# Patient Record
Sex: FEMALE | Race: WHITE | NOT HISPANIC OR LATINO | Employment: OTHER | URBAN - METROPOLITAN AREA
[De-identification: names, ages, dates, MRNs, and addresses within clinical notes are randomized per-mention and may not be internally consistent; named-entity substitution may affect disease eponyms.]

---

## 2017-03-01 DIAGNOSIS — M81.0 OSTEOPOROSIS: ICD-10-CM

## 2017-03-01 RX ORDER — ALENDRONATE SODIUM 70 MG/1
TABLET ORAL
Qty: 4 TABLET | Refills: 0 | OUTPATIENT
Start: 2017-03-01

## 2017-03-14 ENCOUNTER — CLINICAL SUPPORT NO REQUIREMENTS (OUTPATIENT)
Dept: CARDIOLOGY | Facility: CLINIC | Age: 82
End: 2017-03-14

## 2017-03-14 DIAGNOSIS — I48.20 CHRONIC ATRIAL FIBRILLATION (HCC): Primary | ICD-10-CM

## 2017-03-14 PROCEDURE — 93279 PRGRMG DEV EVAL PM/LDLS PM: CPT | Performed by: INTERNAL MEDICINE

## 2017-03-15 ENCOUNTER — OFFICE VISIT (OUTPATIENT)
Dept: FAMILY MEDICINE CLINIC | Facility: CLINIC | Age: 82
End: 2017-03-15

## 2017-03-15 VITALS
HEIGHT: 66 IN | WEIGHT: 151 LBS | HEART RATE: 72 BPM | RESPIRATION RATE: 16 BRPM | TEMPERATURE: 97.7 F | DIASTOLIC BLOOD PRESSURE: 88 MMHG | BODY MASS INDEX: 24.27 KG/M2 | SYSTOLIC BLOOD PRESSURE: 145 MMHG

## 2017-03-15 DIAGNOSIS — E78.49 OTHER HYPERLIPIDEMIA: ICD-10-CM

## 2017-03-15 DIAGNOSIS — M10.9 GOUT, UNSPECIFIED CAUSE, UNSPECIFIED CHRONICITY, UNSPECIFIED SITE: ICD-10-CM

## 2017-03-15 DIAGNOSIS — I10 ESSENTIAL HYPERTENSION: ICD-10-CM

## 2017-03-15 DIAGNOSIS — M81.0 OSTEOPOROSIS: ICD-10-CM

## 2017-03-15 DIAGNOSIS — E11.9 TYPE 2 DIABETES MELLITUS WITHOUT COMPLICATION, WITHOUT LONG-TERM CURRENT USE OF INSULIN (HCC): Primary | ICD-10-CM

## 2017-03-15 PROCEDURE — 99214 OFFICE O/P EST MOD 30 MIN: CPT | Performed by: FAMILY MEDICINE

## 2017-03-15 RX ORDER — VALSARTAN 160 MG/1
160 TABLET ORAL DAILY
Qty: 30 TABLET | Refills: 5 | Status: SHIPPED | OUTPATIENT
Start: 2017-03-15 | End: 2017-09-10 | Stop reason: SDUPTHER

## 2017-03-15 RX ORDER — FEBUXOSTAT 40 MG/1
40 TABLET, FILM COATED ORAL DAILY
Qty: 30 TABLET | Refills: 5 | Status: SHIPPED | OUTPATIENT
Start: 2017-03-15 | End: 2017-09-27

## 2017-03-15 RX ORDER — ALENDRONATE SODIUM 70 MG/1
70 TABLET ORAL
Qty: 4 TABLET | Refills: 5 | Status: SHIPPED | OUTPATIENT
Start: 2017-03-15 | End: 2017-09-27 | Stop reason: SDUPTHER

## 2017-03-15 NOTE — PATIENT INSTRUCTIONS
"Stop enalapril, start valsartan. Start januvia.       Diabetes   Diabetes and Nutrition      Why does it matter what I eat?    What you eat is closely connected to the amount of sugar in your blood. The right food choices will help you control your blood sugar level.    Do I have to follow a special diet?    There isn't one specific \"diabetes diet.\" Your doctor will probably suggest that you work with a registered dietitian to design a meal plan. A meal plan is a guide that tells you what kinds of food to eat at meals and for snacks. The plan also tells you how much food to have. For most people who have diabetes (and those without, too), a healthy diet consists of 40% to 60% of calories from carbohydrates, 20% from protein and 30% or less from fat. It should be low in cholesterol, low in salt and low in added sugar.    Can I eat any sugar?    Yes. In recent years, doctors have learned that eating some sugar doesn't usually cause problems for most people who have diabetes--as long as it is part of a balanced diet. Just be careful about how much sugar you eat and try not to add sugar to foods.    What kinds of foods can I eat?    In general, at each meal you may have 2 to 5 choices (or up to 60 grams) of carbohydrates, 1 choice of protein and a certain amount of fat. Talk to your doctor or dietitian for specific advice.    Carbohydrates. Carbohydrates are found in fruits, vegetables, beans, dairy foods and starchy foods such as breads. Try to have fresh fruits rather than canned fruits, fruit juices or dried fruit. You may eat fresh vegetables and frozen or canned vegetables. Condiments such as nonfat mayonnaise, ketchup and mustard are also carbohydrates.    Protein. Protein is found in meat, poultry, fish, dairy products, beans and some vegetables. Try to eat poultry and fish more often than red meat. Don't eat poultry skin, and trim extra fat from all meat. Choose nonfat or reduced-fat options when you eat dairy, " such as cheeses and yogurts.    Fat. Butter, margarine, lard and oils add fat to food. Fat is also in many dairy and meat products. Try to avoid fried foods, mayonnaise-based dishes (unless they are made with fat-free thomson), egg yolks, brown and high-fat dairy products. Your doctor or dietitian will tell you how many grams of fat you may eat each day. When eating fat-free versions of foods (such as mayonnaise and butter), check the label to see how many grams of carbohydrates they contain. Keep in mind that these products often have added sugar.       This handout was developed by the [employer] in cooperation with the American Diabetes Association.             Diabetes type 2 - meal planning      When you have type 2 diabetes, taking time to plan your meals goes a long way toward controlling your blood sugar and weight.    Your main focus is on keeping your blood sugar (glucose) level in your target range. To help manage your blood sugar, follow a meal plan that has:  •Food from all the food groups  •Fewer calories  •About the same amount of carbohydrates at each meal and snack  •Healthy fats    Along with healthy eating, you can keep your blood sugar in target range by maintaining a healthy weight. Persons with type 2 diabetes are often overweight. Losing just 10 pounds can help you manage your diabetes better. Eating healthy foods and staying active (for example, 30 minutes of walking per day) can help you meet and maintain your weight loss goal.     HOW CARBOHYDRATES AFFECT BLOOD SUGAR    Carbohydrates in food give your body energy. You need to eat carbohydrates to maintain your energy. But carbohydrates also raise your blood sugar higher and faster than other kinds of food.    The main kinds of carbohydrates are starches, sugars, and fiber. Learn which foods have carbohydrates. This will help with meal planning so that you can keep your blood sugar in your target range.    MEAL  PLANNING FOR CHILDREN WITH TYPE 2 DIABETES    Meal plans should consider the amount of calories children need to grow. In general, three small meals and three snacks a day can help meet calorie needs. Many children with type 2 diabetes are overweight. The goal should be a healthy weight by eating healthy foods and getting more activity (60 minutes each day).    Work with a registered dietitian to design a meal plan for your child. A registered dietitian is an expert in food and nutrition.    The following tips can help your child stay on track:  •No food is off-limits. Knowing how different foods affect your child’s blood sugar helps you and your child keep it in target range.  •Help your child learn how much food is a healthy amount. This is called portion control.  •Have your family gradually switch from drinking soda and other sugary drinks, such as sports drinks and juices, to plain water or low-fat milk.            PLANNING MEALS    Everyone has individual needs. Work with your doctor, registered dietitian, or diabetes educator to develop a meal plan that works for you.    When shopping, read food labels to make better food choices.    A good way to make sure you get all the nutrients you need during meals is to use the plate method. This is a visual food guide that helps you choose the best types and right amounts of food to eat. It encourages larger portions of non-starchy vegetables (half the plate) and moderate portions of protein (one quarter of the plate) and starch (one quarter of the plate). You can find more information about the plate method at the American Diabetes Association website: http://www.diabetes.org/food-and-fitness/food/planning-meals/create-your-plate.    EAT A VARIETY OF FOODS    Eating a wide variety of foods helps you stay healthy. Try to include foods from all the food groups at each meal.     VEGETABLES (2½ to 3 cups a day)    Choose fresh or frozen vegetables without added sauces,  fats, or salt. Non-starchy vegetables include dark green and deep yellow vegetables, such as spinach, broccoli, saba lettuce, cabbage, chard, and bell peppers. Starchy vegetables include corn, green peas, lima beans, potatoes, and taro.    FRUITS (1½ to 2 cups a day)    Choose fresh, frozen, canned (without added sugar), or dried fruits. Try apples, bananas, berries, cherries, fruit cocktail, grapes, melon, oranges, peaches, pears, papaya, pineapple, raisins. Drink juices that are 100% fruit with no added sweeteners or syrups.    GRAINS (3 to 4 ounces a day)    There are two types of grains:  •Whole grains are unprocessed and have the entire grain kernel. Examples are whole-wheat flour, oatmeal, whole cornmeal, amaranth, barley, brown and wild rice, buckwheat, and quinoa.  •Refined grains have been processed (milled) to remove the bran and germ. Examples are white flour, de-germed cornmeal, white bread, and white rice.    Grains have starch, a type of carbohydrate. Carbohydrates raise your blood sugar level. So, for healthy eating, make sure half of the grains you eat each day are whole grains. Whole grains have lots of fiber. Fiber in the diet keeps your blood sugar level from rising too fast.           PROTEIN FOODS (5 to 6½ ounces a day)    Protein foods include meat, poultry, seafood, eggs, beans and peas, nuts, seeds, and processed soy foods. Eat fish and poultry more often. Remove the skin from chicken and turkey. Select lean cuts of beef, veal, pork, or wild game. Trim all visible fat from meat. Bake, roast, broil, grill, or boil instead of frying.    DAIRY (3 cups a day)    Choose low-fat or nonfat dairy products. Be aware that milk, yogurt, and other dairy foods have natural sugar even when they do not contain added sugar. Take this into account when planning meals to stay in your blood sugar target range.        OILS/FATS (no more than 7 teaspoons a day)    Oils are not considered a food group. But they  have nutrients that help your body stay healthy. Oils are different from fats in that oils remain liquid at room temperature. Fats remain solid at room temperature.    Limit your intake of fatty foods, especially those high in saturated fat, such as hamburgers, deep-fried foods, brown, and butter.     Instead, choose foods that are high in polyunsaturated or monounsaturated fats. These include fish, nuts, and vegetable oils.    Oils can raise your blood sugar, but not as fast as starch. Oils are also high in calories. Try to use no more than the recommended daily limit of 7 teaspoons.    WHAT ABOUT ALCOHOL AND SWEETS?    If you choose to drink alcohol, limit the amount and have it with a meal. Check with your health care provider about how alcohol will affect your blood sugar and to determine a safe amount for you.    Sweets are high in fat and sugar. Keep portion sizes small.     Here are tips to help avoid eating too many sweets:  •Ask for extra spoons and forks and split your dessert with others.  •Eat sweets that are sugar-free.  •Always ask for the smallest serving size or children’s size.      A registered dietitian can help you decide how to balance the carbohydrates, protein, and fat in your diet. Here are some general guidelines:    The amount of each type of food you eat depends on:  •Your diet  •Your weight  •How often you exercise  •Your other health risks    Everyone has individual needs. Work with your doctor, and possibly a dietitian, to develop a meal plan that works for you.    The Diabetes Food Pyramid, which resembles the old USDA food guide pyramid, splits foods into six groups in a range of serving sizes. In the Diabetes Food Pyramid, food groups are based on carbohydrate and protein content instead of their food type. A person with diabetes should eat more of the foods in the bottom of the pyramid (grains, beans, vegetables) than those on the top (fats and sweets). This diet will help keep your  "heart and body systems healthy.    Another method, similar to the new \"plate\" USDA food guide, encourages larger portions of vegetables (half the plate) and moderate portions of protein (one-quarter of the plate) and starch (one-quarter of the plate).    GRAINS, BEANS, AND STARCHY VEGETABLES    (6 or more servings a day)    Foods like bread, grains, beans, rice, pasta, and starchy vegetables are at the bottom of the pyramid because they should serve as the foundation of your diet. As a group, these foods are loaded with vitamins, minerals, fiber, and healthy carbohydrates.    It is important, however, to eat foods with plenty of fiber. Choose whole-grain foods such as whole-grain bread or crackers, tortillas, bran cereal, brown rice, or beans. Use whole-wheat or other whole-grain flours in cooking and baking. Choose low-fat breads, such as bagels, tortillas, English muffins, and abdirizak bread.    VEGETABLES    (3 - 5 servings a day)    Choose fresh or frozen vegetables without added sauces, fats, or salt. Opt for more dark green and deep yellow vegetables, such as spinach, broccoli, saba lettuce, carrots, and peppers.    FRUITS    (2 - 4 servings a day)    Choose whole fruits more often than juices. Whole fruits have more fiber. Citrus fruits, such as oranges, grapefruits, and tangerines, are best. Drink fruit juices that do NOT have added sweeteners or syrups.    MILK    (2 - 3 servings a day)    Choose low-fat or nonfat milk or yogurt. Yogurt has natural sugar in it, but it can also contain added sugar or artificial sweeteners. Yogurt with artificial sweeteners has fewer calories than yogurt with added sugar.    MEAT AND FISH    (2 - 3 servings a day)    Eat fish and poultry more often. Remove the skin from chicken and turkey. Select lean cuts of beef, veal, pork, or wild game. Trim all visible fat from meat. Bake, roast, broil, grill, or boil instead of frying.    FATS, ALCOHOL, AND SWEETS    In general, you " should limit your intake of fatty foods, especially those high in saturated fat, such as hamburgers, cheese, brown, and butter.    If you choose to drink alcohol, limit the amount and have it with a meal. Check with your health care provider about how alcohol will affect your blood sugar, and to determine a safe amount for you.    Sweets are high in fat and sugar, so keep portion sizes small. Here are some tips to help avoid eating too many sweets:  •Ask for extra spoons and forks and split your dessert with others.  •Eat sweets that are sugar-free.  •Always ask for the small serving size.    Learn how to read food labels, and consult them when making food decisions.      References      American Diabetes Association. Standards of medical care in diabetes -- 2013.American Diabetes Association. Standards of medical care in diabetes -- 2013. Diabetes Care. 2013;36 Suppl 1:S11-S66

## 2017-03-15 NOTE — PROGRESS NOTES
"Chief Complaint   Patient presents with   • Diabetes       Subjective   This patient presents the office for medicine refill.  She is here also to review labs.  Her gout is still present but improved.  She still has significant hand swelling.  Please see photo below.  She continues to take medicines for osteoporosis.  Refills are needed.  Blood pressure is not to goal and we will change from her ACE inhibitor to valsartan.  Short-term follow-up in 6 weeks to recheck blood pressure. Diabetes has shown mild progression.  We will add Januvia to her current regimen.  She will pay attention to her diabetic diet.  Review of Systems   Constitutional: Negative for fatigue.   Cardiovascular: Negative for chest pain.   Musculoskeletal: Positive for arthralgias.       Objective   Visit Vitals   • /88   • Pulse 72   • Temp 97.7 °F (36.5 °C) (Oral)   • Resp 16   • Ht 66\" (167.6 cm)   • Wt 151 lb (68.5 kg)   • BMI 24.37 kg/m2     Body mass index is 24.37 kg/(m^2).  Physical Exam   Constitutional: She is cooperative. No distress.   Eyes: Conjunctivae and lids are normal.   Neck: Carotid bruit is not present. No tracheal deviation present.   Cardiovascular: Normal rate, regular rhythm and normal heart sounds.    No murmur heard.  Pulmonary/Chest: Effort normal and breath sounds normal.   Neurological: She is alert. She is not disoriented.   Skin: Skin is warm and dry.   Psychiatric: She has a normal mood and affect. Her speech is normal and behavior is normal.   Vitals reviewed.          Assessment/Plan     Problem List Items Addressed This Visit        Cardiovascular and Mediastinum    Hypertension    Relevant Medications    valsartan (DIOVAN) 160 MG tablet    Hyperlipidemia       Endocrine    Type 2 diabetes mellitus - Primary    Relevant Medications    valsartan (DIOVAN) 160 MG tablet    metFORMIN (GLUCOPHAGE) 500 MG tablet    SITagliptin (JANUVIA) 50 MG tablet       Musculoskeletal and Integument    Osteoporosis    " Relevant Medications    alendronate (FOSAMAX) 70 MG tablet       Other    Gout    Relevant Medications    febuxostat (ULORIC) 40 MG tablet          Outpatient Encounter Prescriptions as of 3/15/2017   Medication Sig Dispense Refill   • aspirin 81 MG tablet Take by mouth daily.     • atorvastatin (LIPITOR) 20 MG tablet Take 1 tablet by mouth Daily. 30 tablet 11   • digoxin (LANOXIN) 125 MCG tablet Take 1 tablet by mouth Daily. 30 tablet 11   • furosemide (LASIX) 40 MG tablet Take 1.5 tablets by mouth Daily. 45 tablet 11   • hydrALAZINE (APRESOLINE) 25 MG tablet Take 1 tablet by mouth 3 (Three) Times a Day. 90 tablet 11   • metoprolol tartrate (LOPRESSOR) 50 MG tablet Take 1 tablet by mouth 2 (Two) Times a Day. 60 tablet 11   • potassium chloride (KLOR-CON) 20 MEQ CR tablet Take 2 tablets by mouth Daily. 60 tablet 11   • [DISCONTINUED] colchicine (COLCRYS) 0.6 MG tablet Take 1.2 mg PO x 1, then 0.6 mg PO 1 hour later x 1 30 tablet 0   • [DISCONTINUED] enalapril (VASOTEC) 10 MG tablet Take 1 tablet by mouth Daily. 30 tablet 11   • alendronate (FOSAMAX) 70 MG tablet Take 1 tablet by mouth Every 7 (Seven) Days for 180 days. 4 tablet 5   • febuxostat (ULORIC) 40 MG tablet Take 1 tablet by mouth Daily for 180 days. 30 tablet 5   • metFORMIN (GLUCOPHAGE) 500 MG tablet Take 1 tablet by mouth 2 (Two) Times a Day With Meals for 180 days. 60 tablet 5   • SITagliptin (JANUVIA) 50 MG tablet Take 1 tablet by mouth Daily for 180 days. 30 tablet 5   • valsartan (DIOVAN) 160 MG tablet Take 1 tablet by mouth Daily for 180 days. 30 tablet 5   • [DISCONTINUED] alendronate (FOSAMAX) 70 MG tablet Take 1 tablet by mouth every 7 days for 180 days. 4 tablet 5   • [DISCONTINUED] febuxostat (ULORIC) 40 MG tablet Take 1 tablet by mouth Daily. 30 tablet 0   • [DISCONTINUED] metFORMIN (GLUCOPHAGE) 500 MG tablet Take 1 tablet by mouth 2 (two) times a day with meals for 180 days. 60 tablet 5     No facility-administered encounter medications on  file as of 3/15/2017.        No orders of the defined types were placed in this encounter.      Continue with current treatment plan.         F/U in 6 weeks to check on 2 medications

## 2017-04-26 ENCOUNTER — OFFICE VISIT (OUTPATIENT)
Dept: FAMILY MEDICINE CLINIC | Facility: CLINIC | Age: 82
End: 2017-04-26

## 2017-04-26 VITALS
DIASTOLIC BLOOD PRESSURE: 80 MMHG | HEART RATE: 68 BPM | RESPIRATION RATE: 16 BRPM | TEMPERATURE: 97.8 F | HEIGHT: 66 IN | WEIGHT: 153 LBS | BODY MASS INDEX: 24.59 KG/M2 | SYSTOLIC BLOOD PRESSURE: 138 MMHG

## 2017-04-26 DIAGNOSIS — E78.49 OTHER HYPERLIPIDEMIA: ICD-10-CM

## 2017-04-26 DIAGNOSIS — M10.9 GOUT, UNSPECIFIED CAUSE, UNSPECIFIED CHRONICITY, UNSPECIFIED SITE: ICD-10-CM

## 2017-04-26 DIAGNOSIS — E11.9 TYPE 2 DIABETES MELLITUS WITHOUT COMPLICATION, WITHOUT LONG-TERM CURRENT USE OF INSULIN (HCC): ICD-10-CM

## 2017-04-26 DIAGNOSIS — I10 ESSENTIAL HYPERTENSION: Primary | ICD-10-CM

## 2017-04-26 PROCEDURE — 99213 OFFICE O/P EST LOW 20 MIN: CPT | Performed by: FAMILY MEDICINE

## 2017-04-26 NOTE — PROGRESS NOTES
"Chief Complaint   Patient presents with   • Diabetes   • Hypertension       Subjective   Hypertension ROS: taking medications as instructed, no medication side effects noted, no dyspnea on exertion and no swelling of ankles.   New concerns: none.        Data Reviewed:  No visits with results within 30 Day(s) from this visit.  Latest known visit with results is:    Orders Only on 11/11/2016   Component Date Value   • Uric Acid 12/12/2016 5.6          Social History   Substance Use Topics   • Smoking status: Never Smoker   • Smokeless tobacco: Never Used   • Alcohol use No      Comment: occ       Review of Systems    Objective   /80  Pulse 68  Temp 97.8 °F (36.6 °C) (Oral)   Resp 16  Ht 66\" (167.6 cm)  Wt 153 lb (69.4 kg)  BMI 24.69 kg/m2  Body mass index is 24.69 kg/(m^2).  Physical Exam  Hypertension Exam: BP noted to be well controlled today in office, S1, S2 normal, no gallop, no murmur, chest clear, no JVD, no HSM, no edema.   Lab review: no lab studies available for review at time of visit.     Assessment/Plan   well controlled  Problem List Items Addressed This Visit        Cardiovascular and Mediastinum    Hypertension - Primary    Relevant Orders    Comprehensive metabolic panel    CBC and Differential    TSH    Hyperlipidemia    Relevant Orders    Lipid Panel With LDL/HDL Ratio       Endocrine    Type 2 diabetes mellitus    Relevant Orders    Comprehensive metabolic panel    CBC and Differential    Hemoglobin A1c    MicroAlbumin, Urine, Random       Other    Gout    Relevant Orders    Comprehensive metabolic panel    CBC and Differential    Uric Acid          Outpatient Encounter Prescriptions as of 4/26/2017   Medication Sig Dispense Refill   • alendronate (FOSAMAX) 70 MG tablet Take 1 tablet by mouth Every 7 (Seven) Days for 180 days. 4 tablet 5   • aspirin 81 MG tablet Take by mouth daily.     • atorvastatin (LIPITOR) 20 MG tablet Take 1 tablet by mouth Daily. 30 tablet 11   • digoxin " (LANOXIN) 125 MCG tablet Take 1 tablet by mouth Daily. 30 tablet 11   • febuxostat (ULORIC) 40 MG tablet Take 1 tablet by mouth Daily for 180 days. 30 tablet 5   • furosemide (LASIX) 40 MG tablet Take 1.5 tablets by mouth Daily. 45 tablet 11   • hydrALAZINE (APRESOLINE) 25 MG tablet Take 1 tablet by mouth 3 (Three) Times a Day. 90 tablet 11   • metFORMIN (GLUCOPHAGE) 500 MG tablet Take 1 tablet by mouth 2 (Two) Times a Day With Meals for 180 days. 60 tablet 5   • metoprolol tartrate (LOPRESSOR) 50 MG tablet Take 1 tablet by mouth 2 (Two) Times a Day. 60 tablet 11   • potassium chloride (KLOR-CON) 20 MEQ CR tablet Take 2 tablets by mouth Daily. 60 tablet 11   • SITagliptin (JANUVIA) 50 MG tablet Take 1 tablet by mouth Daily for 180 days. 30 tablet 5   • valsartan (DIOVAN) 160 MG tablet Take 1 tablet by mouth Daily for 180 days. 30 tablet 5     No facility-administered encounter medications on file as of 4/26/2017.        Orders Placed This Encounter   Procedures   • Comprehensive metabolic panel     Standing Status:   Future     Standing Expiration Date:   10/23/2017   • Lipid Panel With LDL/HDL Ratio     Standing Status:   Future     Standing Expiration Date:   10/23/2017   • TSH     Standing Status:   Future     Standing Expiration Date:   10/23/2017   • Hemoglobin A1c     Standing Status:   Future     Standing Expiration Date:   10/23/2017   • MicroAlbumin, Urine, Random     Standing Status:   Future     Standing Expiration Date:   10/23/2017   • Uric Acid     Standing Status:   Future     Standing Expiration Date:   10/23/2017       Continue with current treatment plan.  Current treatment plan is effective, no change in therapy.       F/U in 5 months

## 2017-05-02 ENCOUNTER — OFFICE VISIT (OUTPATIENT)
Dept: FAMILY MEDICINE CLINIC | Facility: CLINIC | Age: 82
End: 2017-05-02

## 2017-05-02 VITALS
OXYGEN SATURATION: 98 % | WEIGHT: 151 LBS | SYSTOLIC BLOOD PRESSURE: 136 MMHG | TEMPERATURE: 98.1 F | DIASTOLIC BLOOD PRESSURE: 90 MMHG | HEIGHT: 66 IN | RESPIRATION RATE: 16 BRPM | BODY MASS INDEX: 24.27 KG/M2 | HEART RATE: 70 BPM

## 2017-05-02 DIAGNOSIS — M10.9 GOUT, UNSPECIFIED CAUSE, UNSPECIFIED CHRONICITY, UNSPECIFIED SITE: Primary | ICD-10-CM

## 2017-05-02 PROCEDURE — 99213 OFFICE O/P EST LOW 20 MIN: CPT | Performed by: NURSE PRACTITIONER

## 2017-05-02 RX ORDER — METHYLPREDNISOLONE 4 MG/1
TABLET ORAL
Qty: 21 TABLET | Refills: 0 | Status: SHIPPED | OUTPATIENT
Start: 2017-05-02 | End: 2017-09-27

## 2017-06-25 ENCOUNTER — RESULTS ENCOUNTER (OUTPATIENT)
Dept: FAMILY MEDICINE CLINIC | Facility: CLINIC | Age: 82
End: 2017-06-25

## 2017-06-25 DIAGNOSIS — M10.9 GOUT, UNSPECIFIED CAUSE, UNSPECIFIED CHRONICITY, UNSPECIFIED SITE: ICD-10-CM

## 2017-06-25 DIAGNOSIS — E11.9 TYPE 2 DIABETES MELLITUS WITHOUT COMPLICATION, WITHOUT LONG-TERM CURRENT USE OF INSULIN (HCC): ICD-10-CM

## 2017-06-25 DIAGNOSIS — E78.49 OTHER HYPERLIPIDEMIA: ICD-10-CM

## 2017-06-25 DIAGNOSIS — I10 ESSENTIAL HYPERTENSION: ICD-10-CM

## 2017-09-10 DIAGNOSIS — E11.9 TYPE 2 DIABETES MELLITUS WITHOUT COMPLICATION, WITHOUT LONG-TERM CURRENT USE OF INSULIN (HCC): ICD-10-CM

## 2017-09-11 RX ORDER — VALSARTAN 160 MG/1
TABLET ORAL
Qty: 30 TABLET | Refills: 0 | Status: SHIPPED | OUTPATIENT
Start: 2017-09-11 | End: 2017-09-27 | Stop reason: SDUPTHER

## 2017-09-12 ENCOUNTER — CLINICAL SUPPORT NO REQUIREMENTS (OUTPATIENT)
Dept: CARDIOLOGY | Facility: CLINIC | Age: 82
End: 2017-09-12

## 2017-09-12 DIAGNOSIS — I48.20 CHRONIC ATRIAL FIBRILLATION (HCC): Primary | ICD-10-CM

## 2017-09-12 PROCEDURE — 93279 PRGRMG DEV EVAL PM/LDLS PM: CPT | Performed by: INTERNAL MEDICINE

## 2017-09-22 LAB
ALBUMIN SERPL-MCNC: 4.5 G/DL (ref 3.5–5.2)
ALBUMIN/GLOB SERPL: 1.9 G/DL
ALP SERPL-CCNC: 71 U/L (ref 39–117)
ALT SERPL-CCNC: 15 U/L (ref 1–33)
AST SERPL-CCNC: 18 U/L (ref 1–32)
BASOPHILS # BLD AUTO: 0.02 10*3/MM3 (ref 0–0.2)
BASOPHILS NFR BLD AUTO: 0.3 % (ref 0–1.5)
BILIRUB SERPL-MCNC: 1.2 MG/DL (ref 0.1–1.2)
BUN SERPL-MCNC: 17 MG/DL (ref 8–23)
BUN/CREAT SERPL: 18.7 (ref 7–25)
CALCIUM SERPL-MCNC: 9.1 MG/DL (ref 8.6–10.5)
CHLORIDE SERPL-SCNC: 101 MMOL/L (ref 98–107)
CHOLEST SERPL-MCNC: 132 MG/DL (ref 0–200)
CO2 SERPL-SCNC: 24.5 MMOL/L (ref 22–29)
CREAT SERPL-MCNC: 0.91 MG/DL (ref 0.57–1)
EOSINOPHIL # BLD AUTO: 0.2 10*3/MM3 (ref 0–0.7)
EOSINOPHIL NFR BLD AUTO: 3 % (ref 0.3–6.2)
ERYTHROCYTE [DISTWIDTH] IN BLOOD BY AUTOMATED COUNT: 16 % (ref 11.7–13)
GLOBULIN SER CALC-MCNC: 2.4 GM/DL
GLUCOSE SERPL-MCNC: 213 MG/DL (ref 65–99)
HBA1C MFR BLD: 8.23 % (ref 4.8–5.6)
HCT VFR BLD AUTO: 38.9 % (ref 35.6–45.5)
HDLC SERPL-MCNC: 46 MG/DL (ref 40–60)
HGB BLD-MCNC: 12.4 G/DL (ref 11.9–15.5)
IMM GRANULOCYTES # BLD: 0.02 10*3/MM3 (ref 0–0.03)
IMM GRANULOCYTES NFR BLD: 0.3 % (ref 0–0.5)
LDLC SERPL CALC-MCNC: 60 MG/DL (ref 0–100)
LDLC/HDLC SERPL: 1.3 {RATIO}
LYMPHOCYTES # BLD AUTO: 1.42 10*3/MM3 (ref 0.9–4.8)
LYMPHOCYTES NFR BLD AUTO: 21.1 % (ref 19.6–45.3)
MCH RBC QN AUTO: 32.5 PG (ref 26.9–32)
MCHC RBC AUTO-ENTMCNC: 31.9 G/DL (ref 32.4–36.3)
MCV RBC AUTO: 101.8 FL (ref 80.5–98.2)
MICROALBUMIN UR-MCNC: 19.4 UG/ML
MONOCYTES # BLD AUTO: 0.46 10*3/MM3 (ref 0.2–1.2)
MONOCYTES NFR BLD AUTO: 6.8 % (ref 5–12)
NEUTROPHILS # BLD AUTO: 4.61 10*3/MM3 (ref 1.9–8.1)
NEUTROPHILS NFR BLD AUTO: 68.5 % (ref 42.7–76)
PLATELET # BLD AUTO: 227 10*3/MM3 (ref 140–500)
POTASSIUM SERPL-SCNC: 4.4 MMOL/L (ref 3.5–5.2)
PROT SERPL-MCNC: 6.9 G/DL (ref 6–8.5)
RBC # BLD AUTO: 3.82 10*6/MM3 (ref 3.9–5.2)
SODIUM SERPL-SCNC: 141 MMOL/L (ref 136–145)
TRIGL SERPL-MCNC: 132 MG/DL (ref 0–150)
TSH SERPL DL<=0.005 MIU/L-ACNC: 4.08 MIU/ML (ref 0.27–4.2)
URATE SERPL-MCNC: 8.1 MG/DL (ref 2.4–5.7)
VLDLC SERPL CALC-MCNC: 26.4 MG/DL (ref 5–40)
WBC # BLD AUTO: 6.73 10*3/MM3 (ref 4.5–10.7)

## 2017-09-27 ENCOUNTER — OFFICE VISIT (OUTPATIENT)
Dept: FAMILY MEDICINE CLINIC | Facility: CLINIC | Age: 82
End: 2017-09-27

## 2017-09-27 VITALS
HEART RATE: 73 BPM | HEIGHT: 66 IN | WEIGHT: 149.5 LBS | DIASTOLIC BLOOD PRESSURE: 87 MMHG | SYSTOLIC BLOOD PRESSURE: 141 MMHG | BODY MASS INDEX: 24.03 KG/M2 | TEMPERATURE: 97.1 F

## 2017-09-27 DIAGNOSIS — Z23 IMMUNIZATION DUE: ICD-10-CM

## 2017-09-27 DIAGNOSIS — E11.65 UNCONTROLLED TYPE 2 DIABETES MELLITUS WITH HYPERGLYCEMIA, WITHOUT LONG-TERM CURRENT USE OF INSULIN (HCC): Primary | ICD-10-CM

## 2017-09-27 DIAGNOSIS — M10.9 GOUT, UNSPECIFIED CAUSE, UNSPECIFIED CHRONICITY, UNSPECIFIED SITE: ICD-10-CM

## 2017-09-27 DIAGNOSIS — M81.0 OSTEOPOROSIS: ICD-10-CM

## 2017-09-27 DIAGNOSIS — E78.49 OTHER HYPERLIPIDEMIA: ICD-10-CM

## 2017-09-27 DIAGNOSIS — E11.9 TYPE 2 DIABETES MELLITUS WITHOUT COMPLICATION, WITHOUT LONG-TERM CURRENT USE OF INSULIN (HCC): ICD-10-CM

## 2017-09-27 PROCEDURE — 90662 IIV NO PRSV INCREASED AG IM: CPT | Performed by: FAMILY MEDICINE

## 2017-09-27 PROCEDURE — G0008 ADMIN INFLUENZA VIRUS VAC: HCPCS | Performed by: FAMILY MEDICINE

## 2017-09-27 PROCEDURE — 99214 OFFICE O/P EST MOD 30 MIN: CPT | Performed by: FAMILY MEDICINE

## 2017-09-27 RX ORDER — VALSARTAN 320 MG/1
320 TABLET ORAL DAILY
Qty: 30 TABLET | Refills: 5 | Status: SHIPPED | OUTPATIENT
Start: 2017-09-27 | End: 2017-11-27 | Stop reason: SDUPTHER

## 2017-09-27 RX ORDER — ATORVASTATIN CALCIUM 20 MG/1
20 TABLET, FILM COATED ORAL DAILY
Qty: 30 TABLET | Refills: 5 | Status: SHIPPED | OUTPATIENT
Start: 2017-09-27 | End: 2017-11-27 | Stop reason: SDUPTHER

## 2017-09-27 RX ORDER — FEBUXOSTAT 40 MG/1
40 TABLET, FILM COATED ORAL DAILY
Qty: 30 TABLET | Refills: 5 | Status: SHIPPED | OUTPATIENT
Start: 2017-09-27 | End: 2017-11-27 | Stop reason: ALTCHOICE

## 2017-09-27 RX ORDER — PIOGLITAZONEHYDROCHLORIDE 15 MG/1
15 TABLET ORAL DAILY
Qty: 30 TABLET | Refills: 5 | Status: SHIPPED | OUTPATIENT
Start: 2017-09-27 | End: 2017-11-27 | Stop reason: SDUPTHER

## 2017-09-27 RX ORDER — ALENDRONATE SODIUM 70 MG/1
70 TABLET ORAL
Qty: 4 TABLET | Refills: 5 | Status: SHIPPED | OUTPATIENT
Start: 2017-09-27 | End: 2017-11-27 | Stop reason: SDUPTHER

## 2017-09-27 NOTE — PROGRESS NOTES
"Chief Complaint   Patient presents with   • Hypertension   • Diabetes   • Hyperlipidemia       Subjective     Val presents to the office today to refill her medications and review recent labs. No medication side effects are reported.  This patient presents the office to review labs.  Her diabetes is not controlled.  We will add low-dose pioglitazone short-term follow-up.  The pressure is not to goal and we will increase valsartan to 320 mg.  Lipids are to goal.  Her gout is improved.  Her uric acid level is above goal.  We will reinstitute Uloric daily.  She is continuing to take her alendronate for her osteoporosis.  Immunizations are due.    I have reviewed and updated her medications, medical history and problem list during today's office visit.        Social History   Substance Use Topics   • Smoking status: Never Smoker   • Smokeless tobacco: Never Used   • Alcohol use No      Comment: occ       Review of Systems   Constitutional: Negative for fatigue.   Cardiovascular: Negative for chest pain.       Objective   /87  Pulse 73  Temp 97.1 °F (36.2 °C) (Oral)   Ht 66\" (167.6 cm)  Wt 149 lb 8 oz (67.8 kg)  BMI 24.13 kg/m2  Body mass index is 24.13 kg/(m^2).  Physical Exam   Constitutional: She is cooperative. No distress.   Eyes: Conjunctivae and lids are normal.   Neck: Carotid bruit is not present. No tracheal deviation present.   Cardiovascular: Normal rate, regular rhythm and normal heart sounds.    No murmur heard.  Pulmonary/Chest: Effort normal and breath sounds normal.   Neurological: She is alert. She is not disoriented.   Skin: Skin is warm and dry.   Psychiatric: She has a normal mood and affect. Her speech is normal and behavior is normal.   Vitals reviewed.      Data Reviewed:    CMP:  Lab Results   Component Value Date     (H) 09/21/2017    BUN 17 09/21/2017    CREATININE 0.91 09/21/2017    EGFRIFNONA 58 (L) 09/21/2017    EGFRIFAFRI 71 09/21/2017     09/21/2017    K 4.4 " 09/21/2017     09/21/2017    CALCIUM 9.1 09/21/2017    PROTENTOTREF 6.9 09/21/2017    ALBUMIN 4.50 09/21/2017    LABGLOBREF 2.4 09/21/2017    BILITOT 1.2 09/21/2017    ALKPHOS 71 09/21/2017    AST 18 09/21/2017    ALT 15 09/21/2017     CBC w/ diff:   Lab Results   Component Value Date    WBC 6.73 09/21/2017    RBC 3.82 (L) 09/21/2017    HGB 12.4 09/21/2017    HCT 38.9 09/21/2017    .8 (H) 09/21/2017    MCH 32.5 (H) 09/21/2017    MCHC 31.9 (L) 09/21/2017    RDW 16.0 (H) 09/21/2017     09/21/2017    NEUTRORELPCT 68.5 09/21/2017    LYMPHORELPCT 21.1 09/21/2017    MONORELPCT 6.8 09/21/2017    EOSRELPCT 3.0 09/21/2017    BASORELPCT 0.3 09/21/2017     LIPID PANEL:  Lab Results   Component Value Date    CHLPL 132 09/21/2017    TRIG 132 09/21/2017    HDL 46 09/21/2017    VLDL 26.4 09/21/2017    LDL 60 09/21/2017    LDLHDL 1.30 09/21/2017     TSH:  Lab Results   Component Value Date    TSH 4.080 09/21/2017     HGBA1C (LAST 3):  Lab Results   Component Value Date    HGBA1C 8.23 (H) 09/21/2017    HGBA1C 7.50 (H) 03/06/2017    HGBA1C 6.40 (H) 09/12/2016     MICROALBUMIN SPOT URINE:  Lab Results   Component Value Date    MICROALBUR 19.4 09/21/2017       Assessment/Plan     Problem List Items Addressed This Visit        Cardiovascular and Mediastinum    Hyperlipidemia    Relevant Medications    atorvastatin (LIPITOR) 20 MG tablet    Other Relevant Orders    Lipid Panel With LDL/HDL Ratio       Musculoskeletal and Integument    Osteoporosis    Relevant Medications    alendronate (FOSAMAX) 70 MG tablet       Other    Uncontrolled type 2 diabetes mellitus with hyperglycemia - Primary    Relevant Medications    metFORMIN (GLUCOPHAGE) 500 MG tablet    valsartan (DIOVAN) 320 MG tablet    pioglitazone (ACTOS) 15 MG tablet    Other Relevant Orders    Comprehensive metabolic panel    CBC and Differential    Hemoglobin A1c    MicroAlbumin, Urine, Random    Gout    Relevant Medications    febuxostat (ULORIC) 40 MG tablet     Other Relevant Orders    Uric Acid      Other Visit Diagnoses     Type 2 diabetes mellitus without complication, without long-term current use of insulin        Relevant Medications    metFORMIN (GLUCOPHAGE) 500 MG tablet    valsartan (DIOVAN) 320 MG tablet    pioglitazone (ACTOS) 15 MG tablet    Immunization due        Relevant Orders    Flu Vaccine High Dose PF 65YR+          Outpatient Encounter Prescriptions as of 9/27/2017   Medication Sig Dispense Refill   • aspirin 81 MG tablet Take by mouth daily.     • atorvastatin (LIPITOR) 20 MG tablet Take 1 tablet by mouth Daily for 180 days. 30 tablet 5   • digoxin (LANOXIN) 125 MCG tablet Take 1 tablet by mouth Daily. 30 tablet 11   • furosemide (LASIX) 40 MG tablet Take 1.5 tablets by mouth Daily. 45 tablet 11   • hydrALAZINE (APRESOLINE) 25 MG tablet Take 1 tablet by mouth 3 (Three) Times a Day. 90 tablet 11   • metFORMIN (GLUCOPHAGE) 500 MG tablet Take 1 tablet by mouth 2 (Two) Times a Day With Meals for 180 days. 60 tablet 5   • metoprolol tartrate (LOPRESSOR) 50 MG tablet Take 1 tablet by mouth 2 (Two) Times a Day. 60 tablet 11   • potassium chloride (KLOR-CON) 20 MEQ CR tablet Take 2 tablets by mouth Daily. 60 tablet 11   • valsartan (DIOVAN) 320 MG tablet Take 1 tablet by mouth Daily for 180 days. 30 tablet 5   • [DISCONTINUED] atorvastatin (LIPITOR) 20 MG tablet Take 1 tablet by mouth Daily. 30 tablet 11   • [DISCONTINUED] metFORMIN (GLUCOPHAGE) 500 MG tablet Take 1 tablet by mouth 2 (Two) Times a Day With Meals for 180 days. 60 tablet 5   • [DISCONTINUED] valsartan (DIOVAN) 160 MG tablet TAKE ONE TABLET BY MOUTH ONCE DAILY 30 tablet 0   • alendronate (FOSAMAX) 70 MG tablet Take 1 tablet by mouth Every 7 (Seven) Days for 180 days. 4 tablet 5   • febuxostat (ULORIC) 40 MG tablet Take 1 tablet by mouth Daily for 180 days. 30 tablet 5   • pioglitazone (ACTOS) 15 MG tablet Take 1 tablet by mouth Daily for 180 days. 30 tablet 5   • [DISCONTINUED] alendronate  (FOSAMAX) 70 MG tablet Take 1 tablet by mouth Every 7 (Seven) Days for 180 days. 4 tablet 5   • [DISCONTINUED] febuxostat (ULORIC) 40 MG tablet Take 1 tablet by mouth Daily for 180 days. 30 tablet 5   • [DISCONTINUED] MethylPREDNISolone (MEDROL, PEPPER,) 4 MG tablet Take as directed on package instructions. 21 tablet 0   • [DISCONTINUED] SITagliptin (JANUVIA) 50 MG tablet Take 1 tablet by mouth Daily for 180 days. 30 tablet 5     No facility-administered encounter medications on file as of 9/27/2017.        Orders Placed This Encounter   Procedures   • Flu Vaccine High Dose PF 65YR+   • Comprehensive metabolic panel     Standing Status:   Future     Standing Expiration Date:   3/26/2018   • Lipid Panel With LDL/HDL Ratio     Standing Status:   Future     Standing Expiration Date:   3/26/2018   • Hemoglobin A1c     Standing Status:   Future     Standing Expiration Date:   3/26/2018   • MicroAlbumin, Urine, Random     Standing Status:   Future     Standing Expiration Date:   3/26/2018   • Uric Acid     Standing Status:   Future     Standing Expiration Date:   3/26/2018   • CBC and Differential     Standing Status:   Future     Standing Expiration Date:   3/26/2018     Order Specific Question:   Manual Differential     Answer:   No       Continue with current treatment plan.         F/U in 2 months

## 2017-10-10 DIAGNOSIS — E11.9 TYPE 2 DIABETES MELLITUS WITHOUT COMPLICATION, WITHOUT LONG-TERM CURRENT USE OF INSULIN (HCC): ICD-10-CM

## 2017-10-10 RX ORDER — VALSARTAN 160 MG/1
TABLET ORAL
Qty: 30 TABLET | Refills: 0 | OUTPATIENT
Start: 2017-10-10

## 2017-10-11 ENCOUNTER — RESULTS ENCOUNTER (OUTPATIENT)
Dept: FAMILY MEDICINE CLINIC | Facility: CLINIC | Age: 82
End: 2017-10-11

## 2017-10-11 DIAGNOSIS — M10.9 GOUT, UNSPECIFIED CAUSE, UNSPECIFIED CHRONICITY, UNSPECIFIED SITE: ICD-10-CM

## 2017-10-11 DIAGNOSIS — E11.65 UNCONTROLLED TYPE 2 DIABETES MELLITUS WITH HYPERGLYCEMIA, WITHOUT LONG-TERM CURRENT USE OF INSULIN (HCC): ICD-10-CM

## 2017-10-11 DIAGNOSIS — E78.49 OTHER HYPERLIPIDEMIA: ICD-10-CM

## 2017-10-11 RX ORDER — HYDRALAZINE HYDROCHLORIDE 25 MG/1
TABLET, FILM COATED ORAL
Qty: 90 TABLET | Refills: 11 | Status: SHIPPED | OUTPATIENT
Start: 2017-10-11 | End: 2018-11-20 | Stop reason: SDUPTHER

## 2017-10-11 RX ORDER — METOPROLOL TARTRATE 50 MG/1
TABLET, FILM COATED ORAL
Qty: 60 TABLET | Refills: 11 | Status: SHIPPED | OUTPATIENT
Start: 2017-10-11 | End: 2018-09-06 | Stop reason: SDUPTHER

## 2017-10-11 RX ORDER — DIGOXIN 125 MCG
TABLET ORAL
Qty: 30 TABLET | Refills: 11 | Status: SHIPPED | OUTPATIENT
Start: 2017-10-11 | End: 2018-10-18 | Stop reason: SDUPTHER

## 2017-10-23 RX ORDER — POTASSIUM CHLORIDE 1500 MG/1
TABLET, EXTENDED RELEASE ORAL
Qty: 60 TABLET | Refills: 0 | Status: SHIPPED | OUTPATIENT
Start: 2017-10-23 | End: 2018-06-06

## 2017-10-23 RX ORDER — FUROSEMIDE 40 MG/1
TABLET ORAL
Qty: 45 TABLET | Refills: 0 | Status: SHIPPED | OUTPATIENT
Start: 2017-10-23 | End: 2018-06-06

## 2017-10-25 RX ORDER — POTASSIUM CHLORIDE 1500 MG/1
TABLET, EXTENDED RELEASE ORAL
Qty: 60 TABLET | Refills: 0 | Status: SHIPPED | OUTPATIENT
Start: 2017-10-25 | End: 2017-11-30 | Stop reason: SDUPTHER

## 2017-10-25 RX ORDER — FUROSEMIDE 40 MG/1
TABLET ORAL
Qty: 45 TABLET | Refills: 0 | Status: SHIPPED | OUTPATIENT
Start: 2017-10-25 | End: 2017-11-22 | Stop reason: SDUPTHER

## 2017-11-02 ENCOUNTER — OFFICE VISIT (OUTPATIENT)
Dept: CARDIOLOGY | Facility: CLINIC | Age: 82
End: 2017-11-02

## 2017-11-02 VITALS
SYSTOLIC BLOOD PRESSURE: 124 MMHG | DIASTOLIC BLOOD PRESSURE: 74 MMHG | BODY MASS INDEX: 24.27 KG/M2 | HEIGHT: 66 IN | WEIGHT: 151 LBS | HEART RATE: 72 BPM

## 2017-11-02 DIAGNOSIS — I48.20 CHRONIC ATRIAL FIBRILLATION (HCC): Primary | ICD-10-CM

## 2017-11-02 PROCEDURE — 99213 OFFICE O/P EST LOW 20 MIN: CPT | Performed by: INTERNAL MEDICINE

## 2017-11-02 PROCEDURE — 93000 ELECTROCARDIOGRAM COMPLETE: CPT | Performed by: INTERNAL MEDICINE

## 2017-11-02 NOTE — PROGRESS NOTES
Subjective:     Encounter Date:11/02/2017      Patient ID: Val Jeronimo is a 88 y.o. female.    Chief Complaint:  Shortness of Breath   This is a chronic problem. The current episode started more than 1 year ago. The problem has been resolved. Pertinent negatives include no syncope.   Atrial Fibrillation   Presents for follow-up visit. Symptoms include shortness of breath. Symptoms are negative for palpitations, syncope and tachycardia. The symptoms have been stable. Past medical history includes atrial fibrillation.   Fatigue   This is a chronic problem. The problem has been unchanged. Associated symptoms include fatigue.       88-year-old female who presents today for reevaluation.  Patient has history of atrial fibrillation as well as sick sinus syndrome.  Her EKG shows a pacemaker she's followed through our pacemaker department with no recent issues.    Review of Systems   Constitution: Positive for fatigue.   Cardiovascular: Negative for palpitations and syncope.   Respiratory: Positive for shortness of breath.    All other systems reviewed and are negative.        ECG 12 Lead  Date/Time: 11/2/2017 1:57 PM  Performed by: JOSEFA RAIN  Authorized by: JOSEFA RAIN   Comparison: compared with previous ECG from 10/6/2016  Similar to previous ECG  Rhythm: atrial fibrillation  Pacing: aberrant pacer spikes  Clinical impression: abnormal ECG               Objective:     Physical Exam   Constitutional: She is oriented to person, place, and time. She appears well-developed.   HENT:   Head: Normocephalic.   Eyes: Conjunctivae are normal.   Neck: Normal range of motion.   Cardiovascular: Normal rate, regular rhythm and normal heart sounds.    Pulmonary/Chest: Breath sounds normal.   Abdominal: Soft. Bowel sounds are normal.   Musculoskeletal: Normal range of motion. She exhibits no edema.   Neurological: She is alert and oriented to person, place, and time.   Skin: Skin is warm and dry.   Psychiatric:  She has a normal mood and affect. Her behavior is normal.   Vitals reviewed.      Lab Review:       Assessment:          Diagnosis Plan   1. Chronic atrial fibrillation  ECG 12 Lead          Plan:          1. History of chronic atrial fibrillation. Heart rate looks good.  2. History of sick sinus syndrome status post pacemaker.  3. Fatigue chronic.  She has not had any further falls she did have an odontoid fracture in the past with a fall.  At this point I would continue same follow-up in one year.    Atrial Fibrillation and Atrial Flutter  Assessment  • The patient has permanent atrial fibrillation  • This is non-valvular in etiology  • The patient's CHADS2-VASc score is 4  • A [id number] score of 2 or more is considered a high risk for a thromboembolic event  • Warfarin not prescribed for medical reasons    Plan  • Continue in atrial fibrillation with rate control  • Continue aspirin for antithrombotic therapy, bleeding issues discussed  • Continue beta blocker for rate control

## 2017-11-03 ENCOUNTER — TELEPHONE (OUTPATIENT)
Dept: FAMILY MEDICINE CLINIC | Facility: CLINIC | Age: 82
End: 2017-11-03

## 2017-11-03 NOTE — TELEPHONE ENCOUNTER
Pt daughter called stating the Uloric co-pay has gone up to over $100 a month, the insurance told her that Allopurinol would only cost $4 a month.

## 2017-11-13 NOTE — TELEPHONE ENCOUNTER
Tell her we will discuss at our next appointment. Do we have any samples to give her? Also remind her to get labs before our visit. Thanks, Dr. Arthur

## 2017-11-21 LAB
ALBUMIN SERPL-MCNC: 4.5 G/DL (ref 3.5–5.2)
ALBUMIN/GLOB SERPL: 1.5 G/DL
ALP SERPL-CCNC: 61 U/L (ref 39–117)
ALT SERPL-CCNC: 12 U/L (ref 1–33)
AST SERPL-CCNC: 19 U/L (ref 1–32)
BASOPHILS # BLD AUTO: 0.03 10*3/MM3 (ref 0–0.2)
BASOPHILS NFR BLD AUTO: 0.5 % (ref 0–1.5)
BILIRUB SERPL-MCNC: 1.2 MG/DL (ref 0.1–1.2)
BUN SERPL-MCNC: 20 MG/DL (ref 8–23)
BUN/CREAT SERPL: 21.1 (ref 7–25)
CALCIUM SERPL-MCNC: 9.6 MG/DL (ref 8.6–10.5)
CHLORIDE SERPL-SCNC: 99 MMOL/L (ref 98–107)
CHOLEST SERPL-MCNC: 146 MG/DL (ref 0–200)
CO2 SERPL-SCNC: 25.1 MMOL/L (ref 22–29)
CREAT SERPL-MCNC: 0.95 MG/DL (ref 0.57–1)
EOSINOPHIL # BLD AUTO: 0.25 10*3/MM3 (ref 0–0.7)
EOSINOPHIL NFR BLD AUTO: 3.8 % (ref 0.3–6.2)
ERYTHROCYTE [DISTWIDTH] IN BLOOD BY AUTOMATED COUNT: 15.1 % (ref 11.7–13)
GFR SERPLBLD CREATININE-BSD FMLA CKD-EPI: 56 ML/MIN/1.73
GFR SERPLBLD CREATININE-BSD FMLA CKD-EPI: 67 ML/MIN/1.73
GLOBULIN SER CALC-MCNC: 3 GM/DL
GLUCOSE SERPL-MCNC: 167 MG/DL (ref 65–99)
HBA1C MFR BLD: 7.4 % (ref 4.8–5.6)
HCT VFR BLD AUTO: 38.8 % (ref 35.6–45.5)
HDLC SERPL-MCNC: 47 MG/DL (ref 40–60)
HGB BLD-MCNC: 12.3 G/DL (ref 11.9–15.5)
IMM GRANULOCYTES # BLD: 0 10*3/MM3 (ref 0–0.03)
IMM GRANULOCYTES NFR BLD: 0 % (ref 0–0.5)
LDLC SERPL CALC-MCNC: 70 MG/DL (ref 0–100)
LDLC/HDLC SERPL: 1.5 {RATIO}
LYMPHOCYTES # BLD AUTO: 1.25 10*3/MM3 (ref 0.9–4.8)
LYMPHOCYTES NFR BLD AUTO: 19.1 % (ref 19.6–45.3)
MCH RBC QN AUTO: 33.1 PG (ref 26.9–32)
MCHC RBC AUTO-ENTMCNC: 31.7 G/DL (ref 32.4–36.3)
MCV RBC AUTO: 104.3 FL (ref 80.5–98.2)
MICROALBUMIN UR-MCNC: 9.5 UG/ML
MONOCYTES # BLD AUTO: 0.55 10*3/MM3 (ref 0.2–1.2)
MONOCYTES NFR BLD AUTO: 8.4 % (ref 5–12)
NEUTROPHILS # BLD AUTO: 4.48 10*3/MM3 (ref 1.9–8.1)
NEUTROPHILS NFR BLD AUTO: 68.2 % (ref 42.7–76)
PLATELET # BLD AUTO: 226 10*3/MM3 (ref 140–500)
POTASSIUM SERPL-SCNC: 4.3 MMOL/L (ref 3.5–5.2)
PROT SERPL-MCNC: 7.5 G/DL (ref 6–8.5)
RBC # BLD AUTO: 3.72 10*6/MM3 (ref 3.9–5.2)
SODIUM SERPL-SCNC: 139 MMOL/L (ref 136–145)
TRIGL SERPL-MCNC: 143 MG/DL (ref 0–150)
URATE SERPL-MCNC: 6.8 MG/DL (ref 2.4–5.7)
VLDLC SERPL CALC-MCNC: 28.6 MG/DL (ref 5–40)
WBC # BLD AUTO: 6.56 10*3/MM3 (ref 4.5–10.7)

## 2017-11-22 RX ORDER — FUROSEMIDE 40 MG/1
TABLET ORAL
Qty: 135 TABLET | Refills: 3 | Status: SHIPPED | OUTPATIENT
Start: 2017-11-22 | End: 2018-11-21 | Stop reason: SDUPTHER

## 2017-11-27 ENCOUNTER — OFFICE VISIT (OUTPATIENT)
Dept: FAMILY MEDICINE CLINIC | Facility: CLINIC | Age: 82
End: 2017-11-27

## 2017-11-27 VITALS
RESPIRATION RATE: 16 BRPM | TEMPERATURE: 97.8 F | BODY MASS INDEX: 23.95 KG/M2 | HEIGHT: 66 IN | DIASTOLIC BLOOD PRESSURE: 74 MMHG | WEIGHT: 149 LBS | SYSTOLIC BLOOD PRESSURE: 137 MMHG | HEART RATE: 71 BPM

## 2017-11-27 DIAGNOSIS — M81.0 AGE-RELATED OSTEOPOROSIS WITHOUT CURRENT PATHOLOGICAL FRACTURE: ICD-10-CM

## 2017-11-27 DIAGNOSIS — E11.9 TYPE 2 DIABETES MELLITUS WITHOUT COMPLICATION, WITHOUT LONG-TERM CURRENT USE OF INSULIN (HCC): ICD-10-CM

## 2017-11-27 DIAGNOSIS — E78.49 OTHER HYPERLIPIDEMIA: ICD-10-CM

## 2017-11-27 DIAGNOSIS — E11.65 UNCONTROLLED TYPE 2 DIABETES MELLITUS WITH HYPERGLYCEMIA, WITHOUT LONG-TERM CURRENT USE OF INSULIN (HCC): Primary | ICD-10-CM

## 2017-11-27 DIAGNOSIS — M1A.09X0 IDIOPATHIC CHRONIC GOUT OF MULTIPLE SITES WITHOUT TOPHUS: ICD-10-CM

## 2017-11-27 PROCEDURE — 99214 OFFICE O/P EST MOD 30 MIN: CPT | Performed by: FAMILY MEDICINE

## 2017-11-27 RX ORDER — ALLOPURINOL 100 MG/1
100 TABLET ORAL DAILY
Qty: 30 TABLET | Refills: 5 | Status: SHIPPED | OUTPATIENT
Start: 2017-11-27 | End: 2018-05-09 | Stop reason: SDUPTHER

## 2017-11-27 RX ORDER — PIOGLITAZONEHYDROCHLORIDE 30 MG/1
30 TABLET ORAL DAILY
Qty: 30 TABLET | Refills: 5 | Status: SHIPPED | OUTPATIENT
Start: 2017-11-27 | End: 2018-05-09 | Stop reason: SDUPTHER

## 2017-11-27 RX ORDER — ALENDRONATE SODIUM 70 MG/1
70 TABLET ORAL
Qty: 4 TABLET | Refills: 5 | Status: SHIPPED | OUTPATIENT
Start: 2017-11-27 | End: 2018-05-09 | Stop reason: SDUPTHER

## 2017-11-27 RX ORDER — VALSARTAN 320 MG/1
320 TABLET ORAL DAILY
Qty: 30 TABLET | Refills: 5 | Status: SHIPPED | OUTPATIENT
Start: 2017-11-27 | End: 2018-05-09 | Stop reason: SDUPTHER

## 2017-11-27 RX ORDER — ATORVASTATIN CALCIUM 20 MG/1
20 TABLET, FILM COATED ORAL DAILY
Qty: 30 TABLET | Refills: 5 | Status: SHIPPED | OUTPATIENT
Start: 2017-11-27 | End: 2018-05-09 | Stop reason: SDUPTHER

## 2017-11-27 NOTE — PROGRESS NOTES
"Chief Complaint   Patient presents with   • Hypertension   • Diabetes       Subjective     Val presents to the office today to refill her medications. No medication side effects are reported.  Labs have been reviewed and are seen below.  We will change Uloric to allopurinol due to cost considerations.  Her diabetes is nearly to goal.  We will increase pioglitazone to 30 mg daily.  Refills of her other medications have been created.  Blood pressure is to goal.  Lipids are stable with current therapy.    I have reviewed and updated her medications, medical history and problem list during today's office visit.        Social History   Substance Use Topics   • Smoking status: Never Smoker   • Smokeless tobacco: Never Used   • Alcohol use No      Comment: occ/caffeine use       Review of Systems   Constitutional: Negative for fatigue.   Cardiovascular: Negative for chest pain.       Objective   /74  Pulse 71  Temp 97.8 °F (36.6 °C) (Oral)   Resp 16  Ht 66\" (167.6 cm)  Wt 149 lb (67.6 kg)  BMI 24.05 kg/m2  Body mass index is 24.05 kg/(m^2).  Physical Exam   Constitutional: She is cooperative. No distress.   Eyes: Conjunctivae and lids are normal.   Neck: Carotid bruit is not present. No tracheal deviation present.   Cardiovascular: Normal rate, regular rhythm and normal heart sounds.    No murmur heard.  Pulmonary/Chest: Effort normal and breath sounds normal.   Neurological: She is alert. She is not disoriented.   Skin: Skin is warm and dry.   Psychiatric: She has a normal mood and affect. Her speech is normal and behavior is normal.   Vitals reviewed.      Data Reviewed:    CMP:  Lab Results   Component Value Date     (H) 11/20/2017    BUN 20 11/20/2017    CREATININE 0.95 11/20/2017    EGFRIFNONA 56 (L) 11/20/2017    EGFRIFAFRI 67 11/20/2017     11/20/2017    K 4.3 11/20/2017    CL 99 11/20/2017    CALCIUM 9.6 11/20/2017    PROTENTOTREF 7.5 11/20/2017    ALBUMIN 4.50 11/20/2017    LABGLOBREF " 3.0 11/20/2017    BILITOT 1.2 11/20/2017    ALKPHOS 61 11/20/2017    AST 19 11/20/2017    ALT 12 11/20/2017     CBC w/ diff:   Lab Results   Component Value Date    WBC 6.56 11/20/2017    RBC 3.72 (L) 11/20/2017    HGB 12.3 11/20/2017    HCT 38.8 11/20/2017    .3 (H) 11/20/2017    MCH 33.1 (H) 11/20/2017    MCHC 31.7 (L) 11/20/2017    RDW 15.1 (H) 11/20/2017     11/20/2017    NEUTRORELPCT 68.2 11/20/2017    LYMPHORELPCT 19.1 (L) 11/20/2017    MONORELPCT 8.4 11/20/2017    EOSRELPCT 3.8 11/20/2017    BASORELPCT 0.5 11/20/2017     LIPID PANEL:  Lab Results   Component Value Date    CHLPL 146 11/20/2017    TRIG 143 11/20/2017    HDL 47 11/20/2017    VLDL 28.6 11/20/2017    LDL 70 11/20/2017    LDLHDL 1.50 11/20/2017     TSH:  Lab Results   Component Value Date    TSH 4.080 09/21/2017       Assessment/Plan     Problem List Items Addressed This Visit        Cardiovascular and Mediastinum    Hyperlipidemia    Relevant Medications    atorvastatin (LIPITOR) 20 MG tablet    Other Relevant Orders    Lipid Panel With LDL/HDL Ratio       Endocrine    Uncontrolled type 2 diabetes mellitus with hyperglycemia - Primary    Relevant Medications    pioglitazone (ACTOS) 30 MG tablet    metFORMIN (GLUCOPHAGE) 500 MG tablet    valsartan (DIOVAN) 320 MG tablet    Other Relevant Orders    Comprehensive metabolic panel    CBC and Differential    Hemoglobin A1c    MicroAlbumin, Urine, Random - Urine, Clean Catch       Musculoskeletal and Integument    Osteoporosis    Relevant Medications    alendronate (FOSAMAX) 70 MG tablet    Chronic gout of multiple sites    Relevant Medications    allopurinol (ZYLOPRIM) 100 MG tablet    Other Relevant Orders    Uric Acid      Other Visit Diagnoses     Type 2 diabetes mellitus without complication, without long-term current use of insulin        Relevant Medications    pioglitazone (ACTOS) 30 MG tablet    metFORMIN (GLUCOPHAGE) 500 MG tablet    valsartan (DIOVAN) 320 MG tablet           Outpatient Encounter Prescriptions as of 11/27/2017   Medication Sig Dispense Refill   • alendronate (FOSAMAX) 70 MG tablet Take 1 tablet by mouth Every 7 (Seven) Days for 180 days. 4 tablet 5   • aspirin 81 MG tablet Take by mouth daily.     • atorvastatin (LIPITOR) 20 MG tablet Take 1 tablet by mouth Daily for 180 days. 30 tablet 5   • digoxin (LANOXIN) 125 MCG tablet TAKE ONE TABLET BY MOUTH ONCE DAILY 30 tablet 11   • furosemide (LASIX) 40 MG tablet TAKE ONE & ONE-HALF TABLETS BY MOUTH ONCE DAILY 45 tablet 0   • furosemide (LASIX) 40 MG tablet TAKE ONE AND ONE-HALF TABLETS BY MOUTH ONCE DAILY 135 tablet 3   • hydrALAZINE (APRESOLINE) 25 MG tablet TAKE ONE TABLET BY MOUTH THREE TIMES DAILY 90 tablet 11   • KLOR-CON 20 MEQ CR tablet TAKE TWO TABLETS BY MOUTH ONCE DAILY 60 tablet 0   • KLOR-CON 20 MEQ CR tablet TAKE TWO TABLETS BY MOUTH ONCE DAILY 60 tablet 0   • metFORMIN (GLUCOPHAGE) 500 MG tablet Take 1 tablet by mouth 2 (Two) Times a Day With Meals for 180 days. 60 tablet 5   • metoprolol tartrate (LOPRESSOR) 50 MG tablet TAKE ONE TABLET BY MOUTH TWICE DAILY 60 tablet 11   • pioglitazone (ACTOS) 30 MG tablet Take 1 tablet by mouth Daily for 180 days. 30 tablet 5   • valsartan (DIOVAN) 320 MG tablet Take 1 tablet by mouth Daily for 180 days. 30 tablet 5   • [DISCONTINUED] alendronate (FOSAMAX) 70 MG tablet Take 1 tablet by mouth Every 7 (Seven) Days for 180 days. 4 tablet 5   • [DISCONTINUED] atorvastatin (LIPITOR) 20 MG tablet Take 1 tablet by mouth Daily for 180 days. 30 tablet 5   • [DISCONTINUED] febuxostat (ULORIC) 40 MG tablet Take 1 tablet by mouth Daily for 180 days. 30 tablet 5   • [DISCONTINUED] metFORMIN (GLUCOPHAGE) 500 MG tablet Take 1 tablet by mouth 2 (Two) Times a Day With Meals for 180 days. 60 tablet 5   • [DISCONTINUED] pioglitazone (ACTOS) 15 MG tablet Take 1 tablet by mouth Daily for 180 days. 30 tablet 5   • [DISCONTINUED] valsartan (DIOVAN) 320 MG tablet Take 1 tablet by mouth Daily  for 180 days. 30 tablet 5   • allopurinol (ZYLOPRIM) 100 MG tablet Take 1 tablet by mouth Daily for 180 days. 30 tablet 5     No facility-administered encounter medications on file as of 11/27/2017.        Orders Placed This Encounter   Procedures   • Comprehensive metabolic panel     Standing Status:   Future     Standing Expiration Date:   8/24/2018   • Lipid Panel With LDL/HDL Ratio     Standing Status:   Future     Standing Expiration Date:   8/24/2018   • Uric Acid     Standing Status:   Future     Standing Expiration Date:   8/24/2018   • Hemoglobin A1c     Standing Status:   Future     Standing Expiration Date:   8/24/2018   • MicroAlbumin, Urine, Random - Urine, Clean Catch     Standing Status:   Future     Standing Expiration Date:   8/24/2018   • CBC and Differential     Standing Status:   Future     Standing Expiration Date:   8/24/2018     Order Specific Question:   Manual Differential     Answer:   No            F/U in 6 months for regular visit and Medicare Wellness Visit

## 2017-12-01 RX ORDER — POTASSIUM CHLORIDE 1500 MG/1
TABLET, EXTENDED RELEASE ORAL
Qty: 60 TABLET | Refills: 11 | Status: SHIPPED | OUTPATIENT
Start: 2017-12-01 | End: 2019-11-06

## 2018-03-13 ENCOUNTER — CLINICAL SUPPORT NO REQUIREMENTS (OUTPATIENT)
Dept: CARDIOLOGY | Facility: CLINIC | Age: 83
End: 2018-03-13

## 2018-03-13 DIAGNOSIS — I48.20 CHRONIC ATRIAL FIBRILLATION (HCC): Primary | ICD-10-CM

## 2018-03-13 PROCEDURE — 93288 INTERROG EVL PM/LDLS PM IP: CPT | Performed by: INTERNAL MEDICINE

## 2018-04-26 ENCOUNTER — RESULTS ENCOUNTER (OUTPATIENT)
Dept: FAMILY MEDICINE CLINIC | Facility: CLINIC | Age: 83
End: 2018-04-26

## 2018-04-26 DIAGNOSIS — E78.49 OTHER HYPERLIPIDEMIA: ICD-10-CM

## 2018-04-26 DIAGNOSIS — M1A.09X0 IDIOPATHIC CHRONIC GOUT OF MULTIPLE SITES WITHOUT TOPHUS: ICD-10-CM

## 2018-04-26 DIAGNOSIS — E11.65 UNCONTROLLED TYPE 2 DIABETES MELLITUS WITH HYPERGLYCEMIA, WITHOUT LONG-TERM CURRENT USE OF INSULIN (HCC): ICD-10-CM

## 2018-05-03 LAB
ALBUMIN SERPL-MCNC: 4.1 G/DL (ref 3.5–5.2)
ALBUMIN/GLOB SERPL: 1.4 G/DL
ALP SERPL-CCNC: 61 U/L (ref 39–117)
ALT SERPL-CCNC: 12 U/L (ref 1–33)
AST SERPL-CCNC: 17 U/L (ref 1–32)
BASOPHILS # BLD AUTO: 0.02 10*3/MM3 (ref 0–0.2)
BASOPHILS NFR BLD AUTO: 0.4 % (ref 0–1.5)
BILIRUB SERPL-MCNC: 1.1 MG/DL (ref 0.1–1.2)
BUN SERPL-MCNC: 23 MG/DL (ref 8–23)
BUN/CREAT SERPL: 23.2 (ref 7–25)
CALCIUM SERPL-MCNC: 9.6 MG/DL (ref 8.6–10.5)
CHLORIDE SERPL-SCNC: 100 MMOL/L (ref 98–107)
CHOLEST SERPL-MCNC: 119 MG/DL (ref 0–200)
CO2 SERPL-SCNC: 24.3 MMOL/L (ref 22–29)
CREAT SERPL-MCNC: 0.99 MG/DL (ref 0.57–1)
EOSINOPHIL # BLD AUTO: 0.24 10*3/MM3 (ref 0–0.7)
EOSINOPHIL NFR BLD AUTO: 4.3 % (ref 0.3–6.2)
ERYTHROCYTE [DISTWIDTH] IN BLOOD BY AUTOMATED COUNT: 17.2 % (ref 11.7–13)
GFR SERPLBLD CREATININE-BSD FMLA CKD-EPI: 53 ML/MIN/1.73
GFR SERPLBLD CREATININE-BSD FMLA CKD-EPI: 64 ML/MIN/1.73
GLOBULIN SER CALC-MCNC: 2.9 GM/DL
GLUCOSE SERPL-MCNC: 147 MG/DL (ref 65–99)
HBA1C MFR BLD: 6.78 % (ref 4.8–5.6)
HCT VFR BLD AUTO: 34.9 % (ref 35.6–45.5)
HDLC SERPL-MCNC: 43 MG/DL (ref 40–60)
HGB BLD-MCNC: 10.8 G/DL (ref 11.9–15.5)
IMM GRANULOCYTES # BLD: 0.01 10*3/MM3 (ref 0–0.03)
IMM GRANULOCYTES NFR BLD: 0.2 % (ref 0–0.5)
LDLC SERPL CALC-MCNC: 54 MG/DL (ref 0–100)
LDLC/HDLC SERPL: 1.26 {RATIO}
LYMPHOCYTES # BLD AUTO: 1.37 10*3/MM3 (ref 0.9–4.8)
LYMPHOCYTES NFR BLD AUTO: 24.5 % (ref 19.6–45.3)
MCH RBC QN AUTO: 32.9 PG (ref 26.9–32)
MCHC RBC AUTO-ENTMCNC: 30.9 G/DL (ref 32.4–36.3)
MCV RBC AUTO: 106.4 FL (ref 80.5–98.2)
MICROALBUMIN UR-MCNC: 25.8 UG/ML
MONOCYTES # BLD AUTO: 0.38 10*3/MM3 (ref 0.2–1.2)
MONOCYTES NFR BLD AUTO: 6.8 % (ref 5–12)
NEUTROPHILS # BLD AUTO: 3.58 10*3/MM3 (ref 1.9–8.1)
NEUTROPHILS NFR BLD AUTO: 64 % (ref 42.7–76)
PLATELET # BLD AUTO: 250 10*3/MM3 (ref 140–500)
POTASSIUM SERPL-SCNC: 4.3 MMOL/L (ref 3.5–5.2)
PROT SERPL-MCNC: 7 G/DL (ref 6–8.5)
RBC # BLD AUTO: 3.28 10*6/MM3 (ref 3.9–5.2)
SODIUM SERPL-SCNC: 140 MMOL/L (ref 136–145)
TRIGL SERPL-MCNC: 110 MG/DL (ref 0–150)
URATE SERPL-MCNC: 7.3 MG/DL (ref 2.4–5.7)
VLDLC SERPL CALC-MCNC: 22 MG/DL (ref 5–40)
WBC # BLD AUTO: 5.59 10*3/MM3 (ref 4.5–10.7)

## 2018-05-09 ENCOUNTER — OFFICE VISIT (OUTPATIENT)
Dept: FAMILY MEDICINE CLINIC | Facility: CLINIC | Age: 83
End: 2018-05-09

## 2018-05-09 ENCOUNTER — TELEPHONE (OUTPATIENT)
Dept: FAMILY MEDICINE CLINIC | Facility: CLINIC | Age: 83
End: 2018-05-09

## 2018-05-09 VITALS
TEMPERATURE: 97.2 F | SYSTOLIC BLOOD PRESSURE: 150 MMHG | WEIGHT: 155 LBS | DIASTOLIC BLOOD PRESSURE: 72 MMHG | HEIGHT: 66 IN | RESPIRATION RATE: 16 BRPM | HEART RATE: 70 BPM | BODY MASS INDEX: 24.91 KG/M2

## 2018-05-09 DIAGNOSIS — Z00.00 MEDICARE ANNUAL WELLNESS VISIT, INITIAL: Primary | ICD-10-CM

## 2018-05-09 DIAGNOSIS — E78.49 OTHER HYPERLIPIDEMIA: ICD-10-CM

## 2018-05-09 DIAGNOSIS — D53.9 MACROCYTIC ANEMIA: Primary | ICD-10-CM

## 2018-05-09 DIAGNOSIS — N95.1 MENOPAUSAL STATE: ICD-10-CM

## 2018-05-09 DIAGNOSIS — I48.19 PERSISTENT ATRIAL FIBRILLATION (HCC): ICD-10-CM

## 2018-05-09 DIAGNOSIS — M1A.09X0 IDIOPATHIC CHRONIC GOUT OF MULTIPLE SITES WITHOUT TOPHUS: ICD-10-CM

## 2018-05-09 DIAGNOSIS — I10 ESSENTIAL HYPERTENSION: ICD-10-CM

## 2018-05-09 DIAGNOSIS — E11.3293 CONTROLLED TYPE 2 DIABETES MELLITUS WITH MILD NONPROLIFERATIVE RETINOPATHY OF BOTH EYES, WITHOUT LONG-TERM CURRENT USE OF INSULIN, MACULAR EDEMA PRESENCE UNSPECIFIED (HCC): ICD-10-CM

## 2018-05-09 DIAGNOSIS — M81.0 AGE-RELATED OSTEOPOROSIS WITHOUT CURRENT PATHOLOGICAL FRACTURE: ICD-10-CM

## 2018-05-09 DIAGNOSIS — Z95.0 HISTORY OF PACEMAKER: ICD-10-CM

## 2018-05-09 DIAGNOSIS — Z12.31 ENCOUNTER FOR SCREENING MAMMOGRAM FOR BREAST CANCER: ICD-10-CM

## 2018-05-09 DIAGNOSIS — M19.049 HAND ARTHRITIS: ICD-10-CM

## 2018-05-09 DIAGNOSIS — E11.9 TYPE 2 DIABETES MELLITUS WITHOUT COMPLICATION, WITHOUT LONG-TERM CURRENT USE OF INSULIN (HCC): ICD-10-CM

## 2018-05-09 PROCEDURE — 99214 OFFICE O/P EST MOD 30 MIN: CPT | Performed by: FAMILY MEDICINE

## 2018-05-09 PROCEDURE — G0438 PPPS, INITIAL VISIT: HCPCS | Performed by: FAMILY MEDICINE

## 2018-05-09 RX ORDER — PIOGLITAZONEHYDROCHLORIDE 30 MG/1
30 TABLET ORAL DAILY
Qty: 30 TABLET | Refills: 5 | Status: SHIPPED | OUTPATIENT
Start: 2018-05-09 | End: 2018-12-31 | Stop reason: SDUPTHER

## 2018-05-09 RX ORDER — ATORVASTATIN CALCIUM 20 MG/1
20 TABLET, FILM COATED ORAL DAILY
Qty: 30 TABLET | Refills: 5 | Status: SHIPPED | OUTPATIENT
Start: 2018-05-09 | End: 2018-11-14 | Stop reason: SDUPTHER

## 2018-05-09 RX ORDER — ALENDRONATE SODIUM 70 MG/1
70 TABLET ORAL
Qty: 4 TABLET | Refills: 5 | Status: SHIPPED | OUTPATIENT
Start: 2018-05-09 | End: 2018-11-14 | Stop reason: SDUPTHER

## 2018-05-09 RX ORDER — ALLOPURINOL 100 MG/1
100 TABLET ORAL DAILY
Qty: 30 TABLET | Refills: 5 | Status: SHIPPED | OUTPATIENT
Start: 2018-05-09 | End: 2019-05-15 | Stop reason: SDUPTHER

## 2018-05-09 RX ORDER — VALSARTAN 320 MG/1
320 TABLET ORAL DAILY
Qty: 30 TABLET | Refills: 5 | Status: SHIPPED | OUTPATIENT
Start: 2018-05-09 | End: 2018-08-02

## 2018-05-09 NOTE — TELEPHONE ENCOUNTER
I reviewed labs and notified patient of worsening anemia, macrocytic. I called patient to inform her of the finding and that I created a referral to hematology for this condition.

## 2018-05-09 NOTE — PROGRESS NOTES
QUICK REFERENCE INFORMATION:  The ABCs of the Annual Wellness Visit    Initial Medicare Wellness Visit    HEALTH RISK ASSESSMENT    8/18/1929    Recent Hospitalizations:  No hospitalization(s) within the last year..        Current Medical Providers:  Patient Care Team:  Harlan Arthur MD as PCP - General  SAMI Huntley as PCP - Claims Attributed  Jair Sarkar OD as Consulting Physician (Ophthalmology)  Ananth Yoo MD as Consulting Physician (Cardiology)  Quinten Kruse DPM (Podiatry)        Smoking Status:  History   Smoking Status   • Never Smoker   Smokeless Tobacco   • Never Used       Alcohol Consumption:  History   Alcohol Use No     Comment: occ/caffeine use       Depression Screen:   PHQ-2/PHQ-9 Depression Screening 5/9/2018   Little interest or pleasure in doing things 0   Feeling down, depressed, or hopeless 0   Total Score 0       Health Habits and Functional and Cognitive Screening:  Functional & Cognitive Status 5/9/2018   Do you have difficulty preparing food and eating? No   Do you have difficulty bathing yourself, getting dressed or grooming yourself? No   Do you have difficulty using the toilet? No   Do you have difficulty moving around from place to place? No   Do you have trouble with steps or getting out of a bed or a chair? No   In the past year have you fallen or experienced a near fall? No   Current Diet Well Balanced Diet   Dental Exam Up to date   Eye Exam Up to date   Exercise (times per week) 0 times per week   Current Exercise Activities Include None   Do you need help using the phone?  No   Are you deaf or do you have serious difficulty hearing?  No   Do you need help with transportation? No   Do you need help shopping? No   Do you need help preparing meals?  No   Do you need help with housework?  Yes   Do you need help with laundry? No   Do you need help taking your medications? No   Do you need help managing money? No   Do you ever drive or ride in a car  without wearing a seat belt? No   Have you felt unusual stress, anger or loneliness in the last month? No   Who do you live with? Spouse   If you need help, do you have trouble finding someone available to you? No   Do you have difficulty concentrating, remembering or making decisions? No           Does the patient have evidence of cognitive impairment? No remembered 2/3 items at 5 minutes    Asiprin use counseling: Taking ASA appropriately as indicated      Recent Lab Results:    Age-appropriate Screening Schedule:  Refer to the list below for future screening recommendations based on patient's age, sex and/or medical conditions. Orders for these recommended tests are listed in the plan section. The patient has been provided with a written plan.    Health Maintenance   Topic Date Due   • DIABETIC FOOT EXAM  09/16/2016   • MAMMOGRAM  03/16/2018   • DXA SCAN  04/12/2018   • INFLUENZA VACCINE  08/01/2018   • HEMOGLOBIN A1C  11/02/2018   • DIABETIC EYE EXAM  02/27/2019   • LIPID PANEL  05/02/2019   • URINE MICROALBUMIN  05/02/2019   • TDAP/TD VACCINES (2 - Td) 10/27/2026   • ZOSTER VACCINE  Completed   • PNEUMOCOCCAL VACCINES (65+ LOW/MEDIUM RISK)  Excluded        Chief Complaint   Patient presents with   • Hyperlipidemia   • Other     Medicare Wellness   • Hypertension   • Diabetes       Subjective   History of Present Illness    Val Jeronimo is a 88 y.o. female who presents for an Annual Wellness Visit. She is also here to review labs and refill medications.  Her blood pressure is stable and controlled on current therapy.  Her diabetes is controlled.  She has had a recent eye examination.  She is due for foot exam which was normal during today's visit.  She does see a podiatrist to trim her toenails.  Lipids are controlled.  She remains under the care of cardiology for persistent atrial fibrillation.  She continues to take medication for osteoporosis.  Screening mammogram and screening DEXA scan are due in these  have been ordered.    The following portions of the patient's history were reviewed and updated as appropriate: allergies, current medications, past family history, past medical history, past social history, past surgical history and problem list.    Outpatient Medications Prior to Visit   Medication Sig Dispense Refill   • aspirin 81 MG tablet Take by mouth daily.     • digoxin (LANOXIN) 125 MCG tablet TAKE ONE TABLET BY MOUTH ONCE DAILY 30 tablet 11   • furosemide (LASIX) 40 MG tablet TAKE ONE & ONE-HALF TABLETS BY MOUTH ONCE DAILY 45 tablet 0   • furosemide (LASIX) 40 MG tablet TAKE ONE AND ONE-HALF TABLETS BY MOUTH ONCE DAILY 135 tablet 3   • hydrALAZINE (APRESOLINE) 25 MG tablet TAKE ONE TABLET BY MOUTH THREE TIMES DAILY 90 tablet 11   • KLOR-CON 20 MEQ CR tablet TAKE TWO TABLETS BY MOUTH ONCE DAILY 60 tablet 0   • KLOR-CON 20 MEQ CR tablet TAKE TWO TABLETS BY MOUTH ONCE DAILY 60 tablet 11   • metoprolol tartrate (LOPRESSOR) 50 MG tablet TAKE ONE TABLET BY MOUTH TWICE DAILY 60 tablet 11   • alendronate (FOSAMAX) 70 MG tablet Take 1 tablet by mouth Every 7 (Seven) Days for 180 days. 4 tablet 5   • allopurinol (ZYLOPRIM) 100 MG tablet Take 1 tablet by mouth Daily for 180 days. 30 tablet 5   • atorvastatin (LIPITOR) 20 MG tablet Take 1 tablet by mouth Daily for 180 days. 30 tablet 5   • metFORMIN (GLUCOPHAGE) 500 MG tablet Take 1 tablet by mouth 2 (Two) Times a Day With Meals for 180 days. 60 tablet 5   • pioglitazone (ACTOS) 30 MG tablet Take 1 tablet by mouth Daily for 180 days. 30 tablet 5   • valsartan (DIOVAN) 320 MG tablet Take 1 tablet by mouth Daily for 180 days. 30 tablet 5     No facility-administered medications prior to visit.        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 Have asked the patient to send this to us to add to record.    Identification of Risk Factors:  Risk factors include: cardiovascular risk and increased fall risk.    Review of Systems   Constitutional: Negative for fatigue.   Cardiovascular: Negative for chest pain.       Compared to one year ago, the patient feels her physical health is the same.  Compared to one year ago, the patient feels her mental health is the same.    Objective     Physical Exam   Constitutional: She is cooperative. No distress.   Eyes: Conjunctivae and lids are normal.   Neck: Carotid bruit is not present. No tracheal deviation present.   Cardiovascular: Normal rate, regular rhythm and normal heart sounds.    No murmur heard.  Pulmonary/Chest: Effort normal and breath sounds normal.    Val had a diabetic foot exam performed (normal appearance) today.   During the foot exam she had a monofilament test performed (normal  sensation bilaterally ).    Neurological Sensory Findings -  No right ankle/foot altered proprioception and no left ankle/foot altered proprioception  Vascular Status -  Her right foot exhibits abnormal foot vasculature . Her right foot exhibits no edema. Her left foot exhibits abnormal foot vasculature . Her left foot exhibits no edema.  Skin Integrity  -  Her right foot skin is not intact.  Her left foot skin is not intact..  Neurological: She is alert. She is not disoriented.   Skin: Skin is warm and dry.   Psychiatric: She has a normal mood and affect. Her speech is normal and behavior is normal.   Vitals reviewed.      Vitals:    05/09/18 0957 05/09/18 1026   BP: 156/91 150/72   BP Location:  Right arm   Patient Position:  Sitting   Cuff Size:  Adult   Pulse: 70    Resp: 16    Temp: 97.2 °F (36.2 °C)    TempSrc: Oral    Weight: 70.3 kg (155 lb)    Height: 167.6 cm (66\")  "       Patient's Body mass index is 25.02 kg/m². BMI is within normal parameters. No follow-up required.      Assessment/Plan   Patient Self-Management and Personalized Health Advice  The patient has been provided with information about: exercise, prevention of cardiac or vascular disease and fall prevention and preventive services including:   · shingrix vaccine recommended.    Visit Diagnoses:    ICD-10-CM ICD-9-CM   1. Medicare annual wellness visit, initial  Fall prevention information shared with patient. shingrix vaccine advised Z00.00 V70.0   2. Age-related osteoporosis without current pathological fracture  Dexa ordered M81.0 733.01   3. Idiopathic chronic gout of multiple sites without tophus  Continue allopurinol [id number] 274.02   4. Other hyperlipidemia  Continue same medication E78.4 272.4   5.  E11.9 250.00   6. 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 Hand arthritis M19.049 716.94   8. Persistent atrial fibrillation  Same medications as per cardiology I48.1 427.31   9. History of pacemaker Z95.0 V12.50     V45.01   10. Menopausal state N95.1 627.2   11. Encounter for screening mammogram for breast cancer  Mammogram ordered Z12.31 V76.12       Orders Placed This Encounter   Procedures   • Mammo Screening Bilateral With CAD     Standing Status:   Future     Standing Expiration Date:   5/9/2019     Order Specific Question:   Reason for Exam:     Answer:   screening for breast cancer   • DEXA Bone Density Axial     Standing Status:   Future     Standing Expiration Date:   11/5/2018     Order Specific Question:   Reason for Exam:     Answer:   screen for osteoporosis   • Comprehensive metabolic panel     Standing Status:   Future     Standing Expiration Date:   2/3/2019   • Lipid Panel With LDL/HDL Ratio     Standing Status:    Future     Standing Expiration Date:   2/3/2019   • TSH     Standing Status:   Future     Standing Expiration Date:   2/3/2019   • Uric Acid     Standing Status:   Future     Standing Expiration Date:   2/3/2019   • Hemoglobin A1c     Standing Status:   Future     Standing Expiration Date:   2/3/2019   • MicroAlbumin, Urine, Random - Urine, Clean Catch     Standing Status:   Future     Standing Expiration Date:   2/3/2019   • CBC and Differential     Standing Status:   Future     Standing Expiration Date:   2/3/2019     Order Specific Question:   Manual Differential     Answer:   No       Outpatient Encounter Prescriptions as of 5/9/2018   Medication Sig Dispense Refill   • alendronate (FOSAMAX) 70 MG tablet Take 1 tablet by mouth Every 7 (Seven) Days for 180 days. 4 tablet 5   • allopurinol (ZYLOPRIM) 100 MG tablet Take 1 tablet by mouth Daily for 180 days. 30 tablet 5   • aspirin 81 MG tablet Take by mouth daily.     • atorvastatin (LIPITOR) 20 MG tablet Take 1 tablet by mouth Daily for 180 days. 30 tablet 5   • digoxin (LANOXIN) 125 MCG tablet TAKE ONE TABLET BY MOUTH ONCE DAILY 30 tablet 11   • furosemide (LASIX) 40 MG tablet TAKE ONE & ONE-HALF TABLETS BY MOUTH ONCE DAILY 45 tablet 0   • furosemide (LASIX) 40 MG tablet TAKE ONE AND ONE-HALF TABLETS BY MOUTH ONCE DAILY 135 tablet 3   • hydrALAZINE (APRESOLINE) 25 MG tablet TAKE ONE TABLET BY MOUTH THREE TIMES DAILY 90 tablet 11   • KLOR-CON 20 MEQ CR tablet TAKE TWO TABLETS BY MOUTH ONCE DAILY 60 tablet 0   • KLOR-CON 20 MEQ CR tablet TAKE TWO TABLETS BY MOUTH ONCE DAILY 60 tablet 11   • metFORMIN (GLUCOPHAGE) 500 MG tablet Take 1 tablet by mouth 2 (Two) Times a Day With Meals for 180 days. 60 tablet 5   • metoprolol tartrate (LOPRESSOR) 50 MG tablet TAKE ONE TABLET BY MOUTH TWICE DAILY 60 tablet 11   • pioglitazone (ACTOS) 30 MG tablet Take 1 tablet by mouth Daily for 180 days. 30 tablet 5   • valsartan (DIOVAN) 320 MG tablet Take 1 tablet by mouth Daily for  180 days. 30 tablet 5   • [DISCONTINUED] alendronate (FOSAMAX) 70 MG tablet Take 1 tablet by mouth Every 7 (Seven) Days for 180 days. 4 tablet 5   • [DISCONTINUED] allopurinol (ZYLOPRIM) 100 MG tablet Take 1 tablet by mouth Daily for 180 days. 30 tablet 5   • [DISCONTINUED] atorvastatin (LIPITOR) 20 MG tablet Take 1 tablet by mouth Daily for 180 days. 30 tablet 5   • [DISCONTINUED] metFORMIN (GLUCOPHAGE) 500 MG tablet Take 1 tablet by mouth 2 (Two) Times a Day With Meals for 180 days. 60 tablet 5   • [DISCONTINUED] pioglitazone (ACTOS) 30 MG tablet Take 1 tablet by mouth Daily for 180 days. 30 tablet 5   • [DISCONTINUED] valsartan (DIOVAN) 320 MG tablet Take 1 tablet by mouth Daily for 180 days. 30 tablet 5     No facility-administered encounter medications on file as of 5/9/2018.        Reviewed use of high risk medication in the elderly: yes  Reviewed for potential of harmful drug interactions in the elderly: not applicable    Follow Up:  Return in about 6 months (around 11/9/2018) for Recheck.     An After Visit Summary and PPPS with all of these plans were given to the patient.

## 2018-05-09 NOTE — PATIENT INSTRUCTIONS
Check on insurance coverage and cost for Shingrix (newest shingles vaccine) and get the immunization at your local pharmacy. It is more effective than the old Zostavax vaccine and is recommended even if you have had the Zostavax vaccine in the past. For more information, please look at the website below:    Https://www.cdc.gov/vaccines/vpd/shingles/public/shingrix/index.html        Exercising to Stay Healthy  Exercising regularly is important. It has many health benefits, such as:  · Improving your overall fitness, flexibility, and endurance.  · Increasing your bone density.  · Helping with weight control.  · Decreasing your body fat.  · Increasing your muscle strength.  · Reducing stress and tension.  · Improving your overall health.  In order to become healthy and stay healthy, it is recommended that you do moderate-intensity and vigorous-intensity exercise. You can tell that you are exercising at a moderate intensity if you have a higher heart rate and faster breathing, but you are still able to hold a conversation. You can tell that you are exercising at a vigorous intensity if you are breathing much harder and faster and cannot hold a conversation while exercising.  How often should I exercise?  Choose an activity that you enjoy and set realistic goals. Your health care provider can help you to make an activity plan that works for you. Exercise regularly as directed by your health care provider. This may include:  · Doing resistance training twice each week, such as:  ¨ Push-ups.  ¨ Sit-ups.  ¨ Lifting weights.  ¨ Using resistance bands.  · Doing a given intensity of exercise for a given amount of time. Choose from these options:  ¨ 150 minutes of moderate-intensity exercise every week.  ¨ 75 minutes of vigorous-intensity exercise every week.  ¨ A mix of moderate-intensity and vigorous-intensity exercise every week.  Children, pregnant women, people who are out of shape, people who are overweight, and older  adults may need to consult a health care provider for individual recommendations. If you have any sort of medical condition, be sure to consult your health care provider before starting a new exercise program.  What are some exercise ideas?  Some moderate-intensity exercise ideas include:  · Walking at a rate of 1 mile in 15 minutes.  · Biking.  · Hiking.  · Golfing.  · Dancing.  Some vigorous-intensity exercise ideas include:  · Walking at a rate of at least 4.5 miles per hour.  · Jogging or running at a rate of 5 miles per hour.  · Biking at a rate of at least 10 miles per hour.  · Lap swimming.  · Roller-skating or in-line skating.  · Cross-country skiing.  · Vigorous competitive sports, such as football, basketball, and soccer.  · Jumping rope.  · Aerobic dancing.  What are some everyday activities that can help me to get exercise?  · Yard work, such as:  ¨ Pushing a .  ¨ Raking and bagging leaves.  · Washing and waxing your car.  · Pushing a stroller.  · Shoveling snow.  · Gardening.  · Washing windows or floors.  How can I be more active in my day-to-day activities?  · Use the stairs instead of the elevator.  · Take a walk during your lunch break.  · If you drive, park your car farther away from work or school.  · If you take public transportation, get off one stop early and walk the rest of the way.  · Make all of your phone calls while standing up and walking around.  · Get up, stretch, and walk around every 30 minutes throughout the day.  What guidelines should I follow while exercising?  · Do not exercise so much that you hurt yourself, feel dizzy, or get very short of breath.  · Consult your health care provider before starting a new exercise program.  · Wear comfortable clothes and shoes with good support.  · Drink plenty of water while you exercise to prevent dehydration or heat stroke. Body water is lost during exercise and must be replaced.  · Work out until you breathe faster and your heart  beats faster.  This information is not intended to replace advice given to you by your health care provider. Make sure you discuss any questions you have with your health care provider.  Document Released: 01/20/2012 Document Revised: 05/25/2017 Document Reviewed: 05/21/2015  [employer] Interactive Patient Education © 2017 [employer]  Fall Prevention in the Home  Falls can cause injuries and can affect people from all age groups. There are many simple things that you can do to make your home safe and to help prevent falls.  What can I do on the outside of my home?  · Regularly repair the edges of walkways and driveways and fix any cracks.  · Remove high doorway thresholds.  · Trim any shrubbery on the main path into your home.  · Use bright outdoor lighting.  · Clear walkways of debris and clutter, including tools and rocks.  · Regularly check that handrails are securely fastened and in good repair. Both sides of any steps should have handrails.  · Install guardrails along the edges of any raised decks or porches.  · Have leaves, snow, and ice cleared regularly.  · Use sand or salt on walkways during winter months.  · In the garage, clean up any spills right away, including grease or oil spills.  What can I do in the bathroom?  · Use night lights.  · Install grab bars by the toilet and in the tub and shower. Do not use towel bars as grab bars.  · Use non-skid mats or decals on the floor of the tub or shower.  · If you need to sit down while you are in the shower, use a plastic, non-slip stool.  · Keep the floor dry. Immediately clean up any water that spills on the floor.  · Remove soap buildup in the tub or shower on a regular basis.  · Attach bath mats securely with double-sided non-slip rug tape.  · Remove throw rugs and other tripping hazards from the floor.  What can I do in the bedroom?  · Use night lights.  · Make sure that a bedside light is easy to reach.  · Do not use oversized bedding that drapes onto  the floor.  · Have a firm chair that has side arms to use for getting dressed.  · Remove throw rugs and other tripping hazards from the floor.  What can I do in the kitchen?  · Clean up any spills right away.  · Avoid walking on wet floors.  · Place frequently used items in easy-to-reach places.  · If you need to reach for something above you, use a sturdy step stool that has a grab bar.  · Keep electrical cables out of the way.  · Do not use floor polish or wax that makes floors slippery. If you have to use wax, make sure that it is non-skid floor wax.  · Remove throw rugs and other tripping hazards from the floor.  What can I do in the stairways?  · Do not leave any items on the stairs.  · Make sure that there are handrails on both sides of the stairs. Fix handrails that are broken or loose. Make sure that handrails are as long as the stairways.  · Check any carpeting to make sure that it is firmly attached to the stairs. Fix any carpet that is loose or worn.  · Avoid having throw rugs at the top or bottom of stairways, or secure the rugs with carpet tape to prevent them from moving.  · Make sure that you have a light switch at the top of the stairs and the bottom of the stairs. If you do not have them, have them installed.  What are some other fall prevention tips?  · Wear closed-toe shoes that fit well and support your feet. Wear shoes that have rubber soles or low heels.  · When you use a stepladder, make sure that it is completely opened and that the sides are firmly locked. Have someone hold the ladder while you are using it. Do not climb a closed stepladder.  · Add color or contrast paint or tape to grab bars and handrails in your home. Place contrasting color strips on the first and last steps.  · Use mobility aids as needed, such as canes, walkers, scooters, and crutches.  · Turn on lights if it is dark. Replace any light bulbs that burn out.  · Set up furniture so that there are clear paths. Keep the  furniture in the same spot.  · Fix any uneven floor surfaces.  · Choose a carpet design that does not hide the edge of steps of a stairway.  · Be aware of any and all pets.  · Review your medicines with your healthcare provider. Some medicines can cause dizziness or changes in blood pressure, which increase your risk of falling.  Talk with your health care provider about other ways that you can decrease your risk of falls. This may include working with a physical therapist or  to improve your strength, balance, and endurance.  This information is not intended to replace advice given to you by your health care provider. Make sure you discuss any questions you have with your health care provider.  Document Released: 12/08/2003 Document Revised: 05/16/2017 Document Reviewed: 01/22/2016  [employer] Interactive Patient Education © 2017 [employer]

## 2018-05-29 ENCOUNTER — APPOINTMENT (OUTPATIENT)
Dept: LAB | Facility: HOSPITAL | Age: 83
End: 2018-05-29

## 2018-06-06 ENCOUNTER — LAB (OUTPATIENT)
Dept: LAB | Facility: HOSPITAL | Age: 83
End: 2018-06-06

## 2018-06-06 ENCOUNTER — CONSULT (OUTPATIENT)
Dept: ONCOLOGY | Facility: CLINIC | Age: 83
End: 2018-06-06

## 2018-06-06 VITALS
TEMPERATURE: 97.3 F | BODY MASS INDEX: 28.45 KG/M2 | SYSTOLIC BLOOD PRESSURE: 120 MMHG | HEART RATE: 70 BPM | RESPIRATION RATE: 16 BRPM | HEIGHT: 62 IN | WEIGHT: 154.6 LBS | DIASTOLIC BLOOD PRESSURE: 72 MMHG | OXYGEN SATURATION: 99 %

## 2018-06-06 DIAGNOSIS — R79.89 ABNORMAL CBC: Primary | ICD-10-CM

## 2018-06-06 DIAGNOSIS — D64.9 ANEMIA, UNSPECIFIED TYPE: Primary | ICD-10-CM

## 2018-06-06 LAB
ALBUMIN SERPL-MCNC: 4.3 G/DL (ref 3.5–5.2)
ALBUMIN/GLOB SERPL: 1.3 G/DL (ref 1.1–2.4)
ALP SERPL-CCNC: 56 U/L (ref 38–116)
ALT SERPL W P-5'-P-CCNC: 12 U/L (ref 0–33)
ANION GAP SERPL CALCULATED.3IONS-SCNC: 16.5 MMOL/L
AST SERPL-CCNC: 20 U/L (ref 0–32)
BASOPHILS # BLD AUTO: 0.03 10*3/MM3 (ref 0–0.1)
BASOPHILS NFR BLD AUTO: 0.4 % (ref 0–1.1)
BILIRUB SERPL-MCNC: 1.1 MG/DL (ref 0.1–1.2)
BUN BLD-MCNC: 29 MG/DL (ref 6–20)
BUN/CREAT SERPL: 23.4 (ref 7.3–30)
CALCIUM SPEC-SCNC: 9.6 MG/DL (ref 8.5–10.2)
CHLORIDE SERPL-SCNC: 101 MMOL/L (ref 98–107)
CO2 SERPL-SCNC: 25.5 MMOL/L (ref 22–29)
CREAT BLD-MCNC: 1.24 MG/DL (ref 0.6–1.1)
DAT POLY-SP REAG RBC QL: NEGATIVE
DEPRECATED RDW RBC AUTO: 59.7 FL (ref 37–49)
EOSINOPHIL # BLD AUTO: 0.34 10*3/MM3 (ref 0–0.36)
EOSINOPHIL NFR BLD AUTO: 5 % (ref 1–5)
ERYTHROCYTE [DISTWIDTH] IN BLOOD BY AUTOMATED COUNT: 16 % (ref 11.7–14.5)
FERRITIN SERPL-MCNC: 40.8 NG/ML
GFR SERPL CREATININE-BSD FRML MDRD: 41 ML/MIN/1.73
GLOBULIN UR ELPH-MCNC: 3.3 GM/DL (ref 1.8–3.5)
GLUCOSE BLD-MCNC: 141 MG/DL (ref 74–124)
HAPTOGLOB SERPL-MCNC: 109 MG/DL (ref 30–200)
HCT VFR BLD AUTO: 32.9 % (ref 34–45)
HGB BLD-MCNC: 10.9 G/DL (ref 11.5–14.9)
HGB RETIC QN: 37.1 PG (ref 29.8–36.1)
IMM GRANULOCYTES # BLD: 0.01 10*3/MM3 (ref 0–0.03)
IMM GRANULOCYTES NFR BLD: 0.1 % (ref 0–0.5)
IMM RETICS NFR: 10.2 % (ref 3–15.8)
IRON 24H UR-MRATE: 91 MCG/DL (ref 37–145)
IRON SATN MFR SERPL: 20 % (ref 14–48)
LDH SERPL-CCNC: 257 U/L (ref 99–259)
LYMPHOCYTES # BLD AUTO: 1.14 10*3/MM3 (ref 1–3.5)
LYMPHOCYTES NFR BLD AUTO: 16.9 % (ref 20–49)
MCH RBC QN AUTO: 33.4 PG (ref 27–33)
MCHC RBC AUTO-ENTMCNC: 33.1 G/DL (ref 32–35)
MCV RBC AUTO: 100.9 FL (ref 83–97)
MONOCYTES # BLD AUTO: 0.57 10*3/MM3 (ref 0.25–0.8)
MONOCYTES NFR BLD AUTO: 8.4 % (ref 4–12)
NEUTROPHILS # BLD AUTO: 4.67 10*3/MM3 (ref 1.5–7)
NEUTROPHILS NFR BLD AUTO: 69.2 % (ref 39–75)
NRBC BLD MANUAL-RTO: 0 /100 WBC (ref 0–0)
PLATELET # BLD AUTO: 189 10*3/MM3 (ref 150–375)
PMV BLD AUTO: 10.1 FL (ref 8.9–12.1)
POTASSIUM BLD-SCNC: 4.3 MMOL/L (ref 3.5–4.7)
PROT SERPL-MCNC: 7.6 G/DL (ref 6.3–8)
RBC # BLD AUTO: 3.26 10*6/MM3 (ref 3.9–5)
RETICS/RBC NFR AUTO: 2.05 % (ref 0.6–2)
SODIUM BLD-SCNC: 143 MMOL/L (ref 134–145)
TIBC SERPL-MCNC: 452 MCG/DL (ref 249–505)
TRANSFERRIN SERPL-MCNC: 323 MG/DL (ref 200–360)
VIT B12 BLD-MCNC: 269 PG/ML (ref 211–946)
WBC NRBC COR # BLD: 6.76 10*3/MM3 (ref 4–10)

## 2018-06-06 PROCEDURE — 83010 ASSAY OF HAPTOGLOBIN QUANT: CPT | Performed by: INTERNAL MEDICINE

## 2018-06-06 PROCEDURE — 82607 VITAMIN B-12: CPT | Performed by: INTERNAL MEDICINE

## 2018-06-06 PROCEDURE — 36415 COLL VENOUS BLD VENIPUNCTURE: CPT | Performed by: INTERNAL MEDICINE

## 2018-06-06 PROCEDURE — 84466 ASSAY OF TRANSFERRIN: CPT | Performed by: INTERNAL MEDICINE

## 2018-06-06 PROCEDURE — 85025 COMPLETE CBC W/AUTO DIFF WBC: CPT | Performed by: INTERNAL MEDICINE

## 2018-06-06 PROCEDURE — 85046 RETICYTE/HGB CONCENTRATE: CPT | Performed by: INTERNAL MEDICINE

## 2018-06-06 PROCEDURE — 86880 COOMBS TEST DIRECT: CPT | Performed by: INTERNAL MEDICINE

## 2018-06-06 PROCEDURE — 99205 OFFICE O/P NEW HI 60 MIN: CPT | Performed by: INTERNAL MEDICINE

## 2018-06-06 PROCEDURE — 83540 ASSAY OF IRON: CPT | Performed by: INTERNAL MEDICINE

## 2018-06-06 PROCEDURE — 82728 ASSAY OF FERRITIN: CPT | Performed by: INTERNAL MEDICINE

## 2018-06-06 PROCEDURE — 80053 COMPREHEN METABOLIC PANEL: CPT | Performed by: INTERNAL MEDICINE

## 2018-06-06 PROCEDURE — 83615 LACTATE (LD) (LDH) ENZYME: CPT | Performed by: INTERNAL MEDICINE

## 2018-06-07 LAB
ALBUMIN SERPL-MCNC: 3.8 G/DL (ref 2.9–4.4)
ALBUMIN/GLOB SERPL: 1.1 {RATIO} (ref 0.7–1.7)
ALPHA1 GLOB FLD ELPH-MCNC: 0.3 G/DL (ref 0–0.4)
ALPHA2 GLOB SERPL ELPH-MCNC: 0.8 G/DL (ref 0.4–1)
B-GLOBULIN SERPL ELPH-MCNC: 1.2 G/DL (ref 0.7–1.3)
FOLATE BLD-MCNC: 344.8 NG/ML
FOLATE RBC-MCNC: 1064 NG/ML
GAMMA GLOB SERPL ELPH-MCNC: 1.3 G/DL (ref 0.4–1.8)
GLOBULIN SER CALC-MCNC: 3.6 G/DL (ref 2.2–3.9)
HCT VFR BLD AUTO: 32.4 % (ref 34–46.6)
IGA SERPL-MCNC: 279 MG/DL (ref 64–422)
IGG SERPL-MCNC: 1007 MG/DL (ref 700–1600)
IGM SERPL-MCNC: 102 MG/DL (ref 26–217)
Lab: NORMAL
M-SPIKE: NORMAL G/DL
PROT PATTERN SERPL IFE-IMP: NORMAL
PROT SERPL-MCNC: 7.4 G/DL (ref 6–8.5)

## 2018-06-11 ENCOUNTER — OFFICE VISIT (OUTPATIENT)
Dept: FAMILY MEDICINE CLINIC | Facility: CLINIC | Age: 83
End: 2018-06-11

## 2018-06-11 VITALS
RESPIRATION RATE: 16 BRPM | HEIGHT: 62 IN | SYSTOLIC BLOOD PRESSURE: 132 MMHG | HEART RATE: 82 BPM | TEMPERATURE: 99 F | OXYGEN SATURATION: 92 % | DIASTOLIC BLOOD PRESSURE: 86 MMHG

## 2018-06-11 DIAGNOSIS — R05.9 COUGH: Primary | ICD-10-CM

## 2018-06-11 DIAGNOSIS — J40 BRONCHITIS: ICD-10-CM

## 2018-06-11 PROCEDURE — 71046 X-RAY EXAM CHEST 2 VIEWS: CPT | Performed by: FAMILY MEDICINE

## 2018-06-11 PROCEDURE — 99214 OFFICE O/P EST MOD 30 MIN: CPT | Performed by: FAMILY MEDICINE

## 2018-06-11 RX ORDER — AMOXICILLIN AND CLAVULANATE POTASSIUM 500; 125 MG/1; MG/1
1 TABLET, FILM COATED ORAL 2 TIMES DAILY
Qty: 20 TABLET | Refills: 0 | Status: SHIPPED | OUTPATIENT
Start: 2018-06-11 | End: 2018-06-21

## 2018-06-11 NOTE — PROGRESS NOTES
"Chief Complaint   Patient presents with   • Cough   • Fatigue       Subjective   This patient presents the office with presumed pneumonia.  She's been coughing for the past 4 days or so.  Cough is nonproductive but is loose.  She has excessive fatigue.   has been diagnosed with pneumonia.  She took 2 of his pills on Thursday but none since.  She has associated loss of appetite over this timeframe.   I have reviewed and updated her medications, medical history and problem list during today's office visit.     Patient Care Team:  Harlan Arthur MD as PCP - General  Harlan Arthur MD as PCP - Claims Attributed  Jair Sarkar, LATOYA as Consulting Physician (Ophthalmology)  Ananth Yoo MD as Consulting Physician (Cardiology)  Quinten Kruse DPM (Podiatry)  Harlan Arthur MD as Referring Physician (Family Medicine)  Harlan Traore II, MD as Consulting Physician (Hematology and Oncology)    Social History   Substance Use Topics   • Smoking status: Never Smoker   • Smokeless tobacco: Never Used   • Alcohol use Yes      Comment: occ/caffeine use       Review of Systems   Constitutional: Positive for fatigue.   Respiratory: Positive for cough.    Gastrointestinal:        See HPI       Objective     /86   Pulse 82   Temp 99 °F (37.2 °C) (Oral)   Resp 16   Ht 158 cm (62.21\")   SpO2 92%     There is no height or weight on file to calculate BMI.    Physical Exam   Constitutional: She appears well-developed. No distress.   Cardiovascular: Normal rate, regular rhythm and normal heart sounds.    Pulmonary/Chest: Effort normal. She has wheezes in the left lower field. She has rhonchi in the left lower field.   Skin: She is not diaphoretic. No cyanosis. Nails show no clubbing.        Data Reviewed:CXR  Views: lateral and posterior-anterior    Relevant Clinical Issues/Diagnoses/Indications for XRay: see HPI  Clinical Findings: Chest: pacemaker seen with leads. Calcification in mediastinum on right. Some " vascular congestion. NAD    Compared with previous XRay? no    Date of Previous Xray:No previous xray available for comparison    Changes on current Xray? not applicable             Assessment/Plan     Problem List Items Addressed This Visit     None      Visit Diagnoses     Cough    -  Primary    Relevant Orders    XR Chest PA & Lateral    Bronchitis        Relevant Medications    amoxicillin-clavulanate (AUGMENTIN) 500-125 MG per tablet          Orders Placed This Encounter   Procedures   • XR Chest PA & Lateral     Order Specific Question:   Reason for Exam:     Answer:   possible pneumonia, LLL         Current Outpatient Prescriptions:   •  alendronate (FOSAMAX) 70 MG tablet, Take 1 tablet by mouth Every 7 (Seven) Days for 180 days., Disp: 4 tablet, Rfl: 5  •  allopurinol (ZYLOPRIM) 100 MG tablet, Take 1 tablet by mouth Daily for 180 days., Disp: 30 tablet, Rfl: 5  •  aspirin 81 MG tablet, Take by mouth daily., Disp: , Rfl:   •  atorvastatin (LIPITOR) 20 MG tablet, Take 1 tablet by mouth Daily for 180 days., Disp: 30 tablet, Rfl: 5  •  digoxin (LANOXIN) 125 MCG tablet, TAKE ONE TABLET BY MOUTH ONCE DAILY, Disp: 30 tablet, Rfl: 11  •  furosemide (LASIX) 40 MG tablet, TAKE ONE AND ONE-HALF TABLETS BY MOUTH ONCE DAILY, Disp: 135 tablet, Rfl: 3  •  hydrALAZINE (APRESOLINE) 25 MG tablet, TAKE ONE TABLET BY MOUTH THREE TIMES DAILY, Disp: 90 tablet, Rfl: 11  •  KLOR-CON 20 MEQ CR tablet, TAKE TWO TABLETS BY MOUTH ONCE DAILY, Disp: 60 tablet, Rfl: 11  •  metFORMIN (GLUCOPHAGE) 500 MG tablet, Take 1 tablet by mouth 2 (Two) Times a Day With Meals for 180 days., Disp: 60 tablet, Rfl: 5  •  metoprolol tartrate (LOPRESSOR) 50 MG tablet, TAKE ONE TABLET BY MOUTH TWICE DAILY, Disp: 60 tablet, Rfl: 11  •  pioglitazone (ACTOS) 30 MG tablet, Take 1 tablet by mouth Daily for 180 days., Disp: 30 tablet, Rfl: 5  •  valsartan (DIOVAN) 320 MG tablet, Take 1 tablet by mouth Daily for 180 days., Disp: 30 tablet, Rfl: 5  •   amoxicillin-clavulanate (AUGMENTIN) 500-125 MG per tablet, Take 1 tablet by mouth 2 (Two) Times a Day for 10 days., Disp: 20 tablet, Rfl: 0    Return in 4 weeks (on 7/6/2018) for Recheck.

## 2018-06-18 NOTE — PROGRESS NOTES
.     REASON FOR FOLLOWUP :   Macrocytosis, anemia    HISTORY OF PRESENT ILLNESS:  The patient is a 88 y.o. year old female  who is here for follow-up with the above-mentioned history.  No new problems since last visit.  Denies bleeding, chest pain, shortness of air, dizziness.      Past Medical History:   Diagnosis Date   • Arthritis    • Atrial fibrillation    • CHF (congestive heart failure)    • Degeneration of thoracic intervertebral disc    • Diabetes mellitus    • Gout    • H/O Pulmonary nodule 10/2013   • History of anemia 10/2013   • History of cellulitis 2016    Finger of left hand   • Hyperlipidemia    • Hypertension    • Odontoid fracture    • Osteoporosis    • Pacemaker      Past Surgical History:   Procedure Laterality Date   • NECK SURGERY  10/2013    Broken neck repair   • PACEMAKER IMPLANTATION         HEMATOLOGIC/ONCOLOGIC HISTORY:  (History from previous dates can be found in the separate document.)    MEDICATIONS    Current Outpatient Prescriptions:   •  alendronate (FOSAMAX) 70 MG tablet, Take 1 tablet by mouth Every 7 (Seven) Days for 180 days., Disp: 4 tablet, Rfl: 5  •  allopurinol (ZYLOPRIM) 100 MG tablet, Take 1 tablet by mouth Daily for 180 days., Disp: 30 tablet, Rfl: 5  •  amoxicillin-clavulanate (AUGMENTIN) 500-125 MG per tablet, Take 1 tablet by mouth 2 (Two) Times a Day for 10 days., Disp: 20 tablet, Rfl: 0  •  aspirin 81 MG tablet, Take by mouth daily., Disp: , Rfl:   •  atorvastatin (LIPITOR) 20 MG tablet, Take 1 tablet by mouth Daily for 180 days., Disp: 30 tablet, Rfl: 5  •  digoxin (LANOXIN) 125 MCG tablet, TAKE ONE TABLET BY MOUTH ONCE DAILY, Disp: 30 tablet, Rfl: 11  •  furosemide (LASIX) 40 MG tablet, TAKE ONE AND ONE-HALF TABLETS BY MOUTH ONCE DAILY, Disp: 135 tablet, Rfl: 3  •  hydrALAZINE (APRESOLINE) 25 MG tablet, TAKE ONE TABLET BY MOUTH THREE TIMES DAILY, Disp: 90 tablet, Rfl: 11  •  KLOR-CON 20 MEQ CR tablet, TAKE TWO TABLETS BY MOUTH ONCE DAILY, Disp: 60 tablet,  Rfl: 11  •  metFORMIN (GLUCOPHAGE) 500 MG tablet, Take 1 tablet by mouth 2 (Two) Times a Day With Meals for 180 days., Disp: 60 tablet, Rfl: 5  •  metoprolol tartrate (LOPRESSOR) 50 MG tablet, TAKE ONE TABLET BY MOUTH TWICE DAILY, Disp: 60 tablet, Rfl: 11  •  pioglitazone (ACTOS) 30 MG tablet, Take 1 tablet by mouth Daily for 180 days., Disp: 30 tablet, Rfl: 5  •  valsartan (DIOVAN) 320 MG tablet, Take 1 tablet by mouth Daily for 180 days., Disp: 30 tablet, Rfl: 5    ALLERGIES:     Allergies   Allergen Reactions   • Advil [Ibuprofen]        SOCIAL HISTORY:       Social History     Social History   • Marital status:      Spouse name: Noah   • Number of children: 5   • Years of education: High School     Occupational History   • Homemaker Retired     Social History Main Topics   • Smoking status: Never Smoker   • Smokeless tobacco: Never Used   • Alcohol use Yes      Comment: occ/caffeine use   • Drug use: No   • Sexual activity: Defer     Other Topics Concern   • Not on file     Social History Narrative   • No narrative on file         FAMILY HISTORY:  Family History   Problem Relation Age of Onset   • Heart disease Father    • Cancer Sister    • Kidney cancer Sister 61        Renal cell carcinoma   • Leukemia Son 44        CML   • Other Daughter         Osteopoikilosis   • Colon cancer Daughter 64       REVIEW OF SYSTEMS:  Review of Systems   Constitutional: Negative for activity change.   HENT: Negative for nosebleeds and trouble swallowing.    Respiratory: Negative for shortness of breath and wheezing.    Cardiovascular: Negative for chest pain and palpitations.   Gastrointestinal: Negative for constipation, diarrhea and nausea.   Genitourinary: Negative for dysuria and hematuria.   Musculoskeletal: Negative for arthralgias and myalgias.   Skin: Negative for rash and wound.   Neurological: Negative for seizures and syncope.   Hematological: Negative for adenopathy. Does not bruise/bleed easily.  "  Psychiatric/Behavioral: Negative for confusion.              Vitals:    06/20/18 1041   BP: 130/70   Pulse: 70   Resp: 14   Temp: 97.7 °F (36.5 °C)   TempSrc: Oral   SpO2: 99%   Weight: 65.5 kg (144 lb 6.4 oz)   Height: 158 cm (62.21\")   PainSc: 0-No pain     Current Status 6/20/2018   ECOG score 0      PHYSICAL EXAM:      CONSTITUTIONAL:  Vital signs reviewed.  No distress, looks comfortable.  EYES:  Conjunctiva and lids unremarkable.  PERRLA  EARS,NOSE,MOUTH,THROAT:  Ears and nose appear unremarkable.  Lips, teeth, gums appear unremarkable.  RESPIRATORY:  Normal respiratory effort.  Lungs clear to auscultation bilaterally.  CARDIOVASCULAR:  Normal S1, S2.  No murmurs rubs or gallops.  No significant lower extremity edema.  GASTROINTESTINAL: Abdomen appears unremarkable.  Nontender.  No hepatomegaly.  No splenomegaly.  LYMPHATIC:  No cervical, supraclavicular, axillary lymphadenopathy.  SKIN:  Warm.  No rashes.  PSYCHIATRIC:  Normal judgment and insight.  Normal mood and affect.     RECENT LABS:        WBC   Date Value Ref Range Status   06/20/2018 7.78 4.00 - 10.00 10*3/mm3 Final   06/06/2018 6.76 4.00 - 10.00 10*3/mm3 Final   05/02/2018 5.59 4.50 - 10.70 10*3/mm3 Final   11/20/2017 6.56 4.50 - 10.70 10*3/mm3 Final   09/21/2017 6.73 4.50 - 10.70 10*3/mm3 Final   03/06/2017 7.08 4.50 - 10.70 10*3/mm3 Final   09/12/2016 5.86 4.50 - 10.70 10*3/mm3 Final   03/15/2016 6.8 3.4 - 10.8 x10E3/uL Final   04/28/2014 9.70 4.50 - 10.70 K/Cumm Final   01/29/2014 4.04 (L) 4.50 - 10.70 K/Cumm Final   01/28/2014 4.27 (L) 4.50 - 10.70 K/Cumm Final   01/26/2014 6.84 4.50 - 10.70 K/Cumm Final     Hemoglobin   Date Value Ref Range Status   06/20/2018 11.2 (L) 11.5 - 14.9 g/dL Final   06/06/2018 10.9 (L) 11.5 - 14.9 g/dL Final   05/02/2018 10.8 (L) 11.9 - 15.5 g/dL Final   11/20/2017 12.3 11.9 - 15.5 g/dL Final   09/21/2017 12.4 11.9 - 15.5 g/dL Final   03/06/2017 12.7 11.9 - 15.5 g/dL Final   09/12/2016 12.5 11.9 - 15.5 g/dL Final "   03/15/2016 11.7 11.1 - 15.9 g/dL Final   04/28/2014 12.1 11.9 - 15.5 g/dL Final   01/29/2014 12.6 11.9 - 15.5 g/dL Final   01/28/2014 12.6 11.9 - 15.5 g/dL Final   01/26/2014 12.3 11.9 - 15.5 g/dL Final     Platelets   Date Value Ref Range Status   06/20/2018 216 150 - 375 10*3/mm3 Final   06/06/2018 189 150 - 375 10*3/mm3 Final   05/02/2018 250 140 - 500 10*3/mm3 Final   11/20/2017 226 140 - 500 10*3/mm3 Final   09/21/2017 227 140 - 500 10*3/mm3 Final   03/06/2017 268 140 - 500 10*3/mm3 Final   09/12/2016 221 140 - 500 10*3/mm3 Final   03/15/2016 220 150 - 379 x10E3/uL Final   04/28/2014 216 140 - 500 K/Cumm Final   01/29/2014 150 140 - 500 K/Cumm Final   01/28/2014 151 140 - 500 K/Cumm Final   01/26/2014 166 140 - 500 K/Cumm Final       Assessment/Plan   There are no diagnoses linked to this encounter.    *Anemia.  Hb 10.8 on 5/2/18.  Hb normal on 11/20/17 and on prior labs.  Unremarkable: SPEP, S CATRACHITO, haptoglobin, LDH, total bilirubin, cait.  Ferritin not robust at 40, but 20% iron sat and retic Hb high, not low.  Therefore, doubt this is iron deficiency.    *b12 Borderline low, but normal at 269 with normal range 211-246 on 6/6/18.  (Oral B12 1000 µg daily, today, 6/20/18.    *Creatinine was 1.24 on 6/6/18.  On 5/2/18 and on prior labs, creatinine mostly 1.  Creatinine repeated today, 6/20/18:  1.26.    *Hyperkalemia.  Mild.  Avastin nurses to call her and make sure she is no longer taking her potassium supplement.  If she is, stop it.  Defer to her PCP if her Diovan stopped and any further workup or treatment of the renal insufficiency.    *Macrocytosis.  Gradually worsening.  Check macrocytosis labs.    *Pulmonary nodule, initially seen in 10/28/13.    CT April 2014 revealed some calcification, suggesting this was granulomatous disease.  Plan was to repeat one final CT around April 2015 and if stable know further follow-up CTs.  Patient did not return for follow-up.  At this point, if this were cancer, I  would expect it to be apparent by now.  She is 88 years old.  Patient and daughter do not want any further follow-up of this.    Plan  Begin oral B12.  M.D. CBC 4 months  Defer to PCP follow-up/treatment of renal insufficiency and mild hyperkalemia.    Case discussed with Dr. Arthur.  Family assisted with history.

## 2018-06-20 ENCOUNTER — OFFICE VISIT (OUTPATIENT)
Dept: ONCOLOGY | Facility: CLINIC | Age: 83
End: 2018-06-20

## 2018-06-20 ENCOUNTER — LAB (OUTPATIENT)
Dept: LAB | Facility: HOSPITAL | Age: 83
End: 2018-06-20

## 2018-06-20 ENCOUNTER — TELEPHONE (OUTPATIENT)
Dept: ONCOLOGY | Facility: HOSPITAL | Age: 83
End: 2018-06-20

## 2018-06-20 VITALS
WEIGHT: 144.4 LBS | HEART RATE: 70 BPM | DIASTOLIC BLOOD PRESSURE: 70 MMHG | OXYGEN SATURATION: 99 % | HEIGHT: 62 IN | SYSTOLIC BLOOD PRESSURE: 130 MMHG | RESPIRATION RATE: 14 BRPM | TEMPERATURE: 97.7 F | BODY MASS INDEX: 26.57 KG/M2

## 2018-06-20 DIAGNOSIS — D64.9 ANEMIA, UNSPECIFIED TYPE: ICD-10-CM

## 2018-06-20 DIAGNOSIS — D64.9 ANEMIA, UNSPECIFIED TYPE: Primary | ICD-10-CM

## 2018-06-20 DIAGNOSIS — E11.311 CONTROLLED TYPE 2 DIABETES MELLITUS WITH RETINOPATHY AND MACULAR EDEMA, WITHOUT LONG-TERM CURRENT USE OF INSULIN, UNSPECIFIED LATERALITY, UNSPECIFIED RETINOPATHY SEVERITY (HCC): Primary | ICD-10-CM

## 2018-06-20 LAB
ANION GAP SERPL CALCULATED.3IONS-SCNC: 13 MMOL/L
BASOPHILS # BLD AUTO: 0.05 10*3/MM3 (ref 0–0.1)
BASOPHILS NFR BLD AUTO: 0.6 % (ref 0–1.1)
BUN BLD-MCNC: 36 MG/DL (ref 6–20)
BUN/CREAT SERPL: 28.6 (ref 7.3–30)
CALCIUM SPEC-SCNC: 9.8 MG/DL (ref 8.5–10.2)
CHLORIDE SERPL-SCNC: 101 MMOL/L (ref 98–107)
CO2 SERPL-SCNC: 24 MMOL/L (ref 22–29)
CREAT BLD-MCNC: 1.26 MG/DL (ref 0.6–1.1)
DEPRECATED RDW RBC AUTO: 59.7 FL (ref 37–49)
EOSINOPHIL # BLD AUTO: 0.33 10*3/MM3 (ref 0–0.36)
EOSINOPHIL NFR BLD AUTO: 4.2 % (ref 1–5)
ERYTHROCYTE [DISTWIDTH] IN BLOOD BY AUTOMATED COUNT: 15.6 % (ref 11.7–14.5)
GFR SERPL CREATININE-BSD FRML MDRD: 40 ML/MIN/1.73
GLUCOSE BLD-MCNC: 143 MG/DL (ref 74–124)
HCT VFR BLD AUTO: 35.6 % (ref 34–45)
HGB BLD-MCNC: 11.2 G/DL (ref 11.5–14.9)
IMM GRANULOCYTES # BLD: 0.03 10*3/MM3 (ref 0–0.03)
IMM GRANULOCYTES NFR BLD: 0.4 % (ref 0–0.5)
LYMPHOCYTES # BLD AUTO: 1.06 10*3/MM3 (ref 1–3.5)
LYMPHOCYTES NFR BLD AUTO: 13.6 % (ref 20–49)
MCH RBC QN AUTO: 32.5 PG (ref 27–33)
MCHC RBC AUTO-ENTMCNC: 31.5 G/DL (ref 32–35)
MCV RBC AUTO: 103.2 FL (ref 83–97)
MONOCYTES # BLD AUTO: 0.5 10*3/MM3 (ref 0.25–0.8)
MONOCYTES NFR BLD AUTO: 6.4 % (ref 4–12)
NEUTROPHILS # BLD AUTO: 5.81 10*3/MM3 (ref 1.5–7)
NEUTROPHILS NFR BLD AUTO: 74.8 % (ref 39–75)
NRBC BLD MANUAL-RTO: 0 /100 WBC (ref 0–0)
PLATELET # BLD AUTO: 216 10*3/MM3 (ref 150–375)
PMV BLD AUTO: 10.2 FL (ref 8.9–12.1)
POTASSIUM BLD-SCNC: 4.9 MMOL/L (ref 3.5–4.7)
RBC # BLD AUTO: 3.45 10*6/MM3 (ref 3.9–5)
SODIUM BLD-SCNC: 138 MMOL/L (ref 134–145)
WBC NRBC COR # BLD: 7.78 10*3/MM3 (ref 4–10)

## 2018-06-20 PROCEDURE — 85025 COMPLETE CBC W/AUTO DIFF WBC: CPT | Performed by: INTERNAL MEDICINE

## 2018-06-20 PROCEDURE — 36415 COLL VENOUS BLD VENIPUNCTURE: CPT | Performed by: INTERNAL MEDICINE

## 2018-06-20 PROCEDURE — 99215 OFFICE O/P EST HI 40 MIN: CPT | Performed by: INTERNAL MEDICINE

## 2018-06-20 PROCEDURE — 80048 BASIC METABOLIC PNL TOTAL CA: CPT | Performed by: INTERNAL MEDICINE

## 2018-06-20 NOTE — TELEPHONE ENCOUNTER
----- Message from Harlan Traore II, MD sent at 6/20/2018  1:39 PM EDT -----   Please tell patient creatinine is not better, it is the same.  Also tell her her potassium is mildly high.  If she is taking potassium supplements, stop them.  Make sure she is not eating a lot of potassium-rich foods such as bananas.      Tell her I am sending a message to her PCP and would defer any further management of renal insufficiency or mildly elevated potassium to her PCP, Dr. Harlan Arthur.    (I sent a message to Dr. Harlan Arthur.  Therefore, no need for you to do that.  Just, please call the patient.  Thank you)      Spoke with pt. Pt states she has been taking potassium supplements and she is aware to stop taking those. She also states she has been eating a lot of bananas and I asked her to cut back on that. Informed pt that her PCP would be the one to manage any questions or concerns regarding her kidneys and potassium. Pt v/u

## 2018-08-02 ENCOUNTER — OFFICE VISIT (OUTPATIENT)
Dept: FAMILY MEDICINE CLINIC | Facility: CLINIC | Age: 83
End: 2018-08-02

## 2018-08-02 VITALS
DIASTOLIC BLOOD PRESSURE: 78 MMHG | BODY MASS INDEX: 27.97 KG/M2 | HEART RATE: 68 BPM | HEIGHT: 62 IN | RESPIRATION RATE: 16 BRPM | WEIGHT: 152 LBS | TEMPERATURE: 97.6 F | SYSTOLIC BLOOD PRESSURE: 149 MMHG

## 2018-08-02 DIAGNOSIS — L08.9 ABRASION OF LEG WITH INFECTION, RIGHT, INITIAL ENCOUNTER: Primary | ICD-10-CM

## 2018-08-02 DIAGNOSIS — S80.811A ABRASION OF LEG WITH INFECTION, RIGHT, INITIAL ENCOUNTER: Primary | ICD-10-CM

## 2018-08-02 DIAGNOSIS — I10 ESSENTIAL HYPERTENSION: ICD-10-CM

## 2018-08-02 PROCEDURE — 99214 OFFICE O/P EST MOD 30 MIN: CPT | Performed by: FAMILY MEDICINE

## 2018-08-02 RX ORDER — LANOLIN ALCOHOL/MO/W.PET/CERES
1000 CREAM (GRAM) TOPICAL DAILY
COMMUNITY

## 2018-08-02 RX ORDER — MOXIFLOXACIN HYDROCHLORIDE 400 MG/1
400 TABLET ORAL DAILY
Qty: 10 TABLET | Refills: 0 | Status: SHIPPED | OUTPATIENT
Start: 2018-08-02 | End: 2018-08-08

## 2018-08-02 RX ORDER — IRBESARTAN 300 MG/1
300 TABLET ORAL NIGHTLY
Qty: 90 TABLET | Refills: 0 | Status: SHIPPED | OUTPATIENT
Start: 2018-08-02 | End: 2018-11-14 | Stop reason: SDUPTHER

## 2018-08-02 NOTE — PROGRESS NOTES
"Chief Complaint   Patient presents with   • Sore on ankle   • Hypertension     Valsartan       Subjective   This patient presents the office with a nonhealing wound on the right lateral aspect of her distal right leg.  This has been present for several months.  Recently it was covered with a bandage and had some smell to it.  There has been some attention to it and it seems to be slightly improved.  The patient has no symptoms from it.  New    She is also here to change blood pressure medicines due to FDA recall of valsartan.  I have reviewed and updated her medications, medical history and problem list during today's office visit.     Patient Care Team:  Harlan Arthur MD as PCP - General  Harlan Arthur MD as PCP - Claims Attributed  Jair Sarkar OD as Consulting Physician (Ophthalmology)  Ananth Yoo MD as Consulting Physician (Cardiology)  Quinten Kruse DPM (Podiatry)  Harlan Arthur MD as Referring Physician (Family Medicine)  Harlan Traore II, MD as Consulting Physician (Hematology and Oncology)    Social History   Substance Use Topics   • Smoking status: Never Smoker   • Smokeless tobacco: Never Used   • Alcohol use Yes      Comment: occ/caffeine use       Review of Systems   Constitutional: Negative for fever.   Skin:        See hpi       Objective     /78   Pulse 68   Temp 97.6 °F (36.4 °C) (Oral)   Resp 16   Ht 158 cm (62.21\")   Wt 68.9 kg (152 lb)   BMI 27.61 kg/m²     Body mass index is 27.61 kg/m².    Physical Exam   Skin:   Mildly infected abrasion right lateral leg      Right lateral calf      Data Reviewed:             Assessment/Plan     Problem List Items Addressed This Visit     Essential hypertension    Relevant Medications    irbesartan (AVAPRO) 300 MG tablet    Abrasion of leg with infection, right, initial encounter - Primary    Relevant Medications    moxifloxacin (AVELOX) 400 MG tablet          No orders of the defined types were placed in this " encounter.        Current Outpatient Prescriptions:   •  alendronate (FOSAMAX) 70 MG tablet, Take 1 tablet by mouth Every 7 (Seven) Days for 180 days., Disp: 4 tablet, Rfl: 5  •  allopurinol (ZYLOPRIM) 100 MG tablet, Take 1 tablet by mouth Daily for 180 days., Disp: 30 tablet, Rfl: 5  •  aspirin 81 MG tablet, Take by mouth daily., Disp: , Rfl:   •  atorvastatin (LIPITOR) 20 MG tablet, Take 1 tablet by mouth Daily for 180 days., Disp: 30 tablet, Rfl: 5  •  digoxin (LANOXIN) 125 MCG tablet, TAKE ONE TABLET BY MOUTH ONCE DAILY, Disp: 30 tablet, Rfl: 11  •  furosemide (LASIX) 40 MG tablet, TAKE ONE AND ONE-HALF TABLETS BY MOUTH ONCE DAILY, Disp: 135 tablet, Rfl: 3  •  hydrALAZINE (APRESOLINE) 25 MG tablet, TAKE ONE TABLET BY MOUTH THREE TIMES DAILY, Disp: 90 tablet, Rfl: 11  •  KLOR-CON 20 MEQ CR tablet, TAKE TWO TABLETS BY MOUTH ONCE DAILY (Patient taking differently: TAKE 1 TABLET BY MOUTH ONCE DAILY), Disp: 60 tablet, Rfl: 11  •  metFORMIN (GLUCOPHAGE) 500 MG tablet, Take 1 tablet by mouth 2 (Two) Times a Day With Meals for 180 days., Disp: 60 tablet, Rfl: 5  •  metoprolol tartrate (LOPRESSOR) 50 MG tablet, TAKE ONE TABLET BY MOUTH TWICE DAILY, Disp: 60 tablet, Rfl: 11  •  pioglitazone (ACTOS) 30 MG tablet, Take 1 tablet by mouth Daily for 180 days., Disp: 30 tablet, Rfl: 5  •  vitamin B-12 (CYANOCOBALAMIN) 1000 MCG tablet, Take 1,000 mcg by mouth Daily., Disp: , Rfl:   •  irbesartan (AVAPRO) 300 MG tablet, Take 1 tablet by mouth Every Night for 90 days., Disp: 90 tablet, Rfl: 0  •  moxifloxacin (AVELOX) 400 MG tablet, Take 1 tablet by mouth Daily for 10 days., Disp: 10 tablet, Rfl: 0    Return in about 2 weeks (around 8/16/2018) for Recheck.

## 2018-08-06 ENCOUNTER — TELEPHONE (OUTPATIENT)
Dept: ONCOLOGY | Facility: CLINIC | Age: 83
End: 2018-08-06

## 2018-08-06 NOTE — TELEPHONE ENCOUNTER
----- Message from Ritu Gil sent at 8/6/2018 10:19 AM EDT -----  [phone] daughter Haritha  Ref:her potassium

## 2018-08-06 NOTE — TELEPHONE ENCOUNTER
S/w pt daughter kahlil.  States she thought her mother was to stop taking the k+ when she saw dr. Traore on 6/20/18, but realized today that her mom has been taking 1 daily since the 20th of June.  Looked back at dr. Traore's progress note and informed daughter that he did want pt. To stop the k+.  K+ that day was 4.9.  Pt. Has appt to see her pcp next week and she will have her  k+ level  Checked during that visit.   States she will make sure she doesn't take anymore.   Instructed to call for any issues.  V/u.

## 2018-08-08 ENCOUNTER — TELEPHONE (OUTPATIENT)
Dept: FAMILY MEDICINE CLINIC | Facility: CLINIC | Age: 83
End: 2018-08-08

## 2018-08-08 DIAGNOSIS — L08.9 ABRASION OF LEG WITH INFECTION, RIGHT, INITIAL ENCOUNTER: Primary | ICD-10-CM

## 2018-08-08 DIAGNOSIS — S80.811A ABRASION OF LEG WITH INFECTION, RIGHT, INITIAL ENCOUNTER: Primary | ICD-10-CM

## 2018-08-08 RX ORDER — CEPHALEXIN 500 MG/1
500 TABLET ORAL 4 TIMES DAILY
Qty: 40 TABLET | Refills: 0 | Status: SHIPPED | OUTPATIENT
Start: 2018-08-08 | End: 2018-08-16 | Stop reason: SDUPTHER

## 2018-08-09 DIAGNOSIS — E11.9 TYPE 2 DIABETES MELLITUS WITHOUT COMPLICATION, WITHOUT LONG-TERM CURRENT USE OF INSULIN (HCC): ICD-10-CM

## 2018-08-09 DIAGNOSIS — I10 ESSENTIAL HYPERTENSION: ICD-10-CM

## 2018-08-09 DIAGNOSIS — E78.49 OTHER HYPERLIPIDEMIA: ICD-10-CM

## 2018-08-09 DIAGNOSIS — M1A.09X0 IDIOPATHIC CHRONIC GOUT OF MULTIPLE SITES WITHOUT TOPHUS: ICD-10-CM

## 2018-08-10 LAB
ALBUMIN SERPL-MCNC: 4.2 G/DL (ref 3.5–5.2)
ALBUMIN/GLOB SERPL: 1.4 G/DL
ALP SERPL-CCNC: 63 U/L (ref 39–117)
ALT SERPL-CCNC: 13 U/L (ref 1–33)
AST SERPL-CCNC: 14 U/L (ref 1–32)
BASOPHILS # BLD AUTO: 0.03 10*3/MM3 (ref 0–0.2)
BASOPHILS NFR BLD AUTO: 0.6 % (ref 0–1.5)
BILIRUB SERPL-MCNC: 1 MG/DL (ref 0.1–1.2)
BUN SERPL-MCNC: 26 MG/DL (ref 8–23)
BUN/CREAT SERPL: 24.5 (ref 7–25)
CALCIUM SERPL-MCNC: 9.9 MG/DL (ref 8.6–10.5)
CHLORIDE SERPL-SCNC: 101 MMOL/L (ref 98–107)
CHOLEST SERPL-MCNC: 116 MG/DL (ref 0–200)
CO2 SERPL-SCNC: 24.1 MMOL/L (ref 22–29)
CREAT SERPL-MCNC: 1.06 MG/DL (ref 0.57–1)
EOSINOPHIL # BLD AUTO: 0.22 10*3/MM3 (ref 0–0.7)
EOSINOPHIL NFR BLD AUTO: 4.2 % (ref 0.3–6.2)
ERYTHROCYTE [DISTWIDTH] IN BLOOD BY AUTOMATED COUNT: 16.5 % (ref 11.7–13)
GLOBULIN SER CALC-MCNC: 2.9 GM/DL
GLUCOSE SERPL-MCNC: 126 MG/DL (ref 65–99)
HBA1C MFR BLD: 6.7 % (ref 4.8–5.6)
HCT VFR BLD AUTO: 36.1 % (ref 35.6–45.5)
HDLC SERPL-MCNC: 47 MG/DL (ref 40–60)
HGB BLD-MCNC: 10.9 G/DL (ref 11.9–15.5)
IMM GRANULOCYTES # BLD: 0 10*3/MM3 (ref 0–0.03)
IMM GRANULOCYTES NFR BLD: 0 % (ref 0–0.5)
LDLC SERPL CALC-MCNC: 51 MG/DL (ref 0–100)
LDLC/HDLC SERPL: 1.09 {RATIO}
LYMPHOCYTES # BLD AUTO: 0.95 10*3/MM3 (ref 0.9–4.8)
LYMPHOCYTES NFR BLD AUTO: 18.1 % (ref 19.6–45.3)
MCH RBC QN AUTO: 32.1 PG (ref 26.9–32)
MCHC RBC AUTO-ENTMCNC: 30.2 G/DL (ref 32.4–36.3)
MCV RBC AUTO: 106.2 FL (ref 80.5–98.2)
MICROALBUMIN UR-MCNC: <3 UG/ML
MONOCYTES # BLD AUTO: 0.41 10*3/MM3 (ref 0.2–1.2)
MONOCYTES NFR BLD AUTO: 7.8 % (ref 5–12)
NEUTROPHILS # BLD AUTO: 3.63 10*3/MM3 (ref 1.9–8.1)
NEUTROPHILS NFR BLD AUTO: 69.3 % (ref 42.7–76)
PLATELET # BLD AUTO: 187 10*3/MM3 (ref 140–500)
POTASSIUM SERPL-SCNC: 4 MMOL/L (ref 3.5–5.2)
PROT SERPL-MCNC: 7.1 G/DL (ref 6–8.5)
RBC # BLD AUTO: 3.4 10*6/MM3 (ref 3.9–5.2)
SODIUM SERPL-SCNC: 142 MMOL/L (ref 136–145)
TRIGL SERPL-MCNC: 88 MG/DL (ref 0–150)
TSH SERPL DL<=0.005 MIU/L-ACNC: 4.4 MIU/ML (ref 0.27–4.2)
URATE SERPL-MCNC: 8 MG/DL (ref 2.4–5.7)
VLDLC SERPL CALC-MCNC: 17.6 MG/DL (ref 5–40)
WBC # BLD AUTO: 5.24 10*3/MM3 (ref 4.5–10.7)

## 2018-08-16 ENCOUNTER — OFFICE VISIT (OUTPATIENT)
Dept: FAMILY MEDICINE CLINIC | Facility: CLINIC | Age: 83
End: 2018-08-16

## 2018-08-16 VITALS
TEMPERATURE: 97.5 F | DIASTOLIC BLOOD PRESSURE: 73 MMHG | HEART RATE: 69 BPM | BODY MASS INDEX: 27.42 KG/M2 | RESPIRATION RATE: 16 BRPM | WEIGHT: 149 LBS | SYSTOLIC BLOOD PRESSURE: 135 MMHG | HEIGHT: 62 IN

## 2018-08-16 DIAGNOSIS — S80.811A ABRASION OF LEG WITH INFECTION, RIGHT, INITIAL ENCOUNTER: Primary | ICD-10-CM

## 2018-08-16 DIAGNOSIS — N18.30 STAGE 3 CHRONIC KIDNEY DISEASE (HCC): ICD-10-CM

## 2018-08-16 DIAGNOSIS — L08.9 ABRASION OF LEG WITH INFECTION, RIGHT, INITIAL ENCOUNTER: Primary | ICD-10-CM

## 2018-08-16 DIAGNOSIS — E11.311 CONTROLLED TYPE 2 DIABETES MELLITUS WITH RETINOPATHY AND MACULAR EDEMA, WITHOUT LONG-TERM CURRENT USE OF INSULIN, UNSPECIFIED LATERALITY, UNSPECIFIED RETINOPATHY SEVERITY (HCC): ICD-10-CM

## 2018-08-16 DIAGNOSIS — I10 ESSENTIAL HYPERTENSION: ICD-10-CM

## 2018-08-16 DIAGNOSIS — M1A.09X0 IDIOPATHIC CHRONIC GOUT OF MULTIPLE SITES WITHOUT TOPHUS: ICD-10-CM

## 2018-08-16 PROCEDURE — 99214 OFFICE O/P EST MOD 30 MIN: CPT | Performed by: FAMILY MEDICINE

## 2018-08-16 RX ORDER — CEPHALEXIN 500 MG/1
500 TABLET ORAL 4 TIMES DAILY
Qty: 28 TABLET | Refills: 0 | Status: SHIPPED | OUTPATIENT
Start: 2018-08-16 | End: 2018-08-23

## 2018-08-16 NOTE — PROGRESS NOTES
"Chief Complaint   Patient presents with   • Leg Abrasion Follow Up       Subjective   This patient returns the office to recheck her infected abrasion on the right lateral lower leg.  The eschar came off.  The redness is improved.  She has about 2-3 more days of antibiotics remaining.  Overall it is feeling better.  Labs have been reviewed.  She continues to have some anemia.  She remains under the care of specialty for that condition.  Her kidney function has shown interval improvement.  She has stage III chronic kidney disease.  Her uric acid is elevated.  No recent gout attacks.  She is on allopurinol and cannot afford Uloric.  Uloric would be a better choice for her gout control.  Blood sugar reading is improved.  Next regular visit is in November. Potassium is normal.  I have reviewed and updated her medications, medical history and problem list during today's office visit.     Patient Care Team:  Harlan Arthur MD as PCP - General  Harlan Arthur MD as PCP - Claims Attributed  Jair Sarkar OD as Consulting Physician (Ophthalmology)  Ananth Yoo MD as Consulting Physician (Cardiology)  Quinten Kruse DPM (Podiatry)  Harlan Arthur MD as Referring Physician (Family Medicine)  Harlan Traore II, MD as Consulting Physician (Hematology and Oncology)    Social History   Substance Use Topics   • Smoking status: Never Smoker   • Smokeless tobacco: Never Used   • Alcohol use Yes      Comment: occ/caffeine use       Review of Systems   Constitutional: Negative for fever.   Skin:        improving       Objective     /73   Pulse 69   Temp 97.5 °F (36.4 °C) (Oral)   Resp 16   Ht 158 cm (62.21\")   Wt 67.6 kg (149 lb)   BMI 27.07 kg/m²     Body mass index is 27.07 kg/m².    Physical Exam   Constitutional: She is oriented to person, place, and time. No distress.   Cardiovascular: Normal rate and normal heart sounds.    Pulmonary/Chest: Effort normal and breath sounds normal.   Neurological: " She is alert and oriented to person, place, and time.   Skin: She is not diaphoretic.   Right leg is improving. escar gone. Decreased cellulitis,wound base dry.    Psychiatric: She has a normal mood and affect. Her speech is normal. She is attentive.   Vitals reviewed.       Data Reviewed:         CMP:  Lab Results   Component Value Date     (H) 08/09/2018    BUN 26 (H) 08/09/2018    CREATININE 1.06 (H) 08/09/2018    EGFRIFNONA 49 (L) 08/09/2018    EGFRIFAFRI 59 (L) 08/09/2018     08/09/2018    K 4.0 08/09/2018     08/09/2018    CALCIUM 9.9 08/09/2018    PROTENTOTREF 7.1 08/09/2018    ALBUMIN 4.20 08/09/2018    LABGLOBREF 2.9 08/09/2018    BILITOT 1.0 08/09/2018    ALKPHOS 63 08/09/2018    AST 14 08/09/2018    ALT 13 08/09/2018     CBC w/ diff:   Lab Results   Component Value Date    WBC 7.78 06/20/2018    RBC 3.40 (L) 08/09/2018    HGB 10.9 (L) 08/09/2018    HCT 36.1 08/09/2018    .2 (H) 08/09/2018    MCH 32.1 (H) 08/09/2018    MCHC 30.2 (L) 08/09/2018    RDW 16.5 (H) 08/09/2018     08/09/2018    NEUTRORELPCT 69.3 08/09/2018    AUTOIGPER 0.4 06/20/2018    LYMPHORELPCT 18.1 (L) 08/09/2018    MONORELPCT 7.8 08/09/2018    EOSRELPCT 4.2 08/09/2018    BASORELPCT 0.6 08/09/2018     LIPID PANEL:  Lab Results   Component Value Date    CHLPL 116 08/09/2018    TRIG 88 08/09/2018    HDL 47 08/09/2018    VLDL 17.6 08/09/2018    LDL 51 08/09/2018    LDLHDL 1.09 08/09/2018     TSH:  Lab Results   Component Value Date    TSH 4.400 (H) 08/09/2018     URIC ACID:  Lab Results   Component Value Date    URICACID 8.0 (H) 08/09/2018       Assessment/Plan     Problem List Items Addressed This Visit     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        Current Outpatient Prescriptions:   •  alendronate (FOSAMAX) 70 MG tablet, Take 1 tablet by mouth Every 7 (Seven) Days for 180 days., Disp: 4 tablet, Rfl: 5  •  allopurinol (ZYLOPRIM) 100 MG tablet, Take 1 tablet by mouth Daily for 180 days., Disp: 30 tablet, Rfl: 5  •  aspirin 81 MG tablet, Take by mouth daily., Disp: , Rfl:   •  atorvastatin (LIPITOR) 20 MG tablet, Take 1 tablet by mouth Daily for 180 days., Disp: 30 tablet, Rfl: 5  •  Cephalexin 500 MG tablet, Take 500 mg by mouth 4 (Four) Times a Day for 7 days., Disp: 28 tablet, Rfl: 0  •  digoxin (LANOXIN) 125 MCG tablet, TAKE ONE TABLET BY MOUTH ONCE DAILY, Disp: 30 tablet, Rfl: 11  •  furosemide (LASIX) 40 MG tablet, TAKE ONE AND ONE-HALF TABLETS BY MOUTH ONCE DAILY, Disp: 135 tablet, Rfl: 3  •  hydrALAZINE (APRESOLINE) 25 MG tablet, TAKE ONE TABLET BY MOUTH THREE TIMES DAILY, Disp: 90 tablet, Rfl: 11  •  irbesartan (AVAPRO) 300 MG tablet, Take 1 tablet by mouth Every Night for 90 days., Disp: 90 tablet, Rfl: 0  •  KLOR-CON 20 MEQ CR tablet, TAKE TWO TABLETS BY MOUTH ONCE DAILY (Patient taking differently: TAKE 1 TABLET BY MOUTH ONCE DAILY), Disp: 60 tablet, Rfl: 11  •  metFORMIN (GLUCOPHAGE) 500 MG tablet, Take 1 tablet by mouth 2 (Two) Times a Day With Meals for 180 days., Disp: 60 tablet, Rfl: 5  •  metoprolol tartrate (LOPRESSOR) 50 MG tablet, TAKE ONE TABLET BY MOUTH TWICE DAILY, Disp: 60 tablet, Rfl: 11  •  pioglitazone (ACTOS) 30 MG tablet, Take 1 tablet by mouth Daily for 180 days., Disp: 30 tablet, Rfl: 5  •  vitamin B-12 (CYANOCOBALAMIN) 1000 MCG tablet, Take 1,000 mcg by mouth Daily., Disp: , Rfl:     Return in 3 months (on 11/14/2018) for Next scheduled follow up.

## 2018-09-06 RX ORDER — METOPROLOL TARTRATE 50 MG/1
50 TABLET, FILM COATED ORAL 2 TIMES DAILY
Qty: 180 TABLET | Refills: 0 | Status: SHIPPED | OUTPATIENT
Start: 2018-09-06 | End: 2018-11-14 | Stop reason: SDUPTHER

## 2018-09-18 ENCOUNTER — DOCUMENTATION (OUTPATIENT)
Dept: CARDIOLOGY | Facility: CLINIC | Age: 83
End: 2018-09-18

## 2018-09-18 ENCOUNTER — CLINICAL SUPPORT NO REQUIREMENTS (OUTPATIENT)
Dept: CARDIOLOGY | Facility: CLINIC | Age: 83
End: 2018-09-18

## 2018-09-18 ENCOUNTER — TELEPHONE (OUTPATIENT)
Dept: CARDIOLOGY | Facility: CLINIC | Age: 83
End: 2018-09-18

## 2018-09-18 DIAGNOSIS — I48.19 PERSISTENT ATRIAL FIBRILLATION (HCC): Primary | ICD-10-CM

## 2018-09-18 PROCEDURE — 93279 PRGRMG DEV EVAL PM/LDLS PM: CPT | Performed by: INTERNAL MEDICINE

## 2018-09-18 NOTE — PROGRESS NOTES
Daughter called back to change Dec appt to March and stated that they have always come in every 6 months and not every 3 months.

## 2018-10-03 ENCOUNTER — APPOINTMENT (OUTPATIENT)
Dept: LAB | Facility: HOSPITAL | Age: 83
End: 2018-10-03

## 2018-10-03 ENCOUNTER — APPOINTMENT (OUTPATIENT)
Dept: ONCOLOGY | Facility: CLINIC | Age: 83
End: 2018-10-03

## 2018-10-09 RX ORDER — METOPROLOL TARTRATE 50 MG/1
TABLET, FILM COATED ORAL
Qty: 180 TABLET | Refills: 0 | Status: SHIPPED | OUTPATIENT
Start: 2018-10-09 | End: 2019-03-10 | Stop reason: SDUPTHER

## 2018-10-23 RX ORDER — DIGOXIN 125 MCG
TABLET ORAL
Qty: 30 TABLET | Refills: 11 | Status: SHIPPED | OUTPATIENT
Start: 2018-10-23 | End: 2019-10-07 | Stop reason: SDUPTHER

## 2018-10-30 DIAGNOSIS — I10 ESSENTIAL HYPERTENSION: ICD-10-CM

## 2018-10-31 RX ORDER — IRBESARTAN 300 MG/1
TABLET ORAL
Qty: 90 TABLET | Refills: 0 | OUTPATIENT
Start: 2018-10-31

## 2018-11-03 DIAGNOSIS — I10 ESSENTIAL HYPERTENSION: ICD-10-CM

## 2018-11-05 RX ORDER — IRBESARTAN 300 MG/1
TABLET ORAL
Qty: 90 TABLET | Refills: 0 | OUTPATIENT
Start: 2018-11-05

## 2018-11-08 ENCOUNTER — OFFICE VISIT (OUTPATIENT)
Dept: CARDIOLOGY | Facility: CLINIC | Age: 83
End: 2018-11-08

## 2018-11-08 VITALS
SYSTOLIC BLOOD PRESSURE: 122 MMHG | BODY MASS INDEX: 25.76 KG/M2 | HEART RATE: 73 BPM | DIASTOLIC BLOOD PRESSURE: 80 MMHG | WEIGHT: 140 LBS | HEIGHT: 62 IN

## 2018-11-08 DIAGNOSIS — I48.19 PERSISTENT ATRIAL FIBRILLATION (HCC): Primary | ICD-10-CM

## 2018-11-08 DIAGNOSIS — I10 ESSENTIAL HYPERTENSION: ICD-10-CM

## 2018-11-08 PROCEDURE — 93000 ELECTROCARDIOGRAM COMPLETE: CPT | Performed by: INTERNAL MEDICINE

## 2018-11-08 PROCEDURE — 99213 OFFICE O/P EST LOW 20 MIN: CPT | Performed by: INTERNAL MEDICINE

## 2018-11-08 NOTE — PROGRESS NOTES
Subjective:     Encounter Date:18      Patient ID: Val Jeronimo is a 89 y.o. female.    Chief Complaint:  Atrial Fibrillation   Presents for follow-up visit. Symptoms are negative for palpitations and tachycardia. The symptoms have been stable. Past medical history includes atrial fibrillation.   Hypertension   This is a chronic problem. The problem is controlled. Pertinent negatives include no palpitations.       88-year-old female who presents today for reevaluation.  Patient has history of atrial fibrillation as well as sick sinus syndrome.  Her EKG shows a pacemaker she's followed through our pacemaker department with no recent issues.  Patient says she denies any type of heart issues like palpitations lightheadedness swelling chest pain or shortness of breath.  She said she was just thinking the other day how soren she is not to have any significant heart symptoms.    Review of Systems   Cardiovascular: Negative for palpitations.   Skin: Positive for poor wound healing.   All other systems reviewed and are negative.        ECG 12 Lead  Date/Time: 2018 11:06 AM  Performed by: JOSEFA RAIN  Authorized by: JOSEFA RAIN   Comparison: compared with previous ECG from 2017  Similar to previous ECG  Rhythm: atrial fibrillation  Pacin% capture  Clinical impression: abnormal ECG               Objective:     Physical Exam   Constitutional: She is oriented to person, place, and time. She appears well-developed.   HENT:   Head: Normocephalic.   Eyes: Conjunctivae are normal.   Neck: Normal range of motion.   Cardiovascular: Normal rate, regular rhythm and normal heart sounds.    Pulmonary/Chest: Breath sounds normal.   Abdominal: Soft. Bowel sounds are normal.   Musculoskeletal: Normal range of motion. She exhibits no edema.   Neurological: She is alert and oriented to person, place, and time.   Skin: Skin is warm and dry.   Psychiatric: She has a normal mood and affect. Her behavior  is normal.   Vitals reviewed.      Lab Review:       Assessment:          Diagnosis Plan   1. Persistent atrial fibrillation (CMS/HCC)     2. Essential hypertension            Plan:          1. History of chronic atrial fibrillation. Heart rate looks good.  2. History of sick sinus syndrome status post pacemaker.  3.  Hypertension blood pressures excellent   4. Patient has an open wound that is being followed by Dr. Arthur.  Overall she is doing well no symptoms would see in one year sooner if issues.    Atrial Fibrillation and Atrial Flutter  Assessment  • The patient has permanent atrial fibrillation  • This is non-valvular in etiology  • The patient's CHADS2-VASc score is 4  • A [id number] score of 2 or more is considered a high risk for a thromboembolic event  • Warfarin not prescribed for medical reasons    Plan  • Continue in atrial fibrillation with rate control  • Continue aspirin for antithrombotic therapy, bleeding issues discussed  • Continue beta blocker for rate control

## 2018-11-12 ENCOUNTER — LAB (OUTPATIENT)
Dept: LAB | Facility: HOSPITAL | Age: 83
End: 2018-11-12

## 2018-11-12 ENCOUNTER — OFFICE VISIT (OUTPATIENT)
Dept: ONCOLOGY | Facility: CLINIC | Age: 83
End: 2018-11-12

## 2018-11-12 VITALS
BODY MASS INDEX: 25.76 KG/M2 | DIASTOLIC BLOOD PRESSURE: 78 MMHG | HEIGHT: 62 IN | TEMPERATURE: 98.8 F | SYSTOLIC BLOOD PRESSURE: 158 MMHG | HEART RATE: 75 BPM | RESPIRATION RATE: 16 BRPM | OXYGEN SATURATION: 95 % | WEIGHT: 140 LBS

## 2018-11-12 DIAGNOSIS — D64.9 ANEMIA, UNSPECIFIED TYPE: ICD-10-CM

## 2018-11-12 DIAGNOSIS — D64.9 ANEMIA, UNSPECIFIED TYPE: Primary | ICD-10-CM

## 2018-11-12 LAB
BASOPHILS # BLD AUTO: 0.04 10*3/MM3 (ref 0–0.1)
BASOPHILS NFR BLD AUTO: 0.6 % (ref 0–1.1)
DEPRECATED RDW RBC AUTO: 54.8 FL (ref 37–49)
EOSINOPHIL # BLD AUTO: 0.27 10*3/MM3 (ref 0–0.36)
EOSINOPHIL NFR BLD AUTO: 3.9 % (ref 1–5)
ERYTHROCYTE [DISTWIDTH] IN BLOOD BY AUTOMATED COUNT: 15.3 % (ref 11.7–14.5)
HCT VFR BLD AUTO: 38 % (ref 34–45)
HGB BLD-MCNC: 12.8 G/DL (ref 11.5–14.9)
IMM GRANULOCYTES # BLD: 0.03 10*3/MM3 (ref 0–0.03)
IMM GRANULOCYTES NFR BLD: 0.4 % (ref 0–0.5)
LYMPHOCYTES # BLD AUTO: 1.42 10*3/MM3 (ref 1–3.5)
LYMPHOCYTES NFR BLD AUTO: 20.4 % (ref 20–49)
MCH RBC QN AUTO: 32.8 PG (ref 27–33)
MCHC RBC AUTO-ENTMCNC: 33.7 G/DL (ref 32–35)
MCV RBC AUTO: 97.4 FL (ref 83–97)
MONOCYTES # BLD AUTO: 0.62 10*3/MM3 (ref 0.25–0.8)
MONOCYTES NFR BLD AUTO: 8.9 % (ref 4–12)
NEUTROPHILS # BLD AUTO: 4.59 10*3/MM3 (ref 1.5–7)
NEUTROPHILS NFR BLD AUTO: 65.8 % (ref 39–75)
NRBC BLD MANUAL-RTO: 0 /100 WBC (ref 0–0)
PLATELET # BLD AUTO: 221 10*3/MM3 (ref 150–375)
PMV BLD AUTO: 10.3 FL (ref 8.9–12.1)
RBC # BLD AUTO: 3.9 10*6/MM3 (ref 3.9–5)
WBC NRBC COR # BLD: 6.97 10*3/MM3 (ref 4–10)

## 2018-11-12 PROCEDURE — 36415 COLL VENOUS BLD VENIPUNCTURE: CPT | Performed by: INTERNAL MEDICINE

## 2018-11-12 PROCEDURE — 99214 OFFICE O/P EST MOD 30 MIN: CPT | Performed by: INTERNAL MEDICINE

## 2018-11-12 PROCEDURE — 85025 COMPLETE CBC W/AUTO DIFF WBC: CPT | Performed by: INTERNAL MEDICINE

## 2018-11-12 NOTE — PROGRESS NOTES
.     REASON FOR FOLLOWUP :   Macrocytosis, anemia    HISTORY OF PRESENT ILLNESS:  The patient is a 89 y.o. year old female  who is here for follow-up with the above-mentioned history.    No new problems.  Denies bleeding, chest pain, shortness of breath, dizziness, weakness.    Taking oral B12, but only 500 µg daily.    Past Medical History:   Diagnosis Date   • Arthritis    • Atrial fibrillation (CMS/HCC)    • CHF (congestive heart failure) (CMS/HCC)    • Degeneration of thoracic intervertebral disc    • Diabetes mellitus (CMS/HCC)    • Gout    • H/O Pulmonary nodule 10/2013   • History of anemia 10/2013   • History of cellulitis 2016    Finger of left hand   • Hyperlipidemia    • Hypertension    • Odontoid fracture (CMS/HCC)    • Osteoporosis    • Pacemaker      Past Surgical History:   Procedure Laterality Date   • NECK SURGERY  10/2013    Broken neck repair   • PACEMAKER IMPLANTATION         HEMATOLOGIC/ONCOLOGIC HISTORY:  (History from previous dates can be found in the separate document.)    MEDICATIONS    Current Outpatient Medications:   •  alendronate (FOSAMAX) 70 MG tablet, Take 1 tablet by mouth Every 7 (Seven) Days for 180 days., Disp: 4 tablet, Rfl: 5  •  aspirin 81 MG tablet, Take by mouth daily., Disp: , Rfl:   •  atorvastatin (LIPITOR) 20 MG tablet, Take 1 tablet by mouth Daily for 180 days., Disp: 30 tablet, Rfl: 5  •  digoxin (LANOXIN) 125 MCG tablet, TAKE ONE TABLET BY MOUTH ONCE DAILY, Disp: 30 tablet, Rfl: 11  •  furosemide (LASIX) 40 MG tablet, TAKE ONE AND ONE-HALF TABLETS BY MOUTH ONCE DAILY, Disp: 135 tablet, Rfl: 3  •  hydrALAZINE (APRESOLINE) 25 MG tablet, TAKE ONE TABLET BY MOUTH THREE TIMES DAILY, Disp: 90 tablet, Rfl: 11  •  irbesartan (AVAPRO) 300 MG tablet, Take 1 tablet by mouth Every Night for 90 days., Disp: 90 tablet, Rfl: 0  •  KLOR-CON 20 MEQ CR tablet, TAKE TWO TABLETS BY MOUTH ONCE DAILY (Patient taking differently: TAKE 1 TABLET BY MOUTH ONCE DAILY), Disp: 60 tablet,  Rfl: 11  •  metFORMIN (GLUCOPHAGE) 500 MG tablet, Take 1 tablet by mouth 2 (Two) Times a Day With Meals for 180 days., Disp: 60 tablet, Rfl: 5  •  metoprolol tartrate (LOPRESSOR) 50 MG tablet, Take 1 tablet by mouth 2 (Two) Times a Day., Disp: 180 tablet, Rfl: 0  •  metoprolol tartrate (LOPRESSOR) 50 MG tablet, TAKE ONE TABLET BY MOUTH TWICE DAILY, Disp: 180 tablet, Rfl: 0  •  pioglitazone (ACTOS) 30 MG tablet, Take 1 tablet by mouth Daily for 180 days., Disp: 30 tablet, Rfl: 5  •  vitamin B-12 (CYANOCOBALAMIN) 1000 MCG tablet, Take 1,000 mcg by mouth Daily., Disp: , Rfl:     ALLERGIES:     Allergies   Allergen Reactions   • Advil [Ibuprofen]        SOCIAL HISTORY:       Social History     Socioeconomic History   • Marital status:      Spouse name: Noah   • Number of children: 5   • Years of education: High School   • Highest education level: Not on file   Social Needs   • Financial resource strain: Not on file   • Food insecurity - worry: Not on file   • Food insecurity - inability: Not on file   • Transportation needs - medical: Not on file   • Transportation needs - non-medical: Not on file   Occupational History   • Occupation: Homemaker     Employer: RETIRED   Tobacco Use   • Smoking status: Never Smoker   • Smokeless tobacco: Never Used   Substance and Sexual Activity   • Alcohol use: Yes     Comment: occ/caffeine use   • Drug use: No   • Sexual activity: Defer   Other Topics Concern   • Not on file   Social History Narrative   • Not on file         FAMILY HISTORY:  Family History   Problem Relation Age of Onset   • Heart disease Father    • Cancer Sister    • Kidney cancer Sister 61        Renal cell carcinoma   • Leukemia Son 44        CML   • Colon cancer Daughter 64       REVIEW OF SYSTEMS:  Review of Systems   Constitutional: Negative for activity change.   HENT: Negative for nosebleeds and trouble swallowing.    Respiratory: Negative for shortness of breath and wheezing.    Cardiovascular:  "Negative for chest pain and palpitations.   Gastrointestinal: Negative for constipation, diarrhea and nausea.   Genitourinary: Negative for dysuria and hematuria.   Musculoskeletal: Negative for arthralgias and myalgias.   Skin: Negative for rash and wound.   Neurological: Negative for seizures and syncope.   Hematological: Negative for adenopathy. Does not bruise/bleed easily.   Psychiatric/Behavioral: Negative for confusion.              Vitals:    11/12/18 1632   BP: 158/78   Pulse: 75   Resp: 16   Temp: 98.8 °F (37.1 °C)   TempSrc: Oral   SpO2: 95%   Weight: 63.5 kg (140 lb)   Height: 158 cm (62.21\")   PainSc: 0-No pain     Current Status 11/12/2018   ECOG score 0      PHYSICAL EXAM:    CONSTITUTIONAL:  Vital signs reviewed.  No distress, looks comfortable.  EYES:  Conjunctiva and lids unremarkable.  PERRLA  EARS,NOSE,MOUTH,THROAT:  Ears and nose appear unremarkable.  Lips, teeth, gums appear unremarkable.  RESPIRATORY:  Normal respiratory effort.  Lungs clear to auscultation bilaterally.  CARDIOVASCULAR:  Normal S1, S2.  No murmurs rubs or gallops.  No significant lower extremity edema.  GASTROINTESTINAL: Abdomen appears unremarkable.  Nontender.  No hepatomegaly.  No splenomegaly.  LYMPHATIC:  No cervical, supraclavicular, axillary lymphadenopathy.  SKIN:  Warm.  No rashes.  PSYCHIATRIC:  Normal judgment and insight.  Normal mood and affect.      RECENT LABS:        WBC   Date Value Ref Range Status   11/12/2018 6.97 4.00 - 10.00 10*3/mm3 Final   06/20/2018 7.78 4.00 - 10.00 10*3/mm3 Final   06/06/2018 6.76 4.00 - 10.00 10*3/mm3 Final   05/02/2018 5.59 4.50 - 10.70 10*3/mm3 Final   11/20/2017 6.56 4.50 - 10.70 10*3/mm3 Final   09/21/2017 6.73 4.50 - 10.70 10*3/mm3 Final   03/06/2017 7.08 4.50 - 10.70 10*3/mm3 Final   09/12/2016 5.86 4.50 - 10.70 10*3/mm3 Final   03/15/2016 6.8 3.4 - 10.8 x10E3/uL Final   04/28/2014 9.70 4.50 - 10.70 K/Cumm Final   01/29/2014 4.04 (L) 4.50 - 10.70 K/Cumm Final   01/28/2014 4.27 " (L) 4.50 - 10.70 K/Cumm Final   01/26/2014 6.84 4.50 - 10.70 K/Cumm Final     Hemoglobin   Date Value Ref Range Status   11/12/2018 12.8 11.5 - 14.9 g/dL Final   08/09/2018 10.9 (L) 11.9 - 15.5 g/dL Final   06/20/2018 11.2 (L) 11.5 - 14.9 g/dL Final   06/06/2018 10.9 (L) 11.5 - 14.9 g/dL Final   05/02/2018 10.8 (L) 11.9 - 15.5 g/dL Final   11/20/2017 12.3 11.9 - 15.5 g/dL Final   09/21/2017 12.4 11.9 - 15.5 g/dL Final   03/06/2017 12.7 11.9 - 15.5 g/dL Final   09/12/2016 12.5 11.9 - 15.5 g/dL Final   03/15/2016 11.7 11.1 - 15.9 g/dL Final   04/28/2014 12.1 11.9 - 15.5 g/dL Final   01/29/2014 12.6 11.9 - 15.5 g/dL Final   01/28/2014 12.6 11.9 - 15.5 g/dL Final   01/26/2014 12.3 11.9 - 15.5 g/dL Final     Platelets   Date Value Ref Range Status   11/12/2018 221 150 - 375 10*3/mm3 Final   08/09/2018 187 140 - 500 10*3/mm3 Final   06/20/2018 216 150 - 375 10*3/mm3 Final   06/06/2018 189 150 - 375 10*3/mm3 Final   05/02/2018 250 140 - 500 10*3/mm3 Final   11/20/2017 226 140 - 500 10*3/mm3 Final   09/21/2017 227 140 - 500 10*3/mm3 Final   03/06/2017 268 140 - 500 10*3/mm3 Final   09/12/2016 221 140 - 500 10*3/mm3 Final   03/15/2016 220 150 - 379 x10E3/uL Final   04/28/2014 216 140 - 500 K/Cumm Final   01/29/2014 150 140 - 500 K/Cumm Final   01/28/2014 151 140 - 500 K/Cumm Final   01/26/2014 166 140 - 500 K/[facility] Final       Assessment/Plan   Anemia, unspecified type  - CBC & Differential  - Vitamin B12      *Anemia.  Hb 10.8 on 5/2/18.  Hb normal on 11/20/17 and on prior labs.  Unremarkable: SPEP, S CATRACHITO, haptoglobin, LDH, total bilirubin, cait.  Ferritin not robust at 40, but 20% iron sat and retic Hb high, not low.  Therefore, doubt this is iron deficiency.  Hb 12.8 today, which is normal.    *b12 Borderline low, but normal at 269 with normal range 211-246 on 6/6/18.  (Oral B12 500 µg daily, started 6/20/18.    *Creatinine was 1.24 on 6/6/18.  On 5/2/18 and on prior labs, creatinine mostly 1.  Creatinine repeated  6/20/18:  1.26.    *Hyperkalemia.  Mild.  Avastin nurses to call her and make sure she is no longer taking her potassium supplement.  If she is, stop it.  Defer to her PCP if her Diovan stopped and any further workup or treatment of the renal insufficiency.    *Macrocytosis.  Gradually worsening.  MCV improved from 101-106, down to 97 with oral B12.    *Pulmonary nodule, initially seen in 10/28/13.    CT April 2014 revealed some calcification, suggesting this was granulomatous disease.  Plan was to repeat one final CT around April 2015 and if stable know further follow-up CTs.  Patient did not return for follow-up.  At this point, if this were cancer, I would expect it to be apparent by now.  She is 88 years old.  Patient and daughter do not want any further follow-up of this.    Plan  Continue oral 500 µg B12, daily.  M.D. CBC B12 level 4 months    Daughter assisted with history.

## 2018-11-14 ENCOUNTER — OFFICE VISIT (OUTPATIENT)
Dept: FAMILY MEDICINE CLINIC | Facility: CLINIC | Age: 83
End: 2018-11-14

## 2018-11-14 VITALS
RESPIRATION RATE: 16 BRPM | HEIGHT: 62 IN | DIASTOLIC BLOOD PRESSURE: 70 MMHG | BODY MASS INDEX: 26.31 KG/M2 | TEMPERATURE: 97.6 F | SYSTOLIC BLOOD PRESSURE: 130 MMHG | WEIGHT: 143 LBS | HEART RATE: 78 BPM

## 2018-11-14 DIAGNOSIS — M1A.09X0 IDIOPATHIC CHRONIC GOUT OF MULTIPLE SITES WITHOUT TOPHUS: ICD-10-CM

## 2018-11-14 DIAGNOSIS — E78.49 OTHER HYPERLIPIDEMIA: ICD-10-CM

## 2018-11-14 DIAGNOSIS — I10 ESSENTIAL HYPERTENSION: Primary | ICD-10-CM

## 2018-11-14 DIAGNOSIS — E11.9 TYPE 2 DIABETES MELLITUS WITHOUT COMPLICATION, WITHOUT LONG-TERM CURRENT USE OF INSULIN (HCC): ICD-10-CM

## 2018-11-14 DIAGNOSIS — S80.811D ABRASION OF RIGHT LOWER EXTREMITY, SUBSEQUENT ENCOUNTER: ICD-10-CM

## 2018-11-14 DIAGNOSIS — E11.3293 CONTROLLED TYPE 2 DIABETES MELLITUS WITH MILD NONPROLIFERATIVE RETINOPATHY OF BOTH EYES, WITHOUT LONG-TERM CURRENT USE OF INSULIN, MACULAR EDEMA PRESENCE UNSPECIFIED (HCC): ICD-10-CM

## 2018-11-14 DIAGNOSIS — M81.0 AGE-RELATED OSTEOPOROSIS WITHOUT CURRENT PATHOLOGICAL FRACTURE: ICD-10-CM

## 2018-11-14 DIAGNOSIS — Z23 IMMUNIZATION DUE: ICD-10-CM

## 2018-11-14 PROCEDURE — G0008 ADMIN INFLUENZA VIRUS VAC: HCPCS | Performed by: FAMILY MEDICINE

## 2018-11-14 PROCEDURE — 99214 OFFICE O/P EST MOD 30 MIN: CPT | Performed by: FAMILY MEDICINE

## 2018-11-14 PROCEDURE — 90662 IIV NO PRSV INCREASED AG IM: CPT | Performed by: FAMILY MEDICINE

## 2018-11-14 RX ORDER — ATORVASTATIN CALCIUM 20 MG/1
20 TABLET, FILM COATED ORAL DAILY
Qty: 30 TABLET | Refills: 5 | Status: SHIPPED | OUTPATIENT
Start: 2018-11-14 | End: 2019-05-15 | Stop reason: SDUPTHER

## 2018-11-14 RX ORDER — IRBESARTAN 300 MG/1
300 TABLET ORAL NIGHTLY
Qty: 30 TABLET | Refills: 5 | Status: SHIPPED | OUTPATIENT
Start: 2018-11-14 | End: 2019-05-06 | Stop reason: SDUPTHER

## 2018-11-14 RX ORDER — ALENDRONATE SODIUM 70 MG/1
70 TABLET ORAL
Qty: 4 TABLET | Refills: 5 | Status: SHIPPED | OUTPATIENT
Start: 2018-11-14 | End: 2019-05-15 | Stop reason: SDUPTHER

## 2018-11-14 NOTE — PROGRESS NOTES
"Chief Complaint   Patient presents with   • Hypertension       Subjective     Val Jeronimo presents to the office today to refill her medications and review recent labs. No medication side effects are reported. Her blood pressure is controlled today.  Lipids are stable with current therapy.  Diabetes is controlled.  She continues to take medication for osteoporosis.  Overall, she feels well.     I have reviewed and updated her medications, medical history and problem list during today's office visit.      Patient Care Team:  Harlan Arthur MD as PCP - General  Harlan Arthur MD as PCP - Claims Attributed  Jair Sarkar OD as Consulting Physician (Ophthalmology)  Ananth Yoo MD as Consulting Physician (Cardiology)  Quinten Kruse DPM (Podiatry)  Harlan Arthur MD as Referring Physician (Family Medicine)  Harlan Traore II, MD as Consulting Physician (Hematology and Oncology)    Social History     Tobacco Use   • Smoking status: Never Smoker   • Smokeless tobacco: Never Used   Substance Use Topics   • Alcohol use: Yes     Comment: occ/caffeine use       Review of Systems   Constitutional: Negative for fatigue.   Cardiovascular: Negative for chest pain.           Objective     /70   Pulse 78   Temp 97.6 °F (36.4 °C) (Oral)   Resp 16   Ht 158 cm (62.21\")   Wt 64.9 kg (143 lb)   BMI 25.98 kg/m²     Body mass index is 25.98 kg/m².    Physical Exam   Constitutional: She is oriented to person, place, and time. She appears well-developed. No distress.   Eyes: Conjunctivae and lids are normal.   Neck: Carotid bruit is not present.   Cardiovascular: Normal rate, regular rhythm and normal heart sounds.   Pulmonary/Chest: Effort normal and breath sounds normal.   Neurological: She is alert and oriented to person, place, and time.   Skin: Skin is warm and dry.   Psychiatric: She has a normal mood and affect. Her behavior is normal.   Vitals reviewed.      Data Reviewed:         CMP:  Lab " Results   Component Value Date     (H) 08/09/2018    BUN 26 (H) 08/09/2018    CREATININE 1.06 (H) 08/09/2018    EGFRIFNONA 49 (L) 08/09/2018    EGFRIFAFRI 59 (L) 08/09/2018     08/09/2018    K 4.0 08/09/2018     08/09/2018    CALCIUM 9.9 08/09/2018    PROTENTOTREF 7.1 08/09/2018    ALBUMIN 4.20 08/09/2018    LABGLOBREF 2.9 08/09/2018    BILITOT 1.0 08/09/2018    ALKPHOS 63 08/09/2018    AST 14 08/09/2018    ALT 13 08/09/2018     CBC w/ diff:   Lab Results   Component Value Date    WBC 6.97 11/12/2018    RBC 3.90 11/12/2018    HGB 12.8 11/12/2018    HCT 38.0 11/12/2018    MCV 97.4 (H) 11/12/2018    MCH 32.8 11/12/2018    MCHC 33.7 11/12/2018    RDW 15.3 (H) 11/12/2018     11/12/2018    NEUTRORELPCT 65.8 11/12/2018    AUTOIGPER 0.4 11/12/2018    LYMPHORELPCT 20.4 11/12/2018    MONORELPCT 8.9 11/12/2018    EOSRELPCT 3.9 11/12/2018    BASORELPCT 0.6 11/12/2018     LIPID PANEL:  Lab Results   Component Value Date    CHLPL 116 08/09/2018    TRIG 88 08/09/2018    HDL 47 08/09/2018    VLDL 17.6 08/09/2018    LDL 51 08/09/2018    LDLHDL 1.09 08/09/2018     TSH:  Lab Results   Component Value Date    TSH 4.400 (H) 08/09/2018     HGBA1C (LAST 3):  Lab Results   Component Value Date    HGBA1C 6.70 (H) 08/09/2018    HGBA1C 6.78 (H) 05/02/2018    HGBA1C 7.40 (H) 11/20/2017     MICROALBUMIN SPOT URINE:  Lab Results   Component Value Date    MICROALBUR <3.0 08/09/2018       Assessment/Plan     Problem List Items Addressed This Visit     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   Continue current treatment regimen.  Diabetes will be reassessed in 6 months.         Relevant Medications    metFORMIN (GLUCOPHAGE) 500 MG tablet    Other Relevant Orders    Hemoglobin A1c    MicroAlbumin, Urine, Random - Urine, Clean Catch    Essential hypertension - Primary     Hypertension is unchanged.  Continue current  treatment regimen.  Blood pressure will be reassessed at the next regular appointment.         Relevant Medications    irbesartan (AVAPRO) 300 MG tablet    Other Relevant Orders    Comprehensive Metabolic Panel    CBC & Differential    Osteoporosis     The current medical regimen is effective;  continue present plan and medications.           Relevant Medications    alendronate (FOSAMAX) 70 MG tablet    Other Relevant Orders    DEXA Bone Density Axial    Chronic gout of multiple sites     Check labs in 6 months         Relevant Orders    Uric Acid    Abrasion of right lower extremity     Referral to wound clinic         Relevant Orders    Ambulatory Referral to Wound Clinic    Hyperlipidemia    Relevant Medications    atorvastatin (LIPITOR) 20 MG tablet    Other Relevant Orders    Lipid Panel With / Chol / HDL Ratio      Other Visit Diagnoses     Immunization due        Relevant Orders    Flu Vaccine High Dose PF 65YR+ ([id number]) (Completed)    Type 2 diabetes mellitus without complication, without long-term current use of insulin (CMS/[facility])        Relevant Medications    metFORMIN (GLUCOPHAGE) 500 MG tablet          Orders Placed This Encounter   Procedures   • DEXA Bone Density Axial     Standing Status:   Future     Order Specific Question:   Reason for Exam:     Answer:   screen for osteoporosis, menopausal   • Flu Vaccine High Dose PF 65YR+ ([id number])   • Comprehensive Metabolic Panel     Standing Status:   Future     Standing Expiration Date:   8/11/2019   • Lipid Panel With / Chol / HDL Ratio     Standing Status:   Future     Standing Expiration Date:   8/11/2019   • Hemoglobin A1c     Standing Status:   Future     Standing Expiration Date:   8/11/2019   • MicroAlbumin, Urine, Random - Urine, Clean Catch     Standing Status:   Future     Standing Expiration Date:   8/11/2019   • Uric Acid     Standing Status:   Future     Standing Expiration Date:   8/11/2019   • Ambulatory Referral to Wound Clinic      Referral Priority:   Routine     Referral Type:   Consultation     Referral Reason:   Specialty Services Required     Requested Specialty:   Wound Care     Number of Visits Requested:   1   • CBC & Differential     Standing Status:   Future     Standing Expiration Date:   8/11/2019     Order Specific Question:   Manual Differential     Answer:   No         Current Outpatient Medications:   •  aspirin 81 MG tablet, Take by mouth daily., Disp: , Rfl:   •  digoxin (LANOXIN) 125 MCG tablet, TAKE ONE TABLET BY MOUTH ONCE DAILY, Disp: 30 tablet, Rfl: 11  •  furosemide (LASIX) 40 MG tablet, TAKE ONE AND ONE-HALF TABLETS BY MOUTH ONCE DAILY, Disp: 135 tablet, Rfl: 3  •  hydrALAZINE (APRESOLINE) 25 MG tablet, TAKE ONE TABLET BY MOUTH THREE TIMES DAILY, Disp: 90 tablet, Rfl: 11  •  KLOR-CON 20 MEQ CR tablet, TAKE TWO TABLETS BY MOUTH ONCE DAILY (Patient taking differently: TAKE 1 TABLET BY MOUTH ONCE DAILY), Disp: 60 tablet, Rfl: 11  •  metoprolol tartrate (LOPRESSOR) 50 MG tablet, TAKE ONE TABLET BY MOUTH TWICE DAILY, Disp: 180 tablet, Rfl: 0  •  vitamin B-12 (CYANOCOBALAMIN) 1000 MCG tablet, Take 1,000 mcg by mouth Daily., Disp: , Rfl:   •  alendronate (FOSAMAX) 70 MG tablet, Take 1 tablet by mouth Every 7 (Seven) Days for 180 days., Disp: 4 tablet, Rfl: 5  •  atorvastatin (LIPITOR) 20 MG tablet, Take 1 tablet by mouth Daily for 180 days., Disp: 30 tablet, Rfl: 5  •  irbesartan (AVAPRO) 300 MG tablet, Take 1 tablet by mouth Every Night for 180 days., Disp: 30 tablet, Rfl: 5  •  metFORMIN (GLUCOPHAGE) 500 MG tablet, Take 1 tablet by mouth 2 (Two) Times a Day With Meals for 180 days., Disp: 60 tablet, Rfl: 5  •  pioglitazone (ACTOS) 30 MG tablet, Take 1 tablet by mouth Daily for 180 days., Disp: 30 tablet, Rfl: 5    Return in about 6 months (around 5/14/2019) for Medicare Wellness and regular visit, 30 minutes.

## 2018-11-21 RX ORDER — HYDRALAZINE HYDROCHLORIDE 25 MG/1
TABLET, FILM COATED ORAL
Qty: 90 TABLET | Refills: 11 | Status: SHIPPED | OUTPATIENT
Start: 2018-11-21 | End: 2019-12-06 | Stop reason: HOSPADM

## 2018-11-27 RX ORDER — FUROSEMIDE 40 MG/1
TABLET ORAL
Qty: 135 TABLET | Refills: 3 | Status: SHIPPED | OUTPATIENT
Start: 2018-11-27 | End: 2019-09-29 | Stop reason: SDUPTHER

## 2018-12-12 ENCOUNTER — HOSPITAL ENCOUNTER (OUTPATIENT)
Dept: BONE DENSITY | Facility: HOSPITAL | Age: 83
Discharge: HOME | End: 2018-12-12
Attending: FAMILY MEDICINE | Admitting: FAMILY MEDICINE

## 2018-12-12 DIAGNOSIS — M81.0 AGE-RELATED OSTEOPOROSIS WITHOUT CURRENT PATHOLOGICAL FRACTURE: ICD-10-CM

## 2018-12-12 PROCEDURE — 77080 DXA BONE DENSITY AXIAL: CPT

## 2018-12-31 DIAGNOSIS — E11.3293 CONTROLLED TYPE 2 DIABETES MELLITUS WITH MILD NONPROLIFERATIVE RETINOPATHY OF BOTH EYES, WITHOUT LONG-TERM CURRENT USE OF INSULIN, MACULAR EDEMA PRESENCE UNSPECIFIED (HCC): ICD-10-CM

## 2018-12-31 RX ORDER — PIOGLITAZONEHYDROCHLORIDE 30 MG/1
TABLET ORAL
Qty: 90 TABLET | Refills: 1 | Status: SHIPPED | OUTPATIENT
Start: 2018-12-31 | End: 2019-05-15 | Stop reason: SDUPTHER

## 2019-03-04 ENCOUNTER — APPOINTMENT (OUTPATIENT)
Dept: ONCOLOGY | Facility: CLINIC | Age: 84
End: 2019-03-04

## 2019-03-04 ENCOUNTER — APPOINTMENT (OUTPATIENT)
Dept: LAB | Facility: HOSPITAL | Age: 84
End: 2019-03-04

## 2019-03-11 RX ORDER — METOPROLOL TARTRATE 50 MG/1
TABLET, FILM COATED ORAL
Qty: 180 TABLET | Refills: 1 | Status: SHIPPED | OUTPATIENT
Start: 2019-03-11 | End: 2019-09-04 | Stop reason: SDUPTHER

## 2019-03-18 ENCOUNTER — CLINICAL SUPPORT NO REQUIREMENTS (OUTPATIENT)
Dept: CARDIOLOGY | Facility: CLINIC | Age: 84
End: 2019-03-18

## 2019-03-18 DIAGNOSIS — I48.20 CHRONIC ATRIAL FIBRILLATION (HCC): Primary | ICD-10-CM

## 2019-03-18 PROCEDURE — 93279 PRGRMG DEV EVAL PM/LDLS PM: CPT | Performed by: INTERNAL MEDICINE

## 2019-04-13 ENCOUNTER — RESULTS ENCOUNTER (OUTPATIENT)
Dept: FAMILY MEDICINE CLINIC | Facility: CLINIC | Age: 84
End: 2019-04-13

## 2019-04-13 DIAGNOSIS — M1A.09X0 IDIOPATHIC CHRONIC GOUT OF MULTIPLE SITES WITHOUT TOPHUS: ICD-10-CM

## 2019-04-13 DIAGNOSIS — E78.49 OTHER HYPERLIPIDEMIA: ICD-10-CM

## 2019-04-13 DIAGNOSIS — I10 ESSENTIAL HYPERTENSION: ICD-10-CM

## 2019-04-13 DIAGNOSIS — E11.3293 CONTROLLED TYPE 2 DIABETES MELLITUS WITH MILD NONPROLIFERATIVE RETINOPATHY OF BOTH EYES, WITHOUT LONG-TERM CURRENT USE OF INSULIN, MACULAR EDEMA PRESENCE UNSPECIFIED (HCC): ICD-10-CM

## 2019-04-26 DIAGNOSIS — I10 ESSENTIAL HYPERTENSION: ICD-10-CM

## 2019-04-29 RX ORDER — IRBESARTAN 300 MG/1
TABLET ORAL
Qty: 30 TABLET | Refills: 5 | OUTPATIENT
Start: 2019-04-29

## 2019-05-02 DIAGNOSIS — I10 ESSENTIAL HYPERTENSION: ICD-10-CM

## 2019-05-02 RX ORDER — IRBESARTAN 300 MG/1
TABLET ORAL
Qty: 30 TABLET | Refills: 5 | OUTPATIENT
Start: 2019-05-02

## 2019-05-06 DIAGNOSIS — I10 ESSENTIAL HYPERTENSION: ICD-10-CM

## 2019-05-06 RX ORDER — IRBESARTAN 300 MG/1
TABLET ORAL
Qty: 30 TABLET | Refills: 0 | Status: SHIPPED | OUTPATIENT
Start: 2019-05-06 | End: 2019-05-15 | Stop reason: SDUPTHER

## 2019-05-09 LAB
ALBUMIN SERPL-MCNC: 4.5 G/DL (ref 3.5–5.2)
ALBUMIN/GLOB SERPL: 1.5 G/DL
ALP SERPL-CCNC: 81 U/L (ref 39–117)
ALT SERPL-CCNC: 16 U/L (ref 1–33)
AST SERPL-CCNC: 16 U/L (ref 1–32)
BASOPHILS # BLD AUTO: 0.03 10*3/MM3 (ref 0–0.2)
BASOPHILS NFR BLD AUTO: 0.5 % (ref 0–1.5)
BILIRUB SERPL-MCNC: 1 MG/DL (ref 0.2–1.2)
BUN SERPL-MCNC: 26 MG/DL (ref 8–23)
BUN/CREAT SERPL: 26 (ref 7–25)
CALCIUM SERPL-MCNC: 9.8 MG/DL (ref 8.6–10.5)
CHLORIDE SERPL-SCNC: 98 MMOL/L (ref 98–107)
CHOLEST SERPL-MCNC: 136 MG/DL (ref 0–200)
CHOLEST/HDLC SERPL: 2.78 {RATIO}
CO2 SERPL-SCNC: 25.6 MMOL/L (ref 22–29)
CREAT SERPL-MCNC: 1 MG/DL (ref 0.57–1)
EOSINOPHIL # BLD AUTO: 0.26 10*3/MM3 (ref 0–0.4)
EOSINOPHIL NFR BLD AUTO: 4.2 % (ref 0.3–6.2)
ERYTHROCYTE [DISTWIDTH] IN BLOOD BY AUTOMATED COUNT: 15.7 % (ref 12.3–15.4)
GLOBULIN SER CALC-MCNC: 3.1 GM/DL
GLUCOSE SERPL-MCNC: 222 MG/DL (ref 65–99)
HBA1C MFR BLD: 6.8 % (ref 4.8–5.6)
HCT VFR BLD AUTO: 37 % (ref 34–46.6)
HDLC SERPL-MCNC: 49 MG/DL (ref 40–60)
HGB BLD-MCNC: 11.4 G/DL (ref 12–15.9)
IMM GRANULOCYTES # BLD AUTO: 0.02 10*3/MM3 (ref 0–0.05)
IMM GRANULOCYTES NFR BLD AUTO: 0.3 % (ref 0–0.5)
LDLC SERPL CALC-MCNC: 60 MG/DL (ref 0–100)
LYMPHOCYTES # BLD AUTO: 1.22 10*3/MM3 (ref 0.7–3.1)
LYMPHOCYTES NFR BLD AUTO: 19.7 % (ref 19.6–45.3)
MCH RBC QN AUTO: 31.8 PG (ref 26.6–33)
MCHC RBC AUTO-ENTMCNC: 30.8 G/DL (ref 31.5–35.7)
MCV RBC AUTO: 103.4 FL (ref 79–97)
MICROALBUMIN UR-MCNC: 21.8 UG/ML
MONOCYTES # BLD AUTO: 0.4 10*3/MM3 (ref 0.1–0.9)
MONOCYTES NFR BLD AUTO: 6.5 % (ref 5–12)
NEUTROPHILS # BLD AUTO: 4.25 10*3/MM3 (ref 1.7–7)
NEUTROPHILS NFR BLD AUTO: 68.8 % (ref 42.7–76)
NRBC BLD AUTO-RTO: 0 /100 WBC (ref 0–0.2)
PLATELET # BLD AUTO: 174 10*3/MM3 (ref 140–450)
POTASSIUM SERPL-SCNC: 3.6 MMOL/L (ref 3.5–5.2)
PROT SERPL-MCNC: 7.6 G/DL (ref 6–8.5)
RBC # BLD AUTO: 3.58 10*6/MM3 (ref 3.77–5.28)
SODIUM SERPL-SCNC: 137 MMOL/L (ref 136–145)
TRIGL SERPL-MCNC: 136 MG/DL (ref 0–150)
URATE SERPL-MCNC: 8 MG/DL (ref 2.4–5.7)
VLDLC SERPL CALC-MCNC: 27.2 MG/DL
WBC # BLD AUTO: 6.18 10*3/MM3 (ref 3.4–10.8)

## 2019-05-15 ENCOUNTER — OFFICE VISIT (OUTPATIENT)
Dept: FAMILY MEDICINE CLINIC | Facility: CLINIC | Age: 84
End: 2019-05-15

## 2019-05-15 VITALS
SYSTOLIC BLOOD PRESSURE: 128 MMHG | WEIGHT: 143 LBS | HEIGHT: 62 IN | RESPIRATION RATE: 16 BRPM | HEART RATE: 66 BPM | BODY MASS INDEX: 26.31 KG/M2 | OXYGEN SATURATION: 99 % | TEMPERATURE: 97.6 F | DIASTOLIC BLOOD PRESSURE: 72 MMHG

## 2019-05-15 DIAGNOSIS — E78.49 OTHER HYPERLIPIDEMIA: ICD-10-CM

## 2019-05-15 DIAGNOSIS — Z00.00 MEDICARE ANNUAL WELLNESS VISIT, SUBSEQUENT: Primary | ICD-10-CM

## 2019-05-15 DIAGNOSIS — E11.3393 TYPE 2 DIABETES MELLITUS WITH BOTH EYES AFFECTED BY MODERATE NONPROLIFERATIVE RETINOPATHY WITHOUT MACULAR EDEMA, WITHOUT LONG-TERM CURRENT USE OF INSULIN (HCC): ICD-10-CM

## 2019-05-15 DIAGNOSIS — M1A.09X0 IDIOPATHIC CHRONIC GOUT OF MULTIPLE SITES WITHOUT TOPHUS: ICD-10-CM

## 2019-05-15 DIAGNOSIS — M81.0 AGE-RELATED OSTEOPOROSIS WITHOUT CURRENT PATHOLOGICAL FRACTURE: ICD-10-CM

## 2019-05-15 DIAGNOSIS — I10 ESSENTIAL HYPERTENSION: ICD-10-CM

## 2019-05-15 DIAGNOSIS — N18.30 STAGE 3 CHRONIC KIDNEY DISEASE (HCC): ICD-10-CM

## 2019-05-15 PROCEDURE — 99214 OFFICE O/P EST MOD 30 MIN: CPT | Performed by: FAMILY MEDICINE

## 2019-05-15 PROCEDURE — G0439 PPPS, SUBSEQ VISIT: HCPCS | Performed by: FAMILY MEDICINE

## 2019-05-15 RX ORDER — ATORVASTATIN CALCIUM 20 MG/1
20 TABLET, FILM COATED ORAL DAILY
Qty: 90 TABLET | Refills: 1 | Status: SHIPPED | OUTPATIENT
Start: 2019-05-15 | End: 2019-11-26 | Stop reason: SDUPTHER

## 2019-05-15 RX ORDER — IRBESARTAN 300 MG/1
300 TABLET ORAL
Qty: 90 TABLET | Refills: 1 | Status: SHIPPED | OUTPATIENT
Start: 2019-05-15 | End: 2019-06-12 | Stop reason: SDUPTHER

## 2019-05-15 RX ORDER — ALENDRONATE SODIUM 70 MG/1
70 TABLET ORAL
Qty: 12 TABLET | Refills: 1 | Status: SHIPPED | OUTPATIENT
Start: 2019-05-15 | End: 2019-11-26 | Stop reason: SINTOL

## 2019-05-15 RX ORDER — PIOGLITAZONEHYDROCHLORIDE 30 MG/1
30 TABLET ORAL DAILY
Qty: 90 TABLET | Refills: 1 | Status: SHIPPED | OUTPATIENT
Start: 2019-05-15 | End: 2019-05-15

## 2019-05-15 RX ORDER — ALLOPURINOL 100 MG/1
100 TABLET ORAL DAILY
Qty: 90 TABLET | Refills: 1 | Status: SHIPPED | OUTPATIENT
Start: 2019-05-15 | End: 2019-11-26 | Stop reason: SDUPTHER

## 2019-05-15 RX ORDER — PIOGLITAZONEHYDROCHLORIDE 30 MG/1
30 TABLET ORAL DAILY
Qty: 90 TABLET | Refills: 1 | Status: SHIPPED | OUTPATIENT
Start: 2019-05-15 | End: 2019-11-26 | Stop reason: SDUPTHER

## 2019-05-15 NOTE — PROGRESS NOTES
Subsequent Medicare Wellness Visit   The ABC's of the Annual Wellness Visit    Chief Complaint   Patient presents with   • Medicare Wellness-subsequent       HPI:  Val Jeronimo, -1929, is a 89 y.o. female who presents for a Subsequent Medicare Wellness Visit.    Recent Hospitalizations:  No hospitalization(s) within the last year..    Current Medical Providers:  Patient Care Team:  Harlan Arthur MD as PCP - General  Harlan Arthur MD as PCP - Claims Attributed  Jair Sarkar OD as Consulting Physician (Ophthalmology)  Ananth Yoo MD as Consulting Physician (Cardiology)  Quinten Kruse DPM (Podiatry)  Harlan Arthur MD as Referring Physician (Family Medicine)  Harlan Traore II, MD as Consulting Physician (Hematology and Oncology)    Health Habits and Functional and Cognitive Screening and Depression Screening:  Functional & Cognitive Status 5/15/2019   Do you have difficulty preparing food and eating? No   Do you have difficulty bathing yourself, getting dressed or grooming yourself? No   Do you have difficulty using the toilet? No   Do you have difficulty moving around from place to place? No   Do you have trouble with steps or getting out of a bed or a chair? No   In the past year have you fallen or experienced a near fall? No   Current Diet Well Balanced Diet   Dental Exam Not up to date   Eye Exam Up to date   Exercise (times per week) 0 times per week   Current Exercise Activities Include None   Do you need help using the phone?  No   Are you deaf or do you have serious difficulty hearing?  No   Do you need help with transportation? No   Do you need help shopping? No   Do you need help preparing meals?  No   Do you need help with housework?  Yes   Do you need help with laundry? No   Do you need help taking your medications? No   Do you need help managing money? No   Do you ever drive or ride in a car without wearing a seat belt? No   Have you felt unusual stress, anger or  loneliness in the last month? No   Who do you live with? Spouse   If you need help, do you have trouble finding someone available to you? -   Have you been bothered in the last four weeks by sexual problems? No   Do you have difficulty concentrating, remembering or making decisions? No       Compared to one year ago, the patient feels her physical health is the same and her mental health is the same.    Depression Screen:  PHQ-2/PHQ-9 Depression Screening 5/15/2019   Little interest or pleasure in doing things 0   Feeling down, depressed, or hopeless 0   Trouble falling or staying asleep, or sleeping too much 0   Feeling tired or having little energy 1   Poor appetite or overeating 0   Feeling bad about yourself - or that you are a failure or have let yourself or your family down 0   Trouble concentrating on things, such as reading the newspaper or watching television 0   Moving or speaking so slowly that other people could have noticed. Or the opposite - being so fidgety or restless that you have been moving around a lot more than usual 0   Thoughts that you would be better off dead, or of hurting yourself in some way 0   Total Score 1   If you checked off any problems, how difficult have these problems made it for you to do your work, take care of things at home, or get along with other people? Not difficult at all         Past Medical/Family/Social History:  The following portions of the patient's history were reviewed and updated as appropriate: allergies, current medications, past family history, past medical history, past social history, past surgical history and problem list.    Aspirin use counseling: Taking ASA appropriately as indicated    Current medication list contains no high risk medications.  No harmful drug interactions have been identified.     Review of Systems    Objective     Vitals:    05/15/19 1035   BP: 128/72   Pulse: 66   Resp: 16   Temp: 97.6 °F (36.4 °C)   TempSrc: Oral   SpO2: 99%  "  Weight: 64.9 kg (143 lb)   Height: 158 cm (62.21\")   PainSc: 0-No pain       Patient's Body mass index is 25.98 kg/m². BMI is above normal parameters. Recommendations include: educational material.      The patient has no evidence of cognitve impairment.     Physical Exam    Recent Lab Results:  Lab Results   Component Value Date     (H) 05/08/2019     Lab Results   Component Value Date    TRIG 136 05/08/2019    HDL 49 05/08/2019    VLDL 27.2 05/08/2019    LDLHDL 1.09 08/09/2018       Assessment/Plan   Age-appropriate Screening Schedule:  Refer to the list below for future screening recommendations based on patient's age, sex and/or medical conditions.      Health Maintenance   Topic Date Due   • PNEUMOCOCCAL VACCINES (65+ LOW/MEDIUM RISK) (2 of 2 - PPSV23) 11/15/2018   • ZOSTER VACCINE (2 of 3) 06/28/2019 (Originally 1/19/2016)   • INFLUENZA VACCINE  08/01/2019   • HEMOGLOBIN A1C  11/08/2019   • LIPID PANEL  05/08/2020   • URINE MICROALBUMIN  05/08/2020   • DXA SCAN  12/12/2020   • TDAP/TD VACCINES (2 - Td) 10/27/2026   • MAMMOGRAM  Discontinued       Medicare Risks and Personalized Health Plan:  cardiovascular risk, inactivity, increased fall risk and caretaker stress      CMS-Preventive Services Quick Reference  Medicare Preventive Services Addressed:  Diabetes screening, see lab orders, Exercise counseling provided, Fall Risk assessment done, Fall Risk plan of care done, shingrix    Advance Care Planning:  Patient has an advance directive - a copy has been provided and is visible in patient header    Diagnoses and all orders for this visit:    1. Medicare annual wellness visit, subsequent (Primary)  Comments:  information on fall prevention, immunizations, diet, exercise shared with patient in AVS    2. Type 2 diabetes mellitus with both eyes affected by moderate nonproliferative retinopathy without macular edema, without long-term current use of insulin (CMS/[facility])  Assessment & Plan:  Diabetes is " unchanged.   Continue current treatment regimen.  Diabetes will be reassessed in 6 months.    Orders:  -     Hemoglobin A1c; Future  -     MicroAlbumin, Urine, Random - Urine, Clean Catch; Future  -     pioglitazone (ACTOS) 30 MG tablet; Take 1 tablet by mouth Daily for 180 days.  Dispense: 90 tablet; Refill: 1  -     metFORMIN (GLUCOPHAGE) 500 MG tablet; Take 1 tablet by mouth 2 (Two) Times a Day With Meals for 180 days.  Dispense: 180 tablet; Refill: 1    3. Essential hypertension  Assessment & Plan:  Hypertension is unchanged.  Continue current treatment regimen.  Blood pressure will be reassessed at the next regular appointment.    Orders:  -     irbesartan (AVAPRO) 300 MG tablet; Take 1 tablet by mouth every night at bedtime for 180 days.  Dispense: 90 tablet; Refill: 1  -     Comprehensive Metabolic Panel; Future  -     CBC & Differential; Future  -     TSH; Future    4. Idiopathic chronic gout of multiple sites without tophus  Assessment & Plan:  The current medical regimen is effective;  continue present plan and medications.      Orders:  -     allopurinol (ZYLOPRIM) 100 MG tablet; Take 1 tablet by mouth Daily for 180 days.  Dispense: 90 tablet; Refill: 1    5. Stage 3 chronic kidney disease (CMS/HCC)  Assessment & Plan:  Renal condition is improving with treatment.  Continue current treatment regimen.  Renal condition will be reassessed in 6 months.      6. Other hyperlipidemia  Assessment & Plan:  Lipid abnormalities are unchanged.  Pharmacotherapy as ordered.  Lipids will be reassessed in 6 months.    Orders:  -     atorvastatin (LIPITOR) 20 MG tablet; Take 1 tablet by mouth Daily for 180 days.  Dispense: 90 tablet; Refill: 1  -     Lipid Panel With / Chol / HDL Ratio; Future  -     CK; Future    7. Age-related osteoporosis without current pathological fracture  Assessment & Plan:  The current medical regimen is effective;  continue present plan and medications.      Orders:  -     alendronate (FOSAMAX)  70 MG tablet; Take 1 tablet by mouth Every 7 (Seven) Days for 180 days.  Dispense: 12 tablet; Refill: 1    Other orders  -     Discontinue: pioglitazone (ACTOS) 30 MG tablet; Take 1 tablet by mouth Daily for 180 days.  Dispense: 90 tablet; Refill: 1  -     Discontinue: metFORMIN (GLUCOPHAGE) 500 MG tablet; Take 1 tablet by mouth 2 (Two) Times a Day With Meals for 180 days.  Dispense: 180 tablet; Refill: 1  -     Discontinue: metFORMIN (GLUCOPHAGE) 500 MG tablet; Take 1 tablet by mouth 2 (Two) Times a Day With Meals for 180 days.  Dispense: 180 tablet; Refill: 1      An After Visit Summary and PPPS with all of these plans were given to the patient.      Follow Up:  Return in about 6 months (around 11/15/2019) for Recheck.

## 2019-05-15 NOTE — PATIENT INSTRUCTIONS
Check on insurance coverage and cost for Shingrix (newest shingles vaccine) and get the immunization at your local pharmacy. It is more effective than the old Zostavax vaccine and is recommended even if you have had the Zostavax vaccine in the past. For more information, please look at the website below:    https://www.cdc.gov/vaccines/vpd/shingles/public/shingrix/index.html    Annual flu shot has been recommended to patient. Optimal timing of this vaccination is in mid October of each year.     Medicare Wellness  Personal Prevention Plan of Service     Date of Office Visit:  05/15/2019  Encounter Provider:  Harlan Arthur MD  Place of Service:  [facility]  Patient Name: Val Jeronimo  :  1929    As part of the Medicare Wellness portion of your visit today, we are providing you with this personalized preventive plan of services (PPPS). This plan is based upon recommendations of the [place] Preventive Services Task Force (USPSTF) and the Advisory Committee on Immunization Practices (ACIP).    This lists the preventive care services that should be considered, and provides dates of when you are due. Items listed as completed are up-to-date and do not require any further intervention.    Health Maintenance   Topic Date Due   • PNEUMOCOCCAL VACCINES (65+ LOW/MEDIUM RISK) (2 of 2 - PPSV23) 11/15/2018   • MEDICARE ANNUAL WELLNESS  2019   • ZOSTER VACCINE (2 of 3) 2019 (Originally 2016)   • INFLUENZA VACCINE  2019   • HEMOGLOBIN A1C  2019   • LIPID PANEL  2020   • URINE MICROALBUMIN  2020   • DXA SCAN  2020   • TDAP/TD VACCINES (2 - Td) 10/27/2026   • MAMMOGRAM  Discontinued       Orders Placed This Encounter   Procedures   • Comprehensive Metabolic Panel     Standing Status:   Future     Standing Expiration Date:   2020   • Lipid Panel With / Chol / HDL Ratio     Standing Status:   Future     Standing Expiration Date:    2/9/2020   • CK     Standing Status:   Future     Standing Expiration Date:   2/9/2020   • TSH     Standing Status:   Future     Standing Expiration Date:   2/9/2020   • Hemoglobin A1c     Standing Status:   Future     Standing Expiration Date:   2/9/2020   • MicroAlbumin, Urine, Random - Urine, Clean Catch     Standing Status:   Future     Standing Expiration Date:   2/9/2020   • CBC & Differential     Standing Status:   Future     Standing Expiration Date:   2/9/2020     Order Specific Question:   Manual Differential     Answer:   No       Return in about 6 months (around 11/15/2019) for Recheck.          Fall Prevention in the Home, Adult  Falls can cause injuries and can affect people from all age groups. There are many simple things that you can do to make your home safe and to help prevent falls. Ask for help when making these changes, if needed.  What actions can I take to prevent falls?  General instructions  · Use good lighting in all rooms. Replace any light bulbs that burn out.  · Turn on lights if it is dark. Use night-lights.  · Place frequently used items in easy-to-reach places. Lower the shelves around your home if necessary.  · Set up furniture so that there are clear paths around it. Avoid moving your furniture around.  · Remove throw rugs and other tripping hazards from the floor.  · Avoid walking on wet floors.  · Fix any uneven floor surfaces.  · Add color or contrast paint or tape to grab bars and handrails in your home. Place contrasting color strips on the first and last steps of stairways.  · When you use a stepladder, make sure that it is completely opened and that the sides are firmly locked. Have someone hold the ladder while you are using it. Do not climb a closed stepladder.  · Be aware of any and all pets.  What can I do in the bathroom?  · Keep the floor dry. Immediately clean up any water that spills onto the floor.  · Remove soap buildup in the tub or shower on a regular  basis.  · Use non-skid mats or decals on the floor of the tub or shower.  · Attach bath mats securely with double-sided, non-slip rug tape.  · If you need to sit down while you are in the shower, use a plastic, non-slip stool.  · Install grab bars by the toilet and in the tub and shower. Do not use towel bars as grab bars.  What can I do in the bedroom?  · Make sure that a bedside light is easy to reach.  · Do not use oversized bedding that drapes onto the floor.  · Have a firm chair that has side arms to use for getting dressed.  What can I do in the kitchen?  · Clean up any spills right away.  · If you need to reach for something above you, use a sturdy step stool that has a grab bar.  · Keep electrical cables out of the way.  · Do not use floor polish or wax that makes floors slippery. If you must use wax, make sure that it is non-skid floor wax.  What can I do in the stairways?  · Do not leave any items on the stairs.  · Make sure that you have a light switch at the top of the stairs and the bottom of the stairs. Have them installed if you do not have them.  · Make sure that there are handrails on both sides of the stairs. Fix handrails that are broken or loose. Make sure that handrails are as long as the stairways.  · Install non-slip stair treads on all stairs in your home.  · Avoid having throw rugs at the top or bottom of stairways, or secure the rugs with carpet tape to prevent them from moving.  · Choose a carpet design that does not hide the edge of steps on the stairway.  · Check any carpeting to make sure that it is firmly attached to the stairs. Fix any carpet that is loose or worn.  What can I do on the outside of my home?  · Use bright outdoor lighting.  · Regularly repair the edges of walkways and driveways and fix any cracks.  · Remove high doorway thresholds.  · Trim any shrubbery on the main path into your home.  · Regularly check that handrails are securely fastened and in good repair. Both  sides of any steps should have handrails.  · Install guardrails along the edges of any raised decks or porches.  · Clear walkways of debris and clutter, including tools and rocks.  · Have leaves, snow, and ice cleared regularly.  · Use sand or salt on walkways during winter months.  · In the garage, clean up any spills right away, including grease or oil spills.  What other actions can I take?  · Wear closed-toe shoes that fit well and support your feet. Wear shoes that have rubber soles or low heels.  · Use mobility aids as needed, such as canes, walkers, scooters, and crutches.  · Review your medicines with your health care provider. Some medicines can cause dizziness or changes in blood pressure, which increase your risk of falling.  Talk with your health care provider about other ways that you can decrease your risk of falls. This may include working with a physical therapist or  to improve your strength, balance, and endurance.  Where to find more information  · Centers for Disease Control and Prevention, [employer]AD[employer]: https://www.cdc.gov  · National [employer] Aging: https://ew5wuyd.lorena.nih.gov  Contact a health care provider if:  · You are afraid of falling at home.  · You feel weak, drowsy, or dizzy at home.  · You fall at home.  Summary  · There are many simple things that you can do to make your home safe and to help prevent falls.  · Ways to make your home safe include removing tripping hazards and installing grab bars in the bathroom.  · Ask for help when making these changes in your home.  This information is not intended to replace advice given to you by your health care provider. Make sure you discuss any questions you have with your health care provider.  Document Released: 12/08/2003 Document Revised: 08/02/2018 Document Reviewed: 08/02/2018  [employer] Interactive Patient Education © 2019 [employer]      Exercising to Stay Healthy  Exercising regularly is important. It has many health benefits,  such as:  · Improving your overall fitness, flexibility, and endurance.  · Increasing your bone density.  · Helping with weight control.  · Decreasing your body fat.  · Increasing your muscle strength.  · Reducing stress and tension.  · Improving your overall health.    In order to become healthy and stay healthy, it is recommended that you do moderate-intensity and vigorous-intensity exercise. You can tell that you are exercising at a moderate intensity if you have a higher heart rate and faster breathing, but you are still able to hold a conversation. You can tell that you are exercising at a vigorous intensity if you are breathing much harder and faster and cannot hold a conversation while exercising.  How often should I exercise?  Choose an activity that you enjoy and set realistic goals. Your health care provider can help you to make an activity plan that works for you. Exercise regularly as directed by your health care provider. This may include:  · Doing resistance training twice each week, such as:  ? Push-ups.  ? Sit-ups.  ? Lifting weights.  ? Using resistance bands.  · Doing a given intensity of exercise for a given amount of time. Choose from these options:  ? 150 minutes of moderate-intensity exercise every week.  ? 75 minutes of vigorous-intensity exercise every week.  ? A mix of moderate-intensity and vigorous-intensity exercise every week.    Children, pregnant women, people who are out of shape, people who are overweight, and older adults may need to consult a health care provider for individual recommendations. If you have any sort of medical condition, be sure to consult your health care provider before starting a new exercise program.  What are some exercise ideas?  Some moderate-intensity exercise ideas include:  · Walking at a rate of 1 mile in 15 minutes.  · Biking.  · Hiking.  · Golfing.  · Dancing.    Some vigorous-intensity exercise ideas include:  · Walking at a rate of at least 4.5 miles  per hour.  · Jogging or running at a rate of 5 miles per hour.  · Biking at a rate of at least 10 miles per hour.  · Lap swimming.  · Roller-skating or in-line skating.  · Cross-country skiing.  · Vigorous competitive sports, such as football, basketball, and soccer.  · Jumping rope.  · Aerobic dancing.    What are some everyday activities that can help me to get exercise?  · Yard work, such as:  ? Pushing a .  ? Raking and bagging leaves.  · Washing and waxing your car.  · Pushing a stroller.  · Shoveling snow.  · Gardening.  · Washing windows or floors.  How can I be more active in my day-to-day activities?  · Use the stairs instead of the elevator.  · Take a walk during your lunch break.  · If you drive, park your car farther away from work or school.  · If you take public transportation, get off one stop early and walk the rest of the way.  · Make all of your phone calls while standing up and walking around.  · Get up, stretch, and walk around every 30 minutes throughout the day.  What guidelines should I follow while exercising?  · Do not exercise so much that you hurt yourself, feel dizzy, or get very short of breath.  · Consult your health care provider before starting a new exercise program.  · Wear comfortable clothes and shoes with good support.  · Drink plenty of water while you exercise to prevent dehydration or heat stroke. Body water is lost during exercise and must be replaced.  · Work out until you breathe faster and your heart beats faster.  This information is not intended to replace advice given to you by your health care provider. Make sure you discuss any questions you have with your health care provider.  Document Released: 01/20/2012 Document Revised: 05/25/2017 Document Reviewed: 05/21/2015  [employer] Interactive Patient Education © 2018 [employer]

## 2019-05-22 ENCOUNTER — OFFICE VISIT (OUTPATIENT)
Dept: ONCOLOGY | Facility: CLINIC | Age: 84
End: 2019-05-22

## 2019-05-22 ENCOUNTER — LAB (OUTPATIENT)
Dept: LAB | Facility: HOSPITAL | Age: 84
End: 2019-05-22

## 2019-05-22 VITALS
WEIGHT: 139.9 LBS | HEART RATE: 75 BPM | BODY MASS INDEX: 25.75 KG/M2 | SYSTOLIC BLOOD PRESSURE: 102 MMHG | HEIGHT: 62 IN | OXYGEN SATURATION: 98 % | DIASTOLIC BLOOD PRESSURE: 69 MMHG | RESPIRATION RATE: 16 BRPM | TEMPERATURE: 98.3 F

## 2019-05-22 DIAGNOSIS — D64.9 ANEMIA, UNSPECIFIED TYPE: Primary | ICD-10-CM

## 2019-05-22 DIAGNOSIS — D64.9 ANEMIA, UNSPECIFIED TYPE: ICD-10-CM

## 2019-05-22 LAB
BASOPHILS # BLD AUTO: 0.03 10*3/MM3 (ref 0–0.2)
BASOPHILS NFR BLD AUTO: 0.5 % (ref 0–1.5)
DEPRECATED RDW RBC AUTO: 57.8 FL (ref 37–54)
EOSINOPHIL # BLD AUTO: 0.22 10*3/MM3 (ref 0–0.4)
EOSINOPHIL NFR BLD AUTO: 3.3 % (ref 0.3–6.2)
ERYTHROCYTE [DISTWIDTH] IN BLOOD BY AUTOMATED COUNT: 15.7 % (ref 12.3–15.4)
HCT VFR BLD AUTO: 35.2 % (ref 34–46.6)
HGB BLD-MCNC: 11.5 G/DL (ref 12–15.9)
IMM GRANULOCYTES # BLD AUTO: 0.02 10*3/MM3 (ref 0–0.05)
IMM GRANULOCYTES NFR BLD AUTO: 0.3 % (ref 0–0.5)
LYMPHOCYTES # BLD AUTO: 1.02 10*3/MM3 (ref 0.7–3.1)
LYMPHOCYTES NFR BLD AUTO: 15.5 % (ref 19.6–45.3)
MCH RBC QN AUTO: 32.7 PG (ref 26.6–33)
MCHC RBC AUTO-ENTMCNC: 32.7 G/DL (ref 31.5–35.7)
MCV RBC AUTO: 100 FL (ref 79–97)
MONOCYTES # BLD AUTO: 0.43 10*3/MM3 (ref 0.1–0.9)
MONOCYTES NFR BLD AUTO: 6.5 % (ref 5–12)
NEUTROPHILS # BLD AUTO: 4.88 10*3/MM3 (ref 1.7–7)
NEUTROPHILS NFR BLD AUTO: 73.9 % (ref 42.7–76)
NRBC BLD AUTO-RTO: 0 /100 WBC (ref 0–0.2)
PLATELET # BLD AUTO: 184 10*3/MM3 (ref 140–450)
PMV BLD AUTO: 9.6 FL (ref 6–12)
RBC # BLD AUTO: 3.52 10*6/MM3 (ref 3.77–5.28)
VIT B12 BLD-MCNC: >2000 PG/ML (ref 211–946)
WBC NRBC COR # BLD: 6.6 10*3/MM3 (ref 3.4–10.8)

## 2019-05-22 PROCEDURE — 85025 COMPLETE CBC W/AUTO DIFF WBC: CPT | Performed by: INTERNAL MEDICINE

## 2019-05-22 PROCEDURE — 99213 OFFICE O/P EST LOW 20 MIN: CPT | Performed by: NURSE PRACTITIONER

## 2019-05-22 PROCEDURE — 82607 VITAMIN B-12: CPT | Performed by: INTERNAL MEDICINE

## 2019-05-22 PROCEDURE — 36415 COLL VENOUS BLD VENIPUNCTURE: CPT | Performed by: INTERNAL MEDICINE

## 2019-05-22 NOTE — PROGRESS NOTES
.     REASON FOR FOLLOWUP :   Macrocytosis, anemia    HISTORY OF PRESENT ILLNESS:  The patient is a 89 y.o. year old female mentioned history here today for routine follow-up.  She continues on oral B12 500 mcg daily.    She denies any new problems or concerns.  She denies any worsening fatigue, bleeding issues, chest pain, shortness of breath, or dizzy spells.  She feels like she has been doing fairly well since her last visit.    Past Medical History:   Diagnosis Date   • Arthritis    • Atrial fibrillation (CMS/HCC)    • CHF (congestive heart failure) (CMS/HCC)    • Degeneration of thoracic intervertebral disc    • Diabetes mellitus (CMS/HCC)    • Gout    • H/O Pulmonary nodule 10/2013   • History of anemia 10/2013   • History of cellulitis 2016    Finger of left hand   • Hyperlipidemia    • Hypertension    • Odontoid fracture (CMS/HCC)    • Osteoporosis    • Pacemaker      Past Surgical History:   Procedure Laterality Date   • NECK SURGERY  10/2013    Broken neck repair   • PACEMAKER IMPLANTATION         HEMATOLOGIC/ONCOLOGIC HISTORY:  (History from previous dates can be found in the separate document.)    MEDICATIONS    Current Outpatient Medications:   •  alendronate (FOSAMAX) 70 MG tablet, Take 1 tablet by mouth Every 7 (Seven) Days for 180 days., Disp: 12 tablet, Rfl: 1  •  allopurinol (ZYLOPRIM) 100 MG tablet, Take 1 tablet by mouth Daily for 180 days., Disp: 90 tablet, Rfl: 1  •  aspirin 81 MG tablet, Take by mouth daily., Disp: , Rfl:   •  atorvastatin (LIPITOR) 20 MG tablet, Take 1 tablet by mouth Daily for 180 days., Disp: 90 tablet, Rfl: 1  •  digoxin (LANOXIN) 125 MCG tablet, TAKE ONE TABLET BY MOUTH ONCE DAILY, Disp: 30 tablet, Rfl: 11  •  furosemide (LASIX) 40 MG tablet, TAKE ONE & ONE-HALF TABLETS BY MOUTH ONCE DAILY, Disp: 135 tablet, Rfl: 3  •  hydrALAZINE (APRESOLINE) 25 MG tablet, TAKE ONE TABLET BY MOUTH THREE TIMES DAILY, Disp: 90 tablet, Rfl: 11  •  irbesartan (AVAPRO) 300 MG tablet, Take  1 tablet by mouth every night at bedtime for 180 days., Disp: 90 tablet, Rfl: 1  •  KLOR-CON 20 MEQ CR tablet, TAKE TWO TABLETS BY MOUTH ONCE DAILY (Patient taking differently: TAKE 1 TABLET BY MOUTH ONCE DAILY), Disp: 60 tablet, Rfl: 11  •  metFORMIN (GLUCOPHAGE) 500 MG tablet, Take 1 tablet by mouth 2 (Two) Times a Day With Meals for 180 days., Disp: 180 tablet, Rfl: 1  •  metoprolol tartrate (LOPRESSOR) 50 MG tablet, TAKE 1 TABLET BY MOUTH TWICE DAILY, Disp: 180 tablet, Rfl: 1  •  pioglitazone (ACTOS) 30 MG tablet, Take 1 tablet by mouth Daily for 180 days., Disp: 90 tablet, Rfl: 1  •  vitamin B-12 (CYANOCOBALAMIN) 1000 MCG tablet, Take 1,000 mcg by mouth Daily., Disp: , Rfl:     ALLERGIES:     Allergies   Allergen Reactions   • Advil [Ibuprofen]        SOCIAL HISTORY:       Social History     Socioeconomic History   • Marital status:      Spouse name: Noah   • Number of children: 5   • Years of education: High School   • Highest education level: Not on file   Occupational History   • Occupation: Homemaker     Employer: RETIRED   Tobacco Use   • Smoking status: Never Smoker   • Smokeless tobacco: Never Used   Substance and Sexual Activity   • Alcohol use: Yes     Alcohol/week: 0.6 oz     Types: 1 Glasses of wine per week     Frequency: Monthly or less     Drinks per session: 1 or 2     Comment: occ/caffeine use   • Drug use: No   • Sexual activity: Defer   Lifestyle   • Physical activity:     Days per week: 0 days     Minutes per session: 0 min   • Stress: Not at all   Relationships   • Social connections:     Talks on phone: More than three times a week     Gets together: More than three times a week     Attends Confucianist service: 1 to 4 times per year     Active member of club or organization: Yes     Attends meetings of clubs or organizations: 1 to 4 times per year     Relationship status:          FAMILY HISTORY:  Family History   Problem Relation Age of Onset   • Heart disease Father    •  "Kidney cancer Sister 61        Renal cell carcinoma   • Leukemia Son 44        CML   • Colon cancer Daughter 64   • COPD Sister         non smoker       REVIEW OF SYSTEMS:  Review of Systems   Constitutional: Negative for activity change.   HENT: Negative for nosebleeds and trouble swallowing.    Respiratory: Negative for shortness of breath and wheezing.    Cardiovascular: Negative for chest pain and palpitations.   Gastrointestinal: Negative for constipation, diarrhea and nausea.   Genitourinary: Negative for dysuria and hematuria.   Musculoskeletal: Negative for arthralgias and myalgias.   Skin: Negative for rash and wound.   Neurological: Negative for seizures and syncope.   Hematological: Negative for adenopathy. Does not bruise/bleed easily.   Psychiatric/Behavioral: Negative for confusion.     5/22/2019 review of systems unchanged from above.         Vitals:    05/22/19 1150   BP: 102/69   Pulse: 75   Resp: 16   Temp: 98.3 °F (36.8 °C)   TempSrc: Oral   SpO2: 98%   Weight: 63.5 kg (139 lb 14.4 oz)   Height: 158 cm (62.21\")   PainSc: 0-No pain     Current Status 5/22/2019   ECOG score 0     Physical Exam   Constitutional: She is oriented to person, place, and time. She appears well-developed and well-nourished. No distress.   HENT:   Head: Normocephalic and atraumatic.   Mouth/Throat: Oropharynx is clear and moist and mucous membranes are normal. No oropharyngeal exudate.   Eyes: Pupils are equal, round, and reactive to light.   Neck: Normal range of motion.   Cardiovascular: Normal rate, regular rhythm and normal heart sounds.   No murmur heard.  Pulmonary/Chest: Effort normal and breath sounds normal. No respiratory distress. She has no wheezes. She has no rhonchi. She has no rales.   Abdominal: Soft. Normal appearance and bowel sounds are normal. She exhibits no distension. There is no hepatosplenomegaly.   Musculoskeletal: Normal range of motion. She exhibits no edema.   Neurological: She is alert and " oriented to person, place, and time.   Skin: Skin is warm and dry. No rash noted.   Psychiatric: She has a normal mood and affect.   Vitals reviewed.       RECENT LABS:        WBC   Date Value Ref Range Status   05/22/2019 6.60 3.40 - 10.80 10*3/mm3 Final   05/08/2019 6.18 3.40 - 10.80 10*3/mm3 Final   11/12/2018 6.97 4.00 - 10.00 10*3/mm3 Final   08/09/2018 5.24 4.50 - 10.70 10*3/mm3 Final   06/20/2018 7.78 4.00 - 10.00 10*3/mm3 Final   06/06/2018 6.76 4.00 - 10.00 10*3/mm3 Final   05/02/2018 5.59 4.50 - 10.70 10*3/mm3 Final   11/20/2017 6.56 4.50 - 10.70 10*3/mm3 Final   09/21/2017 6.73 4.50 - 10.70 10*3/mm3 Final   03/06/2017 7.08 4.50 - 10.70 10*3/mm3 Final   09/12/2016 5.86 4.50 - 10.70 10*3/mm3 Final   03/15/2016 6.8 3.4 - 10.8 x10E3/uL Final   04/28/2014 9.70 4.50 - 10.70 K/Cumm Final   01/29/2014 4.04 (L) 4.50 - 10.70 K/Cumm Final   01/28/2014 4.27 (L) 4.50 - 10.70 K/Cumm Final   01/26/2014 6.84 4.50 - 10.70 K/Cumm Final     Hemoglobin   Date Value Ref Range Status   05/22/2019 11.5 (L) 12.0 - 15.9 g/dL Final   05/08/2019 11.4 (L) 12.0 - 15.9 g/dL Final   11/12/2018 12.8 11.5 - 14.9 g/dL Final   08/09/2018 10.9 (L) 11.9 - 15.5 g/dL Final   06/20/2018 11.2 (L) 11.5 - 14.9 g/dL Final   06/06/2018 10.9 (L) 11.5 - 14.9 g/dL Final   05/02/2018 10.8 (L) 11.9 - 15.5 g/dL Final   11/20/2017 12.3 11.9 - 15.5 g/dL Final   09/21/2017 12.4 11.9 - 15.5 g/dL Final   03/06/2017 12.7 11.9 - 15.5 g/dL Final   09/12/2016 12.5 11.9 - 15.5 g/dL Final   03/15/2016 11.7 11.1 - 15.9 g/dL Final   04/28/2014 12.1 11.9 - 15.5 g/dL Final   01/29/2014 12.6 11.9 - 15.5 g/dL Final   01/28/2014 12.6 11.9 - 15.5 g/dL Final   01/26/2014 12.3 11.9 - 15.5 g/dL Final     Platelets   Date Value Ref Range Status   05/22/2019 184 140 - 450 10*3/mm3 Final   05/08/2019 174 140 - 450 10*3/mm3 Final   11/12/2018 221 150 - 375 10*3/mm3 Final   08/09/2018 187 140 - 500 10*3/mm3 Final   06/20/2018 216 150 - 375 10*3/mm3 Final   06/06/2018 189 150 - 375  10*3/mm3 Final   05/02/2018 250 140 - 500 10*3/mm3 Final   11/20/2017 226 140 - 500 10*3/mm3 Final   09/21/2017 227 140 - 500 10*3/mm3 Final   03/06/2017 268 140 - 500 10*3/mm3 Final   09/12/2016 221 140 - 500 10*3/mm3 Final   03/15/2016 220 150 - 379 x10E3/uL Final   04/28/2014 216 140 - 500 K/Cumm Final   01/29/2014 150 140 - 500 K/Cumm Final   01/28/2014 151 140 - 500 K/Cumm Final   01/26/2014 166 140 - 500 K/Cumm Final       Assessment/Plan   Anemia, unspecified type      *Anemia.  Hb 10.8 on 5/2/18.  Hb normal on 11/20/17 and on prior labs.  Unremarkable: SPEP, S CATRACHITO, haptoglobin, LDH, total bilirubin, cait.  Ferritin not robust at 40, but 20% iron sat and retic Hb high, not low.  Therefore, doubt this is iron deficiency.  Hb 11.5 today.    *b12 Borderline low, but normal at 269 with normal range 211-246 on 6/6/18.  (Oral B12 500 µg daily, started 6/20/18.)  B12 checked today greater than 2000.    *Creatinine was 1.24 on 6/6/18.  On 5/2/18 and on prior labs, creatinine mostly 1.  Creatinine repeated 6/20/18:  1.26.    *Hyperkalemia.  Mild.  [facility] nurses to call her and make sure she is no longer taking her potassium supplement.  If she is, stop it.  Defer to her PCP if her Diovan stopped and any further workup or treatment of the renal insufficiency.    *Macrocytosis.  Gradually worsening.  MCV improved from 101-106, down to 97 with oral B12.    *Pulmonary nodule, initially seen in 10/28/13.    CT April 2014 revealed some calcification, suggesting this was granulomatous disease.  Plan was to repeat one final CT around April 2015 and if stable know further follow-up CTs.  Patient did not return for follow-up.  At this point, if this were cancer, I would expect it to be apparent by now.  She is 88 years old.  Patient and daughter do not want any further follow-up of this.    PLAN:  Continue oral B12 500 mcg daily.  MD with CBC, B12 in 6 months (patient and daughter requested 6 months versus 4 months, as the  patient has quite a few doctor follow-up visits).  We discussed signs and symptoms for which the patient should call sooner, and they verbalized understanding.

## 2019-06-12 ENCOUNTER — TELEPHONE (OUTPATIENT)
Dept: FAMILY MEDICINE CLINIC | Facility: CLINIC | Age: 84
End: 2019-06-12

## 2019-06-12 DIAGNOSIS — I10 ESSENTIAL HYPERTENSION: ICD-10-CM

## 2019-06-12 RX ORDER — IRBESARTAN 300 MG/1
300 TABLET ORAL
Qty: 90 TABLET | Refills: 0 | Status: SHIPPED | OUTPATIENT
Start: 2019-06-12 | End: 2019-11-26 | Stop reason: SDUPTHER

## 2019-08-06 ENCOUNTER — CLINICAL SUPPORT (OUTPATIENT)
Dept: FAMILY MEDICINE CLINIC | Facility: CLINIC | Age: 84
End: 2019-08-06

## 2019-08-06 DIAGNOSIS — Z23 IMMUNIZATION DUE: Primary | ICD-10-CM

## 2019-08-06 PROCEDURE — 86580 TB INTRADERMAL TEST: CPT | Performed by: FAMILY MEDICINE

## 2019-08-08 ENCOUNTER — OFFICE VISIT (OUTPATIENT)
Dept: FAMILY MEDICINE CLINIC | Facility: CLINIC | Age: 84
End: 2019-08-08

## 2019-08-08 VITALS
WEIGHT: 139 LBS | TEMPERATURE: 98.5 F | BODY MASS INDEX: 25.58 KG/M2 | DIASTOLIC BLOOD PRESSURE: 67 MMHG | RESPIRATION RATE: 16 BRPM | HEIGHT: 62 IN | HEART RATE: 80 BPM | SYSTOLIC BLOOD PRESSURE: 122 MMHG | OXYGEN SATURATION: 97 %

## 2019-08-08 DIAGNOSIS — Z02.9 ADMINISTRATIVE ENCOUNTER: Primary | ICD-10-CM

## 2019-08-08 PROCEDURE — 99213 OFFICE O/P EST LOW 20 MIN: CPT | Performed by: FAMILY MEDICINE

## 2019-08-08 NOTE — PROGRESS NOTES
"Chief Complaint:   Subjective    Rooming Tab(CC,VS,Pt Hx,Fall Screen)   Chief Complaint   Patient presents with   • nursing home evaluation     paper work needed         History of Present Illness   Val Jeronimo is a 89 y.o. female who presents to the office today to get evaluation for possible placement in nursing home facility.  The form has been filled out during today's office visit and will be scanned into the chart at a later point.    I have reviewed and updated her medications, medical history and problem list during today's office visit.     Problem List Tab  Medications Tab  Synopsis Tab  Chart Review Tab  Care Everywhere Tab  Immunizations Tab  Patient History Tab  Social History     Tobacco Use   • Smoking status: Never Smoker   • Smokeless tobacco: Never Used   Substance Use Topics   • Alcohol use: Yes     Alcohol/week: 0.6 oz     Types: 1 Glasses of wine per week     Frequency: Monthly or less     Drinks per session: 1 or 2     Comment: occ/caffeine use       Review of Systems   Constitutional: Negative for fatigue.   Cardiovascular: Negative for chest pain.         Physical Examination:   Objective   Rooming Tab(CC,VS,Pt Hx,Fall Screen)  /67   Pulse 80   Temp 98.5 °F (36.9 °C) (Oral)   Resp 16   Ht 158 cm (62.21\")   Wt 63 kg (139 lb)   SpO2 97%   BMI 25.26 kg/m²     Body mass index is 25.26 kg/m².    Physical Exam   Constitutional: She is oriented to person, place, and time. She appears well-developed. No distress.   Eyes: Conjunctivae and lids are normal.   Neck: Carotid bruit is not present.   Cardiovascular: Normal rate and normal heart sounds. An irregularly irregular rhythm present.   Pulmonary/Chest: Effort normal and breath sounds normal.   Neurological: She is alert and oriented to person, place, and time.   Skin: Skin is warm and dry.   Psychiatric: She has a normal mood and affect. Her speech is normal and behavior is normal. Judgment normal. Cognition and memory are not " impaired. She exhibits normal recent memory.   Serial 7's intact, spells world forward and backward She is attentive.   Vitals reviewed.       Data Reviewed:              Lab Results   Component Value Date    WBC 6.60 05/22/2019    RBC 3.52 (L) 05/22/2019    HCT 35.2 05/22/2019    .0 (H) 05/22/2019    MCH 32.7 05/22/2019          Assessment/Plan:   Assessment/Plan   Order Review Tab  Health Maintenance Tab  Patient Plan/Order Tab  There are no diagnoses linked to this encounter.   Follow up:   Wrapup Tab  Return if symptoms worsen or fail to improve.

## 2019-08-13 ENCOUNTER — TELEPHONE (OUTPATIENT)
Dept: FAMILY MEDICINE CLINIC | Facility: CLINIC | Age: 84
End: 2019-08-13

## 2019-08-13 DIAGNOSIS — Z03.89 ENCOUNTER FOR OBSERVATION FOR OTHER SUSPECTED DISEASES AND CONDITIONS RULED OUT: Primary | ICD-10-CM

## 2019-08-13 PROCEDURE — 71046 X-RAY EXAM CHEST 2 VIEWS: CPT | Performed by: FAMILY MEDICINE

## 2019-09-04 RX ORDER — METOPROLOL TARTRATE 50 MG/1
TABLET, FILM COATED ORAL
Qty: 180 TABLET | Refills: 1 | Status: SHIPPED | OUTPATIENT
Start: 2019-09-04 | End: 2019-11-26 | Stop reason: SDUPTHER

## 2019-09-18 ENCOUNTER — CLINICAL SUPPORT NO REQUIREMENTS (OUTPATIENT)
Dept: CARDIOLOGY | Facility: CLINIC | Age: 84
End: 2019-09-18

## 2019-09-18 DIAGNOSIS — I48.20 CHRONIC ATRIAL FIBRILLATION (HCC): Primary | ICD-10-CM

## 2019-09-18 PROCEDURE — 93288 INTERROG EVL PM/LDLS PM IP: CPT | Performed by: INTERNAL MEDICINE

## 2019-10-01 RX ORDER — FUROSEMIDE 40 MG/1
TABLET ORAL
Qty: 135 TABLET | Refills: 3 | Status: SHIPPED | OUTPATIENT
Start: 2019-10-01 | End: 2019-12-06 | Stop reason: HOSPADM

## 2019-10-07 RX ORDER — DIGOXIN 125 MCG
TABLET ORAL
Qty: 90 TABLET | Refills: 3 | Status: SHIPPED | OUTPATIENT
Start: 2019-10-07 | End: 2020-11-06

## 2019-10-12 ENCOUNTER — RESULTS ENCOUNTER (OUTPATIENT)
Dept: FAMILY MEDICINE CLINIC | Facility: CLINIC | Age: 84
End: 2019-10-12

## 2019-10-12 DIAGNOSIS — E78.49 OTHER HYPERLIPIDEMIA: ICD-10-CM

## 2019-10-12 DIAGNOSIS — I10 ESSENTIAL HYPERTENSION: ICD-10-CM

## 2019-10-12 DIAGNOSIS — E11.3393 TYPE 2 DIABETES MELLITUS WITH BOTH EYES AFFECTED BY MODERATE NONPROLIFERATIVE RETINOPATHY WITHOUT MACULAR EDEMA, WITHOUT LONG-TERM CURRENT USE OF INSULIN (HCC): ICD-10-CM

## 2019-11-06 ENCOUNTER — OFFICE VISIT (OUTPATIENT)
Dept: ONCOLOGY | Facility: CLINIC | Age: 84
End: 2019-11-06

## 2019-11-06 ENCOUNTER — LAB (OUTPATIENT)
Dept: LAB | Facility: HOSPITAL | Age: 84
End: 2019-11-06

## 2019-11-06 VITALS
SYSTOLIC BLOOD PRESSURE: 117 MMHG | HEIGHT: 62 IN | OXYGEN SATURATION: 99 % | BODY MASS INDEX: 24.59 KG/M2 | DIASTOLIC BLOOD PRESSURE: 72 MMHG | RESPIRATION RATE: 16 BRPM | HEART RATE: 69 BPM | TEMPERATURE: 97.6 F | WEIGHT: 133.6 LBS

## 2019-11-06 DIAGNOSIS — D64.9 ANEMIA, UNSPECIFIED TYPE: Primary | ICD-10-CM

## 2019-11-06 LAB
BASOPHILS # BLD AUTO: 0.02 10*3/MM3 (ref 0–0.2)
BASOPHILS NFR BLD AUTO: 0.3 % (ref 0–1.5)
DEPRECATED RDW RBC AUTO: 60.1 FL (ref 37–54)
EOSINOPHIL # BLD AUTO: 0.22 10*3/MM3 (ref 0–0.4)
EOSINOPHIL NFR BLD AUTO: 3.8 % (ref 0.3–6.2)
ERYTHROCYTE [DISTWIDTH] IN BLOOD BY AUTOMATED COUNT: 15.5 % (ref 12.3–15.4)
HCT VFR BLD AUTO: 37.1 % (ref 34–46.6)
HGB BLD-MCNC: 11.6 G/DL (ref 12–15.9)
IMM GRANULOCYTES # BLD AUTO: 0.01 10*3/MM3 (ref 0–0.05)
IMM GRANULOCYTES NFR BLD AUTO: 0.2 % (ref 0–0.5)
LYMPHOCYTES # BLD AUTO: 1.04 10*3/MM3 (ref 0.7–3.1)
LYMPHOCYTES NFR BLD AUTO: 18.1 % (ref 19.6–45.3)
MCH RBC QN AUTO: 32.7 PG (ref 26.6–33)
MCHC RBC AUTO-ENTMCNC: 31.3 G/DL (ref 31.5–35.7)
MCV RBC AUTO: 104.5 FL (ref 79–97)
MONOCYTES # BLD AUTO: 0.43 10*3/MM3 (ref 0.1–0.9)
MONOCYTES NFR BLD AUTO: 7.5 % (ref 5–12)
NEUTROPHILS # BLD AUTO: 4.02 10*3/MM3 (ref 1.7–7)
NEUTROPHILS NFR BLD AUTO: 70.1 % (ref 42.7–76)
NRBC BLD AUTO-RTO: 0 /100 WBC (ref 0–0.2)
PLATELET # BLD AUTO: 197 10*3/MM3 (ref 140–450)
PMV BLD AUTO: 9.5 FL (ref 6–12)
RBC # BLD AUTO: 3.55 10*6/MM3 (ref 3.77–5.28)
VIT B12 BLD-MCNC: >2000 PG/ML (ref 211–946)
WBC NRBC COR # BLD: 5.74 10*3/MM3 (ref 3.4–10.8)

## 2019-11-06 PROCEDURE — 82607 VITAMIN B-12: CPT | Performed by: INTERNAL MEDICINE

## 2019-11-06 PROCEDURE — G0463 HOSPITAL OUTPT CLINIC VISIT: HCPCS | Performed by: INTERNAL MEDICINE

## 2019-11-06 PROCEDURE — 85025 COMPLETE CBC W/AUTO DIFF WBC: CPT

## 2019-11-06 PROCEDURE — 36415 COLL VENOUS BLD VENIPUNCTURE: CPT

## 2019-11-06 PROCEDURE — 99214 OFFICE O/P EST MOD 30 MIN: CPT | Performed by: INTERNAL MEDICINE

## 2019-11-06 NOTE — PROGRESS NOTES
.     REASON FOR FOLLOWUP :   Macrocytosis, anemia    HISTORY OF PRESENT ILLNESS:  The patient is a 89 y.o. year old female  who is here for follow-up with the above-mentioned history.    Moved into assisted living with her  a few weeks ago.  Her  is in memory care.  Her daughter is requesting less visits here if possible to try to minimize transportation and doctor's visits.    Denies bleeding, chest pain, shortness of breath, dizziness.    Past Medical History:   Diagnosis Date   • Arthritis    • Atrial fibrillation (CMS/HCC)    • CHF (congestive heart failure) (CMS/HCC)    • Degeneration of thoracic intervertebral disc    • Diabetes mellitus (CMS/HCC)    • Gout    • H/O Pulmonary nodule 10/2013   • History of anemia 10/2013   • History of cellulitis 2016    Finger of left hand   • Hyperlipidemia    • Hypertension    • Odontoid fracture (CMS/HCC)    • Osteoporosis    • Pacemaker      Past Surgical History:   Procedure Laterality Date   • NECK SURGERY  10/2013    Broken neck repair   • PACEMAKER IMPLANTATION         HEMATOLOGIC/ONCOLOGIC HISTORY:  (History from previous dates can be found in the separate document.)    MEDICATIONS    Current Outpatient Medications:   •  alendronate (FOSAMAX) 70 MG tablet, Take 1 tablet by mouth Every 7 (Seven) Days for 180 days., Disp: 12 tablet, Rfl: 1  •  allopurinol (ZYLOPRIM) 100 MG tablet, Take 1 tablet by mouth Daily for 180 days., Disp: 90 tablet, Rfl: 1  •  aspirin 81 MG tablet, Take by mouth daily., Disp: , Rfl:   •  atorvastatin (LIPITOR) 20 MG tablet, Take 1 tablet by mouth Daily for 180 days., Disp: 90 tablet, Rfl: 1  •  digoxin (LANOXIN) 125 MCG tablet, TAKE 1 TABLET BY MOUTH ONCE DAILY, Disp: 90 tablet, Rfl: 3  •  furosemide (LASIX) 40 MG tablet, TAKE 1 & 1/2 (ONE & ONE-HALF) TABLETS BY MOUTH ONCE DAILY, Disp: 135 tablet, Rfl: 3  •  hydrALAZINE (APRESOLINE) 25 MG tablet, TAKE ONE TABLET BY MOUTH THREE TIMES DAILY, Disp: 90 tablet, Rfl: 11  •   irbesartan (AVAPRO) 300 MG tablet, Take 1 tablet by mouth every night at bedtime for 180 days., Disp: 90 tablet, Rfl: 0  •  metFORMIN (GLUCOPHAGE) 500 MG tablet, Take 1 tablet by mouth 2 (Two) Times a Day With Meals for 180 days., Disp: 180 tablet, Rfl: 1  •  metoprolol tartrate (LOPRESSOR) 50 MG tablet, TAKE 1 TABLET BY MOUTH TWICE DAILY, Disp: 180 tablet, Rfl: 1  •  pioglitazone (ACTOS) 30 MG tablet, Take 1 tablet by mouth Daily for 180 days., Disp: 90 tablet, Rfl: 1  •  vitamin B-12 (CYANOCOBALAMIN) 1000 MCG tablet, Take 1,000 mcg by mouth Daily., Disp: , Rfl:     ALLERGIES:     Allergies   Allergen Reactions   • Advil [Ibuprofen]        SOCIAL HISTORY:       Social History     Socioeconomic History   • Marital status:      Spouse name: Noah   • Number of children: 5   • Years of education: High School   • Highest education level: Not on file   Occupational History   • Occupation: Homemaker     Employer: RETIRED   Tobacco Use   • Smoking status: Never Smoker   • Smokeless tobacco: Never Used   Substance and Sexual Activity   • Alcohol use: Yes     Alcohol/week: 0.6 oz     Types: 1 Glasses of wine per week     Frequency: Monthly or less     Drinks per session: 1 or 2     Comment: occ/caffeine use   • Drug use: No   • Sexual activity: Defer   Lifestyle   • Physical activity:     Days per week: 0 days     Minutes per session: 0 min   • Stress: Not at all   Relationships   • Social connections:     Talks on phone: More than three times a week     Gets together: More than three times a week     Attends Gnosticist service: 1 to 4 times per year     Active member of club or organization: Yes     Attends meetings of clubs or organizations: 1 to 4 times per year     Relationship status:          FAMILY HISTORY:  Family History   Problem Relation Age of Onset   • Heart disease Father    • Kidney cancer Sister 61        Renal cell carcinoma   • Leukemia Son 44        CML   • Colon cancer Daughter 64   • COPD  "Sister         non smoker       REVIEW OF SYSTEMS:  Review of Systems   Constitutional: Negative for activity change.   HENT: Negative for nosebleeds and trouble swallowing.    Respiratory: Negative for shortness of breath and wheezing.    Cardiovascular: Negative for chest pain and palpitations.   Gastrointestinal: Negative for constipation, diarrhea and nausea.   Genitourinary: Negative for dysuria and hematuria.   Musculoskeletal: Negative for arthralgias and myalgias.   Skin: Negative for rash and wound.   Neurological: Negative for seizures and syncope.   Hematological: Negative for adenopathy. Does not bruise/bleed easily.   Psychiatric/Behavioral: Negative for confusion.              Vitals:    11/06/19 1111   BP: 117/72   Pulse: 69   Resp: 16   Temp: 97.6 °F (36.4 °C)   SpO2: 99%   Weight: 60.6 kg (133 lb 9.6 oz)   Height: 158 cm (62.21\")   PainSc: 0-No pain     Current Status 11/6/2019   ECOG score 1      PHYSICAL EXAM:    CONSTITUTIONAL:  Vital signs reviewed.  No distress, looks comfortable.  EYES:  Conjunctiva and lids unremarkable.  PERRLA  EARS,NOSE,MOUTH,THROAT:  Ears and nose appear unremarkable.  Lips, teeth, gums appear unremarkable.  RESPIRATORY:  Normal respiratory effort.  Lungs clear to auscultation bilaterally.  CARDIOVASCULAR:  Normal S1, S2.  No murmurs rubs or gallops.  No significant lower extremity edema.  GASTROINTESTINAL: Abdomen appears unremarkable.  Nontender.  No hepatomegaly.  No splenomegaly.  LYMPHATIC:  No cervical, supraclavicular, axillary lymphadenopathy.  SKIN:  Warm.  No rashes.  PSYCHIATRIC:  Normal judgment and insight.  Normal mood and affect.    RECENT LABS:        WBC   Date Value Ref Range Status   11/06/2019 5.74 3.40 - 10.80 10*3/mm3 Final   05/22/2019 6.60 3.40 - 10.80 10*3/mm3 Final   05/08/2019 6.18 3.40 - 10.80 10*3/mm3 Final   11/12/2018 6.97 4.00 - 10.00 10*3/mm3 Final   08/09/2018 5.24 4.50 - 10.70 10*3/mm3 Final   06/20/2018 7.78 4.00 - 10.00 10*3/mm3 Final "   06/06/2018 6.76 4.00 - 10.00 10*3/mm3 Final   05/02/2018 5.59 4.50 - 10.70 10*3/mm3 Final   11/20/2017 6.56 4.50 - 10.70 10*3/mm3 Final   09/21/2017 6.73 4.50 - 10.70 10*3/mm3 Final   03/06/2017 7.08 4.50 - 10.70 10*3/mm3 Final   09/12/2016 5.86 4.50 - 10.70 10*3/mm3 Final   03/15/2016 6.8 3.4 - 10.8 x10E3/uL Final   04/28/2014 9.70 4.50 - 10.70 K/Cumm Final   01/29/2014 4.04 (L) 4.50 - 10.70 K/Cumm Final   01/28/2014 4.27 (L) 4.50 - 10.70 K/Cumm Final   01/26/2014 6.84 4.50 - 10.70 K/Cumm Final     Hemoglobin   Date Value Ref Range Status   11/06/2019 11.6 (L) 12.0 - 15.9 g/dL Final   05/22/2019 11.5 (L) 12.0 - 15.9 g/dL Final   05/08/2019 11.4 (L) 12.0 - 15.9 g/dL Final   11/12/2018 12.8 11.5 - 14.9 g/dL Final   08/09/2018 10.9 (L) 11.9 - 15.5 g/dL Final   06/20/2018 11.2 (L) 11.5 - 14.9 g/dL Final   06/06/2018 10.9 (L) 11.5 - 14.9 g/dL Final   05/02/2018 10.8 (L) 11.9 - 15.5 g/dL Final   11/20/2017 12.3 11.9 - 15.5 g/dL Final   09/21/2017 12.4 11.9 - 15.5 g/dL Final   03/06/2017 12.7 11.9 - 15.5 g/dL Final   09/12/2016 12.5 11.9 - 15.5 g/dL Final   03/15/2016 11.7 11.1 - 15.9 g/dL Final   04/28/2014 12.1 11.9 - 15.5 g/dL Final   01/29/2014 12.6 11.9 - 15.5 g/dL Final   01/28/2014 12.6 11.9 - 15.5 g/dL Final   01/26/2014 12.3 11.9 - 15.5 g/dL Final     Platelets   Date Value Ref Range Status   11/06/2019 197 140 - 450 10*3/mm3 Final   05/22/2019 184 140 - 450 10*3/mm3 Final   05/08/2019 174 140 - 450 10*3/mm3 Final   11/12/2018 221 150 - 375 10*3/mm3 Final   08/09/2018 187 140 - 500 10*3/mm3 Final   06/20/2018 216 150 - 375 10*3/mm3 Final   06/06/2018 189 150 - 375 10*3/mm3 Final   05/02/2018 250 140 - 500 10*3/mm3 Final   11/20/2017 226 140 - 500 10*3/mm3 Final   09/21/2017 227 140 - 500 10*3/mm3 Final   03/06/2017 268 140 - 500 10*3/mm3 Final   09/12/2016 221 140 - 500 10*3/mm3 Final   03/15/2016 220 150 - 379 x10E3/uL Final   04/28/2014 216 140 - 500 K/Cumm Final   01/29/2014 150 140 - 500 K/Cumm Final    01/28/2014 151 140 - 500 K/Cumm Final   01/26/2014 166 140 - 500 K/Cumm Final       Assessment/Plan   Anemia, unspecified type  - CBC & Differential  - Vitamin B12      *Anemia.  Hb 10.8 on 5/2/18.  Hb normal on 11/20/17 and on prior labs.  Unremarkable: SPEP, S CATRACHITO, haptoglobin, LDH, total bilirubin, cait.  Ferritin not robust at 40, but 20% iron sat and retic Hb high, not low.  Therefore, doubt this is iron deficiency.  Hb 11.6, near normal    *b12 Borderline low, but normal at 269 with normal range 211-246 on 6/6/18.  · Oral B12 500 µg daily, started 6/20/18.  Await B12 level from today    *Creatinine was 1.24 on 6/6/18.  On 5/2/18 and on prior labs, creatinine mostly 1.  Creatinine repeated 6/20/18:  1.26.  Creatinine on 5/8/2019, 1.    *Hyperkalemia.  Mild.  [facility] nurses to call her and make sure she is no longer taking her potassium supplement.  If she is, stop it.  Defer to her PCP if her Diovan stopped and any further workup or treatment of the renal insufficiency.  Potassium 3.6 on 5/8/2019    *Macrocytosis.  Gradually worsening.  MCV improved from 101-106, down to 97 with oral B12.  .5    *Pulmonary nodule, initially seen in 10/28/13.    CT April 2014 revealed some calcification, suggesting this was granulomatous disease.  Plan was to repeat one final CT around April 2015 and if stable know further follow-up CTs.  Patient did not return for follow-up.  At this point, if this were cancer, I would expect it to be apparent by now.  She is 88 years old.  Patient and daughter do not want any further follow-up of this.    Plan  · Continue oral 500 µg B12, daily.  · M.D. CBC B12 level 1 year (patient and daughter would like his few visits is possible to try and minimize doctor's visits)    Daughter assisted with history.

## 2019-11-14 ENCOUNTER — OFFICE VISIT (OUTPATIENT)
Dept: CARDIOLOGY | Facility: CLINIC | Age: 84
End: 2019-11-14

## 2019-11-14 VITALS
BODY MASS INDEX: 23.92 KG/M2 | DIASTOLIC BLOOD PRESSURE: 64 MMHG | HEIGHT: 63 IN | WEIGHT: 135 LBS | HEART RATE: 68 BPM | OXYGEN SATURATION: 98 % | SYSTOLIC BLOOD PRESSURE: 106 MMHG

## 2019-11-14 DIAGNOSIS — I10 ESSENTIAL HYPERTENSION: ICD-10-CM

## 2019-11-14 DIAGNOSIS — I48.19 PERSISTENT ATRIAL FIBRILLATION (HCC): Primary | ICD-10-CM

## 2019-11-14 PROCEDURE — 99213 OFFICE O/P EST LOW 20 MIN: CPT | Performed by: INTERNAL MEDICINE

## 2019-11-14 NOTE — PROGRESS NOTES
Subjective:     Encounter Date:11/14/19        Patient ID: Val Jeronimo is a 89 y.o. female.    Chief Complaint:  Atrial Fibrillation   Presents for follow-up visit. Symptoms are negative for palpitations and tachycardia. The symptoms have been stable. Past medical history includes atrial fibrillation.   Hypertension   This is a chronic problem. The problem is controlled. Pertinent negatives include no palpitations.       89-year-old female who presents today for reevaluation.  Likely patient is doing well.  No chest pain shortness of breath palpitations or lightheadedness.  She did have to place her  in a memory care facility obviously has been a huge burden on her.  Overall she is doing good her pacemaker was checked had no issues.    Review of Systems   Cardiovascular: Negative for palpitations.   Skin: Positive for poor wound healing.   All other systems reviewed and are negative.      Procedures         Objective:     Physical Exam   Constitutional: She is oriented to person, place, and time. She appears well-developed.   HENT:   Head: Normocephalic.   Eyes: Conjunctivae are normal.   Neck: Normal range of motion.   Cardiovascular: Normal rate, regular rhythm and normal heart sounds.   Pulmonary/Chest: Breath sounds normal.   Abdominal: Soft. Bowel sounds are normal.   Musculoskeletal: Normal range of motion. She exhibits no edema.   Neurological: She is alert and oriented to person, place, and time.   Skin: Skin is warm and dry.   Psychiatric: She has a normal mood and affect. Her behavior is normal.   Vitals reviewed.      Lab Review:       Assessment:          Diagnosis Plan   1. Persistent atrial fibrillation     2. Essential hypertension            Plan:          1. History of chronic atrial fibrillation. Heart rate looks good.  2. History of sick sinus syndrome status post pacemaker.  3.  Hypertension blood pressures excellent   4.  Continue same follow-up in 1 year or sooner if she develops  issues    Atrial Fibrillation and Atrial Flutter  Assessment  • The patient has permanent atrial fibrillation  • This is non-valvular in etiology  • The patient's CHADS2-VASc score is 4  • A [id number] score of 2 or more is considered a high risk for a thromboembolic event  • Warfarin not prescribed for medical reasons    Plan  • Continue in atrial fibrillation with rate control  • Continue aspirin for antithrombotic therapy, bleeding issues discussed  • Continue beta blocker for rate control

## 2019-11-26 ENCOUNTER — OFFICE VISIT (OUTPATIENT)
Dept: FAMILY MEDICINE CLINIC | Facility: CLINIC | Age: 84
End: 2019-11-26

## 2019-11-26 VITALS
OXYGEN SATURATION: 99 % | HEIGHT: 63 IN | SYSTOLIC BLOOD PRESSURE: 134 MMHG | WEIGHT: 134 LBS | RESPIRATION RATE: 16 BRPM | DIASTOLIC BLOOD PRESSURE: 69 MMHG | BODY MASS INDEX: 23.74 KG/M2 | HEART RATE: 66 BPM

## 2019-11-26 DIAGNOSIS — E78.49 OTHER HYPERLIPIDEMIA: ICD-10-CM

## 2019-11-26 DIAGNOSIS — I10 ESSENTIAL HYPERTENSION: Primary | ICD-10-CM

## 2019-11-26 DIAGNOSIS — M1A.09X0 IDIOPATHIC CHRONIC GOUT OF MULTIPLE SITES WITHOUT TOPHUS: ICD-10-CM

## 2019-11-26 DIAGNOSIS — N18.30 STAGE 3 CHRONIC KIDNEY DISEASE (HCC): ICD-10-CM

## 2019-11-26 DIAGNOSIS — E11.3393 TYPE 2 DIABETES MELLITUS WITH BOTH EYES AFFECTED BY MODERATE NONPROLIFERATIVE RETINOPATHY WITHOUT MACULAR EDEMA, WITHOUT LONG-TERM CURRENT USE OF INSULIN (HCC): ICD-10-CM

## 2019-11-26 PROCEDURE — 99214 OFFICE O/P EST MOD 30 MIN: CPT | Performed by: FAMILY MEDICINE

## 2019-11-26 RX ORDER — ATORVASTATIN CALCIUM 20 MG/1
20 TABLET, FILM COATED ORAL DAILY
Qty: 90 TABLET | Refills: 1 | Status: SHIPPED | OUTPATIENT
Start: 2019-11-26 | End: 2020-06-01 | Stop reason: SDUPTHER

## 2019-11-26 RX ORDER — PIOGLITAZONEHYDROCHLORIDE 30 MG/1
30 TABLET ORAL DAILY
Qty: 90 TABLET | Refills: 1 | Status: SHIPPED | OUTPATIENT
Start: 2019-11-26 | End: 2019-12-06 | Stop reason: HOSPADM

## 2019-11-26 RX ORDER — METOPROLOL TARTRATE 50 MG/1
50 TABLET, FILM COATED ORAL 2 TIMES DAILY
Qty: 180 TABLET | Refills: 1 | Status: SHIPPED | OUTPATIENT
Start: 2019-11-26 | End: 2020-06-01 | Stop reason: SDUPTHER

## 2019-11-26 RX ORDER — ALLOPURINOL 100 MG/1
100 TABLET ORAL DAILY
Qty: 90 TABLET | Refills: 1 | Status: SHIPPED | OUTPATIENT
Start: 2019-11-26 | End: 2020-06-01 | Stop reason: SDUPTHER

## 2019-11-26 RX ORDER — IRBESARTAN 300 MG/1
300 TABLET ORAL
Qty: 90 TABLET | Refills: 1 | Status: SHIPPED | OUTPATIENT
Start: 2019-11-26 | End: 2019-12-06 | Stop reason: HOSPADM

## 2019-11-26 NOTE — ASSESSMENT & PLAN NOTE
Renal condition is unchanged. Pending lab results  Continue current treatment regimen.  Renal condition will be reassessed in 6 months.

## 2019-11-26 NOTE — PROGRESS NOTES
"   Subjective       Chief Complaint   Patient presents with   • Hypertension     6 mo follow up    • Hyperlipidemia         History of Present Illness   Val Jeronimo is a 89 y.o. female who presents to the office today to refill medicines.  Labs are due.  She will get those labs at her living facility and they will be forwarded to us for review.  Since last visit she stopped taking alendronate because of GI distress.  We will switch to Prolia and start that therapy plan.  No recent gout attacks.  Type 2 diabetes stable pending lab results.  Blood pressure controlled.  Lipids stable pending lab results.  Recently she had cyst removed from her face and is healing.  Overall she feels well.    I have reviewed and updated her medications, medical history and problem list during today's office visit.     Social History     Tobacco Use   • Smoking status: Never Smoker   • Smokeless tobacco: Never Used   Substance Use Topics   • Alcohol use: Yes     Alcohol/week: 0.6 oz     Types: 1 Glasses of wine per week     Frequency: Monthly or less     Drinks per session: 1 or 2     Comment: occ/caffeine use       Review of Systems   Constitutional: Negative for fatigue.   Cardiovascular: Negative for chest pain.         Physical Examination:   Objective   /69   Pulse 66   Resp 16   Ht 160 cm (63\")   Wt 60.8 kg (134 lb)   SpO2 99%   BMI 23.74 kg/m²     Body mass index is 23.74 kg/m².    Physical Exam   Constitutional: She is oriented to person, place, and time. She appears well-developed. No distress.   Eyes: Conjunctivae and lids are normal.   Neck: Carotid bruit is not present.   Cardiovascular: Normal rate and normal heart sounds. An irregularly irregular rhythm present.   Pulmonary/Chest: Effort normal and breath sounds normal.   Neurological: She is alert and oriented to person, place, and time.   Skin: Skin is warm and dry.   Psychiatric: She has a normal mood and affect. Her behavior is normal.   Vitals " reviewed.       Data Reviewed:              Lab Results   Component Value Date    WBC 5.74 11/06/2019    RBC 3.55 (L) 11/06/2019    HCT 37.1 11/06/2019    .5 (H) 11/06/2019    MCH 32.7 11/06/2019          Assessment/Plan:   Assessment/Plan   Diagnoses and all orders for this visit:    1. Essential hypertension (Primary)  Assessment & Plan:  Hypertension is unchanged.  Continue current treatment regimen.  Blood pressure will be reassessed at the next regular appointment.    Orders:  -     metoprolol tartrate (LOPRESSOR) 50 MG tablet; Take 1 tablet by mouth 2 (Two) Times a Day for 180 days.  Dispense: 180 tablet; Refill: 1  -     irbesartan (AVAPRO) 300 MG tablet; Take 1 tablet by mouth every night at bedtime for 180 days.  Dispense: 90 tablet; Refill: 1    2. Type 2 diabetes mellitus with both eyes affected by moderate nonproliferative retinopathy without macular edema, without long-term current use of insulin (CMS/[facility])  Assessment & Plan:  Diabetes is unchanged.   Continue current treatment regimen.  Diabetes will be reassessed in 6 months.    Orders:  -     pioglitazone (ACTOS) 30 MG tablet; Take 1 tablet by mouth Daily for 180 days.  Dispense: 90 tablet; Refill: 1  -     metFORMIN (GLUCOPHAGE) 500 MG tablet; Take 1 tablet by mouth 2 (Two) Times a Day With Meals for 180 days.  Dispense: 180 tablet; Refill: 1    3. Other hyperlipidemia  -     atorvastatin (LIPITOR) 20 MG tablet; Take 1 tablet by mouth Daily for 180 days.  Dispense: 90 tablet; Refill: 1    4. Idiopathic chronic gout of multiple sites without tophus  Assessment & Plan:  The current medical regimen is effective;  continue present plan and medications.      Orders:  -     allopurinol (ZYLOPRIM) 100 MG tablet; Take 1 tablet by mouth Daily for 180 days.  Dispense: 90 tablet; Refill: 1    5. Stage 3 chronic kidney disease (CMS/[facility])  Assessment & Plan:  Renal condition is unchanged. Pending lab results  Continue current treatment regimen.  Renal  condition will be reassessed in 6 months.      Other orders  -     denosumab (PROLIA) syringe 60 mg    Patient Instructions         Follow up:   Return in about 6 months (around 5/26/2020).

## 2019-12-03 ENCOUNTER — APPOINTMENT (OUTPATIENT)
Dept: GENERAL RADIOLOGY | Facility: HOSPITAL | Age: 84
End: 2019-12-03

## 2019-12-03 ENCOUNTER — APPOINTMENT (OUTPATIENT)
Dept: CT IMAGING | Facility: HOSPITAL | Age: 84
End: 2019-12-03

## 2019-12-03 ENCOUNTER — HOSPITAL ENCOUNTER (INPATIENT)
Facility: HOSPITAL | Age: 84
LOS: 3 days | Discharge: NURSING, REHAB OR LONG-TERM CARE | End: 2019-12-06
Attending: EMERGENCY MEDICINE | Admitting: HOSPITALIST

## 2019-12-03 DIAGNOSIS — S12.501A CLOSED NONDISPLACED FRACTURE OF SIXTH CERVICAL VERTEBRA, UNSPECIFIED FRACTURE MORPHOLOGY, INITIAL ENCOUNTER (HCC): Primary | ICD-10-CM

## 2019-12-03 DIAGNOSIS — S62.646A CLOSED NONDISPLACED FRACTURE OF PROXIMAL PHALANX OF RIGHT LITTLE FINGER, INITIAL ENCOUNTER: ICD-10-CM

## 2019-12-03 DIAGNOSIS — S02.2XXA CLOSED FRACTURE OF NASAL BONE, INITIAL ENCOUNTER: ICD-10-CM

## 2019-12-03 DIAGNOSIS — S62.644A CLOSED NONDISPLACED FRACTURE OF PROXIMAL PHALANX OF RIGHT RING FINGER, INITIAL ENCOUNTER: ICD-10-CM

## 2019-12-03 LAB
ANION GAP SERPL CALCULATED.3IONS-SCNC: 13.5 MMOL/L (ref 5–15)
BASOPHILS # BLD AUTO: 0.02 10*3/MM3 (ref 0–0.2)
BASOPHILS NFR BLD AUTO: 0.3 % (ref 0–1.5)
BUN BLD-MCNC: 20 MG/DL (ref 8–23)
BUN/CREAT SERPL: 22.7 (ref 7–25)
CALCIUM SPEC-SCNC: 8.9 MG/DL (ref 8.2–9.6)
CHLORIDE SERPL-SCNC: 101 MMOL/L (ref 98–107)
CO2 SERPL-SCNC: 22.5 MMOL/L (ref 22–29)
CREAT BLD-MCNC: 0.88 MG/DL (ref 0.57–1)
DEPRECATED RDW RBC AUTO: 52.1 FL (ref 37–54)
DIGOXIN SERPL-MCNC: 1.5 NG/ML (ref 0.6–1.2)
EOSINOPHIL # BLD AUTO: 0.14 10*3/MM3 (ref 0–0.4)
EOSINOPHIL NFR BLD AUTO: 1.9 % (ref 0.3–6.2)
ERYTHROCYTE [DISTWIDTH] IN BLOOD BY AUTOMATED COUNT: 14.4 % (ref 12.3–15.4)
GFR SERPL CREATININE-BSD FRML MDRD: 60 ML/MIN/1.73
GLUCOSE BLD-MCNC: 131 MG/DL (ref 65–99)
GLUCOSE BLDC GLUCOMTR-MCNC: 134 MG/DL (ref 70–130)
GLUCOSE BLDC GLUCOMTR-MCNC: 135 MG/DL (ref 70–130)
GLUCOSE BLDC GLUCOMTR-MCNC: 246 MG/DL (ref 70–130)
HCT VFR BLD AUTO: 29.8 % (ref 34–46.6)
HGB BLD-MCNC: 10.2 G/DL (ref 12–15.9)
IMM GRANULOCYTES # BLD AUTO: 0.02 10*3/MM3 (ref 0–0.05)
IMM GRANULOCYTES NFR BLD AUTO: 0.3 % (ref 0–0.5)
LYMPHOCYTES # BLD AUTO: 0.79 10*3/MM3 (ref 0.7–3.1)
LYMPHOCYTES NFR BLD AUTO: 10.9 % (ref 19.6–45.3)
MCH RBC QN AUTO: 34.1 PG (ref 26.6–33)
MCHC RBC AUTO-ENTMCNC: 34.2 G/DL (ref 31.5–35.7)
MCV RBC AUTO: 99.7 FL (ref 79–97)
MONOCYTES # BLD AUTO: 0.49 10*3/MM3 (ref 0.1–0.9)
MONOCYTES NFR BLD AUTO: 6.8 % (ref 5–12)
NEUTROPHILS # BLD AUTO: 5.76 10*3/MM3 (ref 1.7–7)
NEUTROPHILS NFR BLD AUTO: 79.8 % (ref 42.7–76)
NRBC BLD AUTO-RTO: 0 /100 WBC (ref 0–0.2)
PLATELET # BLD AUTO: 196 10*3/MM3 (ref 140–450)
PMV BLD AUTO: 9.6 FL (ref 6–12)
POTASSIUM BLD-SCNC: 3.8 MMOL/L (ref 3.5–5.2)
RBC # BLD AUTO: 2.99 10*6/MM3 (ref 3.77–5.28)
SODIUM BLD-SCNC: 137 MMOL/L (ref 136–145)
WBC NRBC COR # BLD: 7.22 10*3/MM3 (ref 3.4–10.8)

## 2019-12-03 PROCEDURE — 70486 CT MAXILLOFACIAL W/O DYE: CPT

## 2019-12-03 PROCEDURE — 63710000001 INSULIN LISPRO (HUMAN) PER 5 UNITS: Performed by: NURSE PRACTITIONER

## 2019-12-03 PROCEDURE — 93010 ELECTROCARDIOGRAM REPORT: CPT | Performed by: INTERNAL MEDICINE

## 2019-12-03 PROCEDURE — 99285 EMERGENCY DEPT VISIT HI MDM: CPT

## 2019-12-03 PROCEDURE — 99221 1ST HOSP IP/OBS SF/LOW 40: CPT | Performed by: NURSE PRACTITIONER

## 2019-12-03 PROCEDURE — 85025 COMPLETE CBC W/AUTO DIFF WBC: CPT | Performed by: EMERGENCY MEDICINE

## 2019-12-03 PROCEDURE — 72125 CT NECK SPINE W/O DYE: CPT

## 2019-12-03 PROCEDURE — 63710000001 INSULIN GLARGINE PER 5 UNITS: Performed by: HOSPITALIST

## 2019-12-03 PROCEDURE — 80162 ASSAY OF DIGOXIN TOTAL: CPT | Performed by: EMERGENCY MEDICINE

## 2019-12-03 PROCEDURE — 73130 X-RAY EXAM OF HAND: CPT

## 2019-12-03 PROCEDURE — 93005 ELECTROCARDIOGRAM TRACING: CPT | Performed by: EMERGENCY MEDICINE

## 2019-12-03 PROCEDURE — 82962 GLUCOSE BLOOD TEST: CPT

## 2019-12-03 PROCEDURE — 70450 CT HEAD/BRAIN W/O DYE: CPT

## 2019-12-03 PROCEDURE — 80048 BASIC METABOLIC PNL TOTAL CA: CPT | Performed by: EMERGENCY MEDICINE

## 2019-12-03 RX ORDER — DEXTROSE MONOHYDRATE 25 G/50ML
25 INJECTION, SOLUTION INTRAVENOUS
Status: DISCONTINUED | OUTPATIENT
Start: 2019-12-03 | End: 2019-12-06 | Stop reason: HOSPADM

## 2019-12-03 RX ORDER — SODIUM CHLORIDE 0.9 % (FLUSH) 0.9 %
10 SYRINGE (ML) INJECTION AS NEEDED
Status: DISCONTINUED | OUTPATIENT
Start: 2019-12-03 | End: 2019-12-06 | Stop reason: HOSPADM

## 2019-12-03 RX ORDER — NICOTINE POLACRILEX 4 MG
15 LOZENGE BUCCAL
Status: DISCONTINUED | OUTPATIENT
Start: 2019-12-03 | End: 2019-12-06 | Stop reason: HOSPADM

## 2019-12-03 RX ORDER — POTASSIUM CHLORIDE 750 MG/1
20 CAPSULE, EXTENDED RELEASE ORAL DAILY
COMMUNITY
End: 2019-12-06 | Stop reason: HOSPADM

## 2019-12-03 RX ORDER — INSULIN GLARGINE 100 [IU]/ML
8 INJECTION, SOLUTION SUBCUTANEOUS NIGHTLY
Status: DISCONTINUED | OUTPATIENT
Start: 2019-12-03 | End: 2019-12-06 | Stop reason: HOSPADM

## 2019-12-03 RX ORDER — TRAMADOL HYDROCHLORIDE 50 MG/1
25 TABLET ORAL EVERY 6 HOURS PRN
Status: DISCONTINUED | OUTPATIENT
Start: 2019-12-03 | End: 2019-12-06 | Stop reason: HOSPADM

## 2019-12-03 RX ORDER — METOPROLOL TARTRATE 50 MG/1
50 TABLET, FILM COATED ORAL 2 TIMES DAILY
Status: DISCONTINUED | OUTPATIENT
Start: 2019-12-03 | End: 2019-12-06 | Stop reason: HOSPADM

## 2019-12-03 RX ORDER — NITROGLYCERIN 0.4 MG/1
0.4 TABLET SUBLINGUAL
Status: DISCONTINUED | OUTPATIENT
Start: 2019-12-03 | End: 2019-12-06 | Stop reason: HOSPADM

## 2019-12-03 RX ORDER — ATORVASTATIN CALCIUM 20 MG/1
20 TABLET, FILM COATED ORAL DAILY
Status: DISCONTINUED | OUTPATIENT
Start: 2019-12-04 | End: 2019-12-06 | Stop reason: HOSPADM

## 2019-12-03 RX ORDER — ACETAMINOPHEN 325 MG/1
650 TABLET ORAL EVERY 4 HOURS PRN
Status: DISCONTINUED | OUTPATIENT
Start: 2019-12-03 | End: 2019-12-06 | Stop reason: HOSPADM

## 2019-12-03 RX ORDER — SODIUM CHLORIDE 0.9 % (FLUSH) 0.9 %
10 SYRINGE (ML) INJECTION EVERY 12 HOURS SCHEDULED
Status: DISCONTINUED | OUTPATIENT
Start: 2019-12-03 | End: 2019-12-06 | Stop reason: HOSPADM

## 2019-12-03 RX ORDER — ALLOPURINOL 100 MG/1
100 TABLET ORAL DAILY
Status: DISCONTINUED | OUTPATIENT
Start: 2019-12-04 | End: 2019-12-06 | Stop reason: HOSPADM

## 2019-12-03 RX ORDER — SODIUM CHLORIDE 9 MG/ML
75 INJECTION, SOLUTION INTRAVENOUS CONTINUOUS
Status: DISCONTINUED | OUTPATIENT
Start: 2019-12-03 | End: 2019-12-04

## 2019-12-03 RX ADMIN — SODIUM CHLORIDE, PRESERVATIVE FREE 10 ML: 5 INJECTION INTRAVENOUS at 21:35

## 2019-12-03 RX ADMIN — TRAMADOL HYDROCHLORIDE 25 MG: 50 TABLET, FILM COATED ORAL at 22:22

## 2019-12-03 RX ADMIN — SODIUM CHLORIDE, PRESERVATIVE FREE 10 ML: 5 INJECTION INTRAVENOUS at 15:27

## 2019-12-03 RX ADMIN — INSULIN LISPRO 3 UNITS: 100 INJECTION, SOLUTION INTRAVENOUS; SUBCUTANEOUS at 21:34

## 2019-12-03 RX ADMIN — INSULIN GLARGINE 8 UNITS: 100 INJECTION, SOLUTION SUBCUTANEOUS at 23:21

## 2019-12-03 RX ADMIN — METOPROLOL TARTRATE 50 MG: 50 TABLET, FILM COATED ORAL at 23:21

## 2019-12-03 RX ADMIN — SODIUM CHLORIDE 75 ML/HR: 9 INJECTION, SOLUTION INTRAVENOUS at 15:26

## 2019-12-03 NOTE — H&P
"    Patient Name:  Val Jeronimo  YOB: 1929  MRN:  [id number]  Admit Date:  12/3/2019  Patient Care Team:  Harlan Arthur MD as PCP - General  Harlan Arthur MD as PCP - Claims Attributed  Jair Sarkar OD as Consulting Physician (Ophthalmology)  Ananth Yoo MD as Consulting Physician (Cardiology)  Quinten Kruse DPM (Podiatry)  Harlan Arthur MD as Referring Physician (Family Medicine)  Harlan Traore II, MD as Consulting Physician (Hematology and Oncology)      Subjective   History Present Illness     Chief Complaint   Patient presents with   • Fall     History of present illness  Ms. Jeronimo is a 89 y.o. with a history of DM2, persistant atrial fibrilation, pacemaker that presents to [facility] after having a fall this morning at her assisted living facility.  She states she got up to go to the bathroom and on the way back \"lost her balance\" and fell face first on the carpet next to the bed.  She denies any loss of consciousness, denies any dizzness, chest pain or palpitations.  She was able to push her medical alert bracelet for help as soon as she fell.  She denies any pain at this time, no focal neuro deficits.  She does walk with a walker at home but was not using it to go to the bathroom.         Review of Systems   Constitutional: Negative for activity change, appetite change, fatigue and fever.   HENT: Negative for congestion, nosebleeds, tinnitus, trouble swallowing and voice change.    Eyes: Negative for photophobia, redness and visual disturbance.   Respiratory: Negative for cough, chest tightness, shortness of breath and wheezing.    Cardiovascular: Negative for chest pain, palpitations and leg swelling.   Gastrointestinal: Negative for abdominal distention, constipation, nausea and vomiting.   Endocrine: Negative for cold intolerance and heat intolerance.   Genitourinary: Negative for dysuria, frequency, hematuria and urgency.   Musculoskeletal: " Positive for gait problem (basline a little unsteady, uses a walker for longer walks). Negative for joint swelling and myalgias.   Skin: Negative for color change and rash.   Neurological: Negative for dizziness, seizures, syncope, speech difficulty, weakness, light-headedness, numbness and headaches.   Psychiatric/Behavioral: Negative for agitation, behavioral problems and confusion.        Personal History     Past Medical History:   Diagnosis Date   • Arthritis    • Atrial fibrillation (CMS/HCC)    • CHF (congestive heart failure) (CMS/HCC)    • Degeneration of thoracic intervertebral disc    • Diabetes mellitus (CMS/HCC)    • Gout    • H/O Pulmonary nodule 10/2013   • History of anemia 10/2013   • History of cellulitis 2016    Finger of left hand   • Hyperlipidemia    • Hypertension    • Odontoid fracture (CMS/HCC)    • Osteoporosis    • Pacemaker      Past Surgical History:   Procedure Laterality Date   • NECK SURGERY  10/2013    Broken neck repair   • PACEMAKER IMPLANTATION       Family History   Problem Relation Age of Onset   • Heart disease Father    • Kidney cancer Sister 61        Renal cell carcinoma   • Leukemia Son 44        CML   • Colon cancer Daughter 64   • COPD Sister         non smoker     Social History     Tobacco Use   • Smoking status: Never Smoker   • Smokeless tobacco: Never Used   Substance Use Topics   • Alcohol use: Yes     Alcohol/week: 0.6 oz     Types: 1 Glasses of wine per week     Frequency: Monthly or less     Drinks per session: 1 or 2     Comment: occ/caffeine use   • Drug use: No     No current facility-administered medications on file prior to encounter.      Current Outpatient Medications on File Prior to Encounter   Medication Sig Dispense Refill   • allopurinol (ZYLOPRIM) 100 MG tablet Take 1 tablet by mouth Daily for 180 days. 90 tablet 1   • aspirin 81 MG tablet Take by mouth daily.     • atorvastatin (LIPITOR) 20 MG tablet Take 1 tablet by mouth Daily for 180 days. 90  tablet 1   • digoxin (LANOXIN) 125 MCG tablet TAKE 1 TABLET BY MOUTH ONCE DAILY 90 tablet 3   • furosemide (LASIX) 40 MG tablet TAKE 1 & 1/2 (ONE & ONE-HALF) TABLETS BY MOUTH ONCE DAILY 135 tablet 3   • hydrALAZINE (APRESOLINE) 25 MG tablet TAKE ONE TABLET BY MOUTH THREE TIMES DAILY 90 tablet 11   • irbesartan (AVAPRO) 300 MG tablet Take 1 tablet by mouth every night at bedtime for 180 days. 90 tablet 1   • metFORMIN (GLUCOPHAGE) 500 MG tablet Take 1 tablet by mouth 2 (Two) Times a Day With Meals for 180 days. 180 tablet 1   • metoprolol tartrate (LOPRESSOR) 50 MG tablet Take 1 tablet by mouth 2 (Two) Times a Day for 180 days. 180 tablet 1   • pioglitazone (ACTOS) 30 MG tablet Take 1 tablet by mouth Daily for 180 days. 90 tablet 1   • potassium chloride (MICRO-K) 10 MEQ CR capsule Take 20 mEq by mouth Daily.     • vitamin B-12 (CYANOCOBALAMIN) 1000 MCG tablet Take 1,000 mcg by mouth Daily.       Allergies   Allergen Reactions   • Advil [Ibuprofen] Unknown - Low Severity     .       Objective    Objective     Vital Signs  Temp:  [97.6 °F (36.4 °C)] 97.6 °F (36.4 °C)  Heart Rate:  [66-83] 79  Resp:  [13-18] 13  BP: (135-161)/(59-82) 144/69  SpO2:  [93 %-98 %] 98 %  on   ;   Device (Oxygen Therapy): room air  Body mass index is 23.91 kg/m².    Physical Exam   Constitutional: She is oriented to person, place, and time. She appears well-developed and well-nourished.   HENT:   Bruising around both eyes with mild swelling, Patietn able to open both eyes without problem. Small nasal swelling, trickles of blood continue from left nare.   Eyes: EOM are normal. Pupils are equal, round, and reactive to light.   Neck:   Aspen collar in place   Cardiovascular: Normal rate and normal pulses. An irregular rhythm present.   Pulmonary/Chest: Effort normal. She has decreased breath sounds in the right lower field and the left lower field.   Abdominal: Soft. Bowel sounds are normal. There is no tenderness.   Musculoskeletal: She  exhibits no edema.   Right 4th and 5th metatarsal wrapped with splint and ace wrap   Neurological: She is alert and oriented to person, place, and time. She has normal strength. No sensory deficit.   Skin: Skin is warm and dry.   Psychiatric: Her behavior is normal.       Results Review:  I reviewed the patient's new clinical results.  I reviewed the patient's new imaging results and agree with the interpretation.  I reviewed the patient's other test results and agree with the interpretation  I personally viewed and interpreted the patient's EKG/Telemetry data  Discussed with ED provider.    Lab Results (last 24 hours)     Procedure Component Value Units Date/Time    CBC & Differential [[id number]] Collected:  12/03/19 1038    Specimen:  Blood Updated:  12/03/19 1124    Narrative:       The following orders were created for panel order CBC & Differential.  Procedure                               Abnormality         Status                     ---------                               -----------         ------                     CBC Auto Differential[[id number]]        Abnormal            Final result                 Please view results for these tests on the individual orders.    Basic Metabolic Panel [[id number]]  (Abnormal) Collected:  12/03/19 1038    Specimen:  Blood Updated:  12/03/19 1108     Glucose 131 mg/dL      BUN 20 mg/dL      Creatinine 0.88 mg/dL      Sodium 137 mmol/L      Potassium 3.8 mmol/L      Chloride 101 mmol/L      CO2 22.5 mmol/L      Calcium 8.9 mg/dL      eGFR Non African Amer 60 mL/min/1.73      BUN/Creatinine Ratio 22.7     Anion Gap 13.5 mmol/L     Narrative:       GFR Normal >60  Chronic Kidney Disease <60  Kidney Failure <15    CBC Auto Differential [[id number]]  (Abnormal) Collected:  12/03/19 1038    Specimen:  Blood Updated:  12/03/19 1124     WBC 7.22 10*3/mm3      RBC 2.99 10*6/mm3      Hemoglobin 10.2 g/dL      Hematocrit 29.8 %      MCV 99.7 fL      MCH 34.1 pg      MCHC 34.2 g/dL       RDW 14.4 %      RDW-SD 52.1 fl      MPV 9.6 fL      Platelets 196 10*3/mm3      Neutrophil % 79.8 %      Lymphocyte % 10.9 %      Monocyte % 6.8 %      Eosinophil % 1.9 %      Basophil % 0.3 %      Immature Grans % 0.3 %      Neutrophils, Absolute 5.76 10*3/mm3      Lymphocytes, Absolute 0.79 10*3/mm3      Monocytes, Absolute 0.49 10*3/mm3      Eosinophils, Absolute 0.14 10*3/mm3      Basophils, Absolute 0.02 10*3/mm3      Immature Grans, Absolute 0.02 10*3/mm3      nRBC 0.0 /100 WBC     Digoxin Level [[id number]]  (Abnormal) Collected:  12/03/19 1038    Specimen:  Blood Updated:  12/03/19 1137     Digoxin 1.50 ng/mL           Imaging Results (Last 24 Hours)     Procedure Component Value Units Date/Time    CT Head Without Contrast [[id number]] Collected:  12/03/19 1011     Updated:  12/03/19 1133    Narrative:       CLINICAL HISTORY: Patient fell face first, hit her nose, has nasal  swelling, epistaxis and headache.     HEAD CT TECHNIQUE: Spiral CT images were obtained from the base of the  skull to the vertex without intravenous contrast. Images were  reformatted and submitted in 3 mm thick axial CT sections with brain  algorithm, 2 mm thick axial CT sections with high-resolution bone  algorithm, 2 mm thick sagittal and coronal reconstructions were  performed and submitted in brain algorithm.     COMPARISON: This is correlated to a prior head CT from [facility] on 10/26/2013.     FINDINGS: There is prominent patchy and confluent low density in the  periventricular and subcortical white matter of the cerebral hemispheres  consistent with moderate to severe small vessel disease. There are 3-4  mm rounded calcified subependymal nodules along the subependymal region  of the posterior lateral bodies of the lateral ventricles bilaterally as  well as in the lateral aspect of the trigone of the right lateral  ventricle measuring 6 x 4 mm and in the temporal horn of the right  lateral ventricle measuring 7  x 6 mm. These are unchanged. Ventricles  are normal in size. There is no mass effect and no midline shift and no  extraaxial fluid collections are identified and there is no evidence of  acute intracranial hemorrhage. There are multiple small nodular areas of  sclerosis in the superior frontal parietal bones and posterior occipital  bones. 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 No acute skull fracture is  seen. There is extensive soft tissue swelling in the paranasal soft  tissues. No acute skull fracture is identified. Hairline lucencies in  the right and left nasal bones are suspicious for hairline nondisplaced  nasal bone fractures. There is mild mucosal thickening in the frontal  recesses, the anterior ethmoid sinuses, inferior maxillary sinus. There  is a small amount of fluid in the posterior left maxillary and sphenoid  sinus. The mastoid and middle ear cavities are clear.       Impression:       1. Since prior head CT 10/26/2013 there has been facial trauma with a  paranasal hematoma and hairline lucencies in the nasal bone suspicious  for hairline nondisplaced nasal bone fractures. There is some blood in  the anterior nasal cavity. There is also mild bilateral frontal recess  anterior ethmoid and inferior maxillary sinus mucosal thickening with a  tiny amount of fluid in the posterior left maxillary and sphenoid  sinuses that is new. The remainder of the head CT is unchanged.  2. There is moderate to severe small vessel disease in the cerebral  white matter. There are at least 4 separate calcified subependymal  nodules including rounded 3-4 mm calcified nodules in the lateral  subependymal region and the posterior bodies of the lateral ventricles  bilaterally and a 6 x 4 mm focus in the lateral trigone of the right  lateral ventricle and a 7 x 6 mm calcified  subependymal nodule in the  temporal horn of the right lateral ventricle.  3. 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 The remainder  of the head CT is normal with no acute skull fracture or intracranial  hemorrhage identified     FACIAL CT TECHNIQUE: Spiral CT images were obtained through the facial  bones without intravenous contrast in axial imaging plane. Images were  reformatted and are submitted in 2 mm thick axial CT sections with soft  tissue algorithm and 1 mm thick axial, sagittal and coronal CT sections  with high-resolution bone algorithm.     COMPARISON: This is correlated to a prior cervical spine CT 10/26/2013  as well as 10/31/2013.     FINDINGS: There is a hematoma in the paranasal region [] trauma.  Minimally comminuted fracture of the anterior left nasal bone on  sagittal reconstructed images 72 through 74. There appears to be some  minimal inferior angulation of a 6 mm distal left nasal bone fracture  fragment. There is a hematoma in the left nostril. There is mild mucosal  thickening of the frontal recesses, anterior ethmoid sinus, a small  amount of fluid in the posterior left maxillary and left sphenoid sinus,  nodular mucosal thickening inferior maxillary sinuses bilaterally, right  greater than left. Patient has a screw extending from the anterior  inferior body of C2 through a chronic nonunited fracture at the base of  the odontoid process. The screw has some lucency around its margins as  it extends into the superior aspect of the odontoid process suggesting  it may be mildly loosened.     IMPRESSION:  1. There is a  hematoma in the paranasal region as well as within the  left nostril and there is blood opacifying the anterior and anterior  superior aspect of the nasal cavity along the right and left sides of  the anterior and anterior superior aspect of the nasal septum. There is  an acute mildly comminuted fracture of the left nasal bone with minimal  inferior angulation and deviation of a 6 mm distal left nasal bone  fracture fragment.  2. Patient has had prior surgical fixation via screw of the base of the  odontoid fracture back in 2013 and there is a screw extending from the  anterior inferior body  of C2 through a nonunited fracture through the  base the odontoid process and there is lucency along the margin of the  screw as it extends through the body of C2 and within the odontoid  process suggesting the screw may be mildly loose. There are prominent  arthritic changes at the atlantodental articulation.      Results communicated to Dr. Matute in the emergency room by telephone  on 12/03/2019 at 9:50 AM.     Radiation dose reduction techniques were utilized, including automated  exposure control and exposure modulation based on body size.     This report was finalized on 12/3/2019 11:30 AM by Dr. Pablo Fortune M.D.       CT Facial Bones Without Contrast [[id number]] Collected:  12/03/19 1011     Updated:  12/03/19 1133    Narrative:       CLINICAL HISTORY: Patient fell face first, hit her nose, has nasal  swelling, epistaxis and headache.     HEAD CT TECHNIQUE: Spiral CT images were obtained from the base of the  skull to the vertex without intravenous contrast. Images were  reformatted and submitted in 3 mm thick axial CT sections with brain  algorithm, 2 mm thick axial CT sections with high-resolution bone  algorithm, 2 mm thick sagittal and coronal reconstructions were  performed and submitted in brain algorithm.     COMPARISON: This is correlated to a prior head CT from [facility] on 10/26/2013.      FINDINGS: There is prominent patchy and confluent low density in the  periventricular and subcortical white matter of the cerebral hemispheres  consistent with moderate to severe small vessel disease. There are 3-4  mm rounded calcified subependymal nodules along the subependymal region  of the posterior lateral bodies of the lateral ventricles bilaterally as  well as in the lateral aspect of the trigone of the right lateral  ventricle measuring 6 x 4 mm and in the temporal horn of the right  lateral ventricle measuring 7 x 6 mm. These are unchanged. Ventricles  are normal in size. There is no mass effect and no midline shift and no  extraaxial fluid collections are identified and there is no evidence of  acute intracranial hemorrhage. There are multiple small nodular areas of  sclerosis in the superior frontal parietal bones and posterior occipital  bones. 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 No acute skull fracture is  seen. There is extensive soft tissue swelling in the paranasal soft  tissues. No acute skull fracture is identified. Hairline lucencies in  the right and left nasal bones are suspicious for hairline nondisplaced  nasal bone fractures. There is mild mucosal thickening in the frontal  recesses, the anterior ethmoid sinuses, inferior maxillary sinus. There  is a small amount of fluid in the posterior left maxillary and sphenoid  sinus. The mastoid and middle ear cavities are clear.       Impression:       1. Since prior head CT 10/26/2013 there has been facial trauma with a  paranasal hematoma and hairline lucencies in the nasal bone suspicious  for hairline nondisplaced nasal bone fractures. There is some blood in  the anterior nasal cavity. There is also mild bilateral frontal recess  anterior ethmoid and inferior maxillary sinus mucosal thickening with  a  tiny amount of fluid in the posterior left maxillary and sphenoid  sinuses that is new. The remainder of the head CT is unchanged.  2. There is moderate to severe small vessel disease in the cerebral  white matter. There are at least 4 separate calcified subependymal  nodules including rounded 3-4 mm calcified nodules in the lateral  subependymal region and the posterior bodies of the lateral ventricles  bilaterally and a 6 x 4 mm focus in the lateral trigone of the right  lateral ventricle and a 7 x 6 mm calcified subependymal nodule in the  temporal horn of the right lateral ventricle.  3. 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 The remainder  of the head CT is normal with no acute skull fracture or intracranial  hemorrhage identified     FACIAL CT TECHNIQUE: Spiral CT images were obtained through the facial  bones without intravenous contrast in axial imaging plane. Images were  reformatted and are submitted in 2 mm thick axial CT sections with soft  tissue algorithm and 1 mm thick axial, sagittal and coronal CT sections  with high-resolution bone algorithm.     COMPARISON: This is correlated to a prior cervical spine CT 10/26/2013  as well as 10/31/2013.     FINDINGS: There is a hematoma in the paranasal region [] trauma.  Minimally comminuted fracture of the anterior left nasal bone on  sagittal reconstructed images 72 through 74. There appears to be some  minimal inferior angulation of a 6 mm distal left nasal bone fracture  fragment. There is a hematoma in the left nostril. There is mild mucosal  thickening of the frontal recesses,  anterior ethmoid sinus, a small  amount of fluid in the posterior left maxillary and left sphenoid sinus,  nodular mucosal thickening inferior maxillary sinuses bilaterally, right  greater than left. Patient has a screw extending from the anterior  inferior body of C2 through a chronic nonunited fracture at the base of  the odontoid process. The screw has some lucency around its margins as  it extends into the superior aspect of the odontoid process suggesting  it may be mildly loosened.     IMPRESSION:  1. There is a hematoma in the paranasal region as well as within the  left nostril and there is blood opacifying the anterior and anterior  superior aspect of the nasal cavity along the right and left sides of  the anterior and anterior superior aspect of the nasal septum. There is  an acute mildly comminuted fracture of the left nasal bone with minimal  inferior angulation and deviation of a 6 mm distal left nasal bone  fracture fragment.  2. Patient has had prior surgical fixation via screw of the base of the  odontoid fracture back in 2013 and there is a screw extending from the  anterior inferior body  of C2 through a nonunited fracture through the  base the odontoid process and there is lucency along the margin of the  screw as it extends through the body of C2 and within the odontoid  process suggesting the screw may be mildly loose. There are prominent  arthritic changes at the atlantodental articulation.      Results communicated to Dr. Matute in the emergency room by telephone  on 12/03/2019 at 9:50 AM.     Radiation dose reduction techniques were utilized, including automated  exposure control and exposure modulation based on body size.     This report was finalized on 12/3/2019 11:30 AM by Dr. Pablo Fortune M.D.       CT Cervical Spine Without Contrast [[id number]] Collected:  12/03/19 1100     Updated:  12/03/19 1131    Narrative:       EMERGENCY NONCONTRAST CT SCAN OF THE CERVICAL SPINE 12/03/2019      CLINICAL HISTORY: Fell, facial trauma and has neck pain.     TECHNIQUE: Spiral CT images were obtained from the skull base down to  T2-3 thoracic level and images were reformatted and submitted in 2 mm  thick axial, sagittal, and coronal CT sections with soft tissue  algorithm and 1 mm thick axial, sagittal and coronal CT sections with  high-resolution bone algorithm.     This is correlated to prior cervical spine CTs from [facility] on 10/26/2013 and 10/31/2013.     FINDINGS: Back in 10/2013, the patient had a screw placed from the  anterior inferior cortex of the body of C2 through a  fracture through  the base of the odontoid process with the superior aspect of the screw  in the superior tip of the odontoid. There is a 2 mm wide chronic  nonunited fracture gap at the base of the odontoid process.  There is  some loosening along the margins of the screw within the body of C2 and  the odontoid suggesting that the screw is slightly loosened.  There are  prominent arthritic changes at the atlantodental interval. There is some  new bone formation anterior to the screw and anterior aspect of the  superior body of C2 and the base of the odontoid within the prevertebral  soft tissues. The ring of C1 is intact.  The remainder of C2 vertebra is  intact. At C2-3,  the posterior disc margin and facets are normal with  no canal or foraminal narrowing.     At C3-4, there is mild to moderate bilateral facet overgrowth, a 2 mm  retrolisthesis of C3 on C4, diffuse posterior disc osteophyte complex  abuts and mildly deforms the ventral surface of the cord mildly to  moderately narrowing the canal.  There is bilateral uncovertebral joint  hypertrophy and there is moderate bilateral bony foraminal narrowing.     At C4-5, there is diffuse posterior disc osteophyte complex.  There is  mild canal narrowing, there is mild to moderate bilateral facet  overgrowth, bilateral uncovertebral joint hypertrophy.  There is  mild  left and mild to moderate right bony foraminal narrowing.     At C5-6, there is disc space narrowing and degenerative endplate  changes, posterior endplate spurring and uncovertebral joint hypertrophy  and mild bilateral facet overgrowth. There is mild canal and there is  mild to moderate left and moderate right foraminal narrowing.     At C6-7, there is a cleft through the anterior inferior corner of the C6  vertebra.  There is a  sharp horizontal lucent cleft that is compatible  with an acute fracture through the far anterior-inferior corner of the  C6 vertebra, extends the fracture plane and extends into the anterior  aspect of this C6-7 disc space.  This is new when compared to prior cervical spine CT 10/31/2013. It is  felt to be an acute fracture, may be from an extension injury. There is  mild posterior endplate spurring and uncovertebral joint hypertrophy and  there is mild canal and there is mild to moderate bilateral bony  foraminal narrowing.     At C7-T1, there is mild to moderate right and there is moderate to  severe left facet overgrowth, a 2-3 mm degenerative anterolisthesis of  C7 on T1.  There is no canal narrowing, there is mild foraminal  narrowing.       Impression:       1. There is an acute fracture through the far anterior inferior corner  of the C6 vertebra extending into the anterior aspect of the C6-7 disc  space. No additional acute fractures are seen in the cervical spine and  may consider a cervical spine MRI to further characterize the ligaments  in the cervical spine at this level.  2. Patient back in 10/2013 had a screw placed from the anterior inferior  cortex of the C2 vertebra across the base of odontoid fracture and tip  of the screws in the superior aspect of the odontoid.  There is a  chronic 2 mm wide nonunited fracture cleft through the base of the  odontoid.  There is some lucency along the margin of the screw within  the body of C2 and in the odontoid suggesting the  screw may be mildly  lucent.  3. Diffuse cervical spondylosis as described.  4. Multiple small nodular areas of sclerosis in the cervical spine;  there are also sclerotic lesions in the facial bones and calvarium.   These have been evaluated by bone scan in the past and are not hot on  bone scan.  The patient does have calcified subependymal nodules.  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      The results were communicated to Dr. Matute in our emergency room by  telephone 2019.      Radiation dose reduction techniques were utilized, including automated  exposure control and exposure modulation based on body size.     This report was finalized on 12/3/2019 11:28 AM by Dr. Pablo Fortune M.D.       XR Hand 3+ View Right [[id number]] Collected:  19 0859     Updated:  19 1053    Narrative:       XR HAND 3+ VIEW RIGHT-  2019     HISTORY: Fell, hand injury.     FINDINGS: There is a mildly displaced fracture of the proximal aspect of  the proximal phalanx of the right 5th finger. The fracture does not  appear to involve the 5th metacarpophalangeal joint.     There also is a nondisplaced or minimally displaced fracture of the  proximal aspect of the proximal phalanx of the right 4th finger.     Bones are demineralized. There are degenerative changes of the  interphalangeal joints.       Impression:       Fractures of the proximal phalanges of the right 4th and 5th fingers as  described.     This report was finalized on 12/3/2019 10:50 AM by Dr. Cruzito Sanchez M.D.             Results for orders placed in visit on 14   CONVERTED (HISTORICAL) ECHO    Narrative Patient:      DIAN GARCIA    [facility] Rec#:     [id number]               :          1929            Date:         2014            Age:          84y                   Account#:     [id number]             Height:       167.64 cm / 66.0 in  Accession#:   [id number]               Weight:       69.4 kg / 153.0 lbs  Sex:          F                     BSA:          1.78  Room#:        2214                      Reading:      Lane Menendez MD  Reading:      BEATRICE  Referring:    AKASH  Sonographer:  JH  ______________________________________________________________________    Transthoracic Echocardiogram    Indication:  Heart failure  BP:           130/84  HR:           94    Findings       Technical Comments:  A complete two dimensional transthoracic echocardiogram with color flow  and Doppler was performed. The study quality is good.      Left Ventricle:  The left ventricular chamber size is normal. Severe concentric left  ventricular hypertrophy is observed. There is diffuse global hypokinesis  of the left ventricle. The ejection fraction was measured at 41.1%.     Left Atrium:  The left atrium is severely dilated.     Right Ventricle:  The right ventricular cavity size is normal. The right ventricular  global systolic function is normal.     Right Atrium:  The right atrial cavity size is severely dilated.     Aortic Valve:  The aortic valve structure is normal. Mild aortic leaflet calcification  is visualized. There is no evidence of aortic stenosis. There is no  evidence of aortic regurgitation.     Mitral Valve:  There is mitral annular calcification. There is no evidence of mitral  stenosis. There is mild to moderate mitral regurgitation.      Tricuspid Valve:  The tricuspid valve leaflets are normal.  There is moderate tricuspid  regurgitation. The right ventricular systolic pressure is calculated at  68 mmHg.  The findings are consistent with severe pulmonary  hypertension.   Pulmonic Valve:  The pulmonic valve appears normal in structure and function.     Pericardium:  A pericardial effusion is visualized. There is a moderate pericardial  effusion. A left pleural effusion is present. There is a  moderate  pleural effusion.      Aorta:  The aorta appears normal.      Conclusions  The left ventricular chamber size is normal.  Severe concentric left ventricular hypertrophy is observed.  There is diffuse global hypokinesis of the left ventricle.  The ejection fraction was measured at 41.1%.  The left atrium is severely dilated.  The right atrial cavity size is severely dilated.  Mild aortic leaflet calcification is visualized.  There is mitral annular calcification.  There is mild to moderate mitral regurgitation.   There is moderate tricuspid regurgitation.  The right ventricular systolic pressure is calculated at 68 mmHg.   The findings are consistent with severe pulmonary hypertension.  There is a moderate pericardial effusion.  A left pleural effusion is present.  There is a moderate pleural effusion.     Measurements   Chambers MM  Name                    Value         Normal Range              AV cusp separation (MM) 1.6 cm        -                            Chambers 2D  Name                    Value         Normal Range              IVSd (2D)               1.6 cm        -                          LVPWd (2D)              1.6 cm        -                          IVS:LVPW ratio (2D)     1 ratio       -                          LVIDd (2D)              4.5 cm        -                          LVIDs (2D)              3.6 cm        -                          LV FS (Teichholz) (2D)  20 %          -                          EF Teichholz (2D)       41.1 %        -                          Ao root diameter (2D)   3.4 cm        -                          LA dimension (AP) 2D    4.5 cm        -                          LA:Ao ratio (2D)        1.32 ratio    -                            Volumes/Mass  Name                    Value         Normal Range              LA ESV SP 4CH (MOD)     127 ml        -                          LA ESV SP 2CH (MOD)     116 ml        -                          LA ESV BP (MOD)         120  ml        -                          LA ESV BP (MOD) index   67.4 ml/m2    -                          LV EDV SP 4CH (MOD)     52 ml         -                          LV ESV SP 4CH (MOD)     30 ml         -                          EF SP 4CH (MOD)         42 %          -                            Diastolic/Systolic Function  Name                    Value         Normal Range              MV E-wave Vmax          1.06 m/sec    -                          MV deceleration time    132 msec      -                          LV septal e' Vmax       0.06 m/sec    -                          LV lateral e' Vmax      0.07 m/sec    -                          LV E:e' septal ratio    19.1 ratio    -                          LV E:e' lateral ratio   14.3 ratio    -                          LV average E:e' ratio   16.5 ratio    -                            Aortic Valve  Name                    Value         Normal Range              AV VTI                  36.3 cm       -                          AV mean gradient        8 mmHg        -                          LVOT diameter           2.2 cm        -                          LVOT VTI                15.4 cm       -                          LVOT mean gradient      1 mmHg        -                          SV LVOT                 59 ml         -                          HANSEL (continuity VTI)    1.61 cm2      -                            Mitral Valve  Name                    Value         Normal Range              MV VTI                  12.7 cm       -                          MV mean gradient        2 mmHg        -                          MV PHT                  42 msec       -                          MR Vmax                 5.52 m/sec    -                          MR VTI                  167 cm        -                          MR volume (PISA)        12 ml         -                          MR ERO                  0.07 cm2      -                          MR PISA radius          0.5 cm         -                          MR alias Vmax           23.1 cm/sec   -                          MVA (PHT)               5.24 cm2      -                          MVA (continuity VTI)    4.61 cm2      -                            Tricuspid Valve  Name                    Value         Normal Range              TR Vmax                 3.64 m/sec    -                          TR peak gradient        53 mmHg       -                          RAP                     15 mmHg       -                          RVSP                    68 mmHg       -                            Pulmonic Valve/Qp:Qs  Name                    Value         Normal Range              PV Vmax                 1 m/sec       -                          PV peak gradient        4 mmHg        -                          RVOT diameter           2.5 cm        -                          RVOT Vmax               0.87 m/sec    -                          RVOT VTI                13.3 cm       -                          RVOT peak gradient      3 mmHg        -                          SV RVOT                 65 ml         -                          PVA (continuity Vmax)   4.27 cm2      -                          Qp:Qs                   1.1 ratio     -                            Electronically signed by: Lane Menendez MD on 01/27/2014 10:47:02  Thank you for the courtesy of this referral.       ECG 12 Lead   Preliminary Result   HEART RATE= 73  bpm   RR Interval= 818  ms   WY Interval=   ms   P Horizontal Axis=   deg   P Front Axis= 0  deg   QRSD Interval= 152  ms   QT Interval= 407  ms   QRS Axis= 20  deg   T Wave Axis= 245  deg   - ABNORMAL ECG -   Ventricular-paced complexes   Electronically Signed By:    Date and Time of Study: 2019-12-03 11:21:43           Assessment/Plan     Active Hospital Problems    Diagnosis  POA   • **Closed nondisplaced fracture of sixth cervical vertebra (CMS/HCC) [S12.501A]  Yes   • Stage 3 chronic kidney disease (CMS/HCC) [N18.3]  Yes    • Anemia [D64.9]  Yes   • History of pacemaker [Z95.0]  Not Applicable   • Persistent atrial fibrillation [I48.19]  Yes   • Type 2 diabetes mellitus with both eyes affected by moderate nonproliferative retinopathy without macular edema, without long-term current use of insulin (CMS/[facility]) [E11.3393]  Yes   • Essential hypertension [I10]  Yes      Resolved Hospital Problems   No resolved problems to display.         Ms. Jeronimo is a 89 y.o. who fell at assisted living today on the way back from the bathroom. She had no LOC. She did sustained a C6 fracture, paranasal hematoma with acute comminuted fracture of the left nasal, bone and fractures of the 4th and 5th proximal phalanx.    · Neurosurgical consult for C-6 fracture, aspen collar in place.  · ENT consult for nasal fracture and paranasal hematoma with some persistent scant small nose bleed  · Pacemaker interrogation, currently in afib, dig level 1.5, hold digoxin   · AC/HS accu checks, last A1C 4/2019 was 6.8, hold metformin and actos, correctional insulin ac/hs  · Anemia: Hemoglobin 10.2, will recheck in the am  · Check orthostatic blood pressures  · PT to evaluate, lives in assisted living but may need more help now.  · Will address med rec when completed by RN    · I discussed the patient's findings and my recommendations with patient, nursing staff and ED provider.    VTE Prophylaxis - SCDs.  Code Status - .Full code        SAMI Houser  [facility]  12/03/19  1:35 PM       Brief Attending Admission Attestation     I have seen and examined the patient, performing an independent face-to-face diagnostic evaluation with plan of care reviewed and developed with the advanced practice registered nurse (APRN).      Brief Summary Statement/HPI  Ms. Jeronimo is a 89 y.o. female who is admitted with after a fall.  This occurred in her home after going to the bathroom.  She states she just felt dizzy and fell to the ground landing on her  face.  She denies LOC.  She was feeling fine prior to this event.  No chest pain, palpitations or seizure activity.  She recently had all her teeth removed and is been on somewhat of a liquid diet.  Family states she has not been eating or drinking very much at all.  Remainder of detailed HPI is as noted above and has been reviewed and/or edited by me for completeness.    Attending Physical Exam  Temp:  [97.4 °F (36.3 °C)-97.7 °F (36.5 °C)] 97.4 °F (36.3 °C)  Heart Rate:  [66-83] 72  Resp:  [13-20] 20  BP: (111-161)/(58-92) 111/58  Constitutional: alert, cooperative and no distress uncomfortable in appearance  Head: Raccoon eyes  Neck: Hard collar  Respiratory: clear to auscultation, unlabored  Cardiovascular: regular rate and rhythm, S1, S2 normal, no murmur  Abdominal: soft, non-tender, non-distended; BS normal; no masses or organomegaly  Musculoskeletal: no edema  Neurological: alert and oriented x 3, strength and sensation grossly normal    Assessment/Plan  Terribly unfortunate situation.  Neurosurgery following regarding cervical fracture.  ENT consulted regarding nasal fracture.  There was no syncope.  Will check orthostatics in the a.m.  Hold most of her blood pressure medicine for now.  Cautious hydration.    Discussed briefly with Dr. Esteban who will see in his office as outpatient after discharge.     Systolic .  Will hold Lasix, hydralazine and Avapro.  Digoxin slightly elevated.  Hold digoxin.  Hold Lasix/KCl.  Hold oral hypoglycemic medications.  Correctional insulin ordered.  Will add low-dose Lantus.  See above section for further detailed assessment and plan developed with APRN which I have reviewed and/or edited.      Electronically signed by Adolfo Grant MD, 12/3/2019, 10:00 PM.

## 2019-12-03 NOTE — PLAN OF CARE
Problem: Patient Care Overview  Goal: Plan of Care Review   12/03/19 6431   Coping/Psychosocial   Plan of Care Reviewed With patient   Coping/Psychosocial   Patient Agreement with Plan of Care agrees   OTHER   Outcome Summary VSS. pt arrived ER after fall. pt in hard collar for c 6 fracture. purewick started. waiting to hear from neurosx about activity restrictions. IVFs at 75cc/hr. SCD pump in the room waiting on sleeves. no complaints of pain.

## 2019-12-03 NOTE — CONSULTS
"[facility] NEUROSURGERY CONSULT NOTE    Patient name: Val Jeronimo  Referring Provider: Dr Matute  Reason for Consultation: s/p fall with C6 fracture    Patient Care Team:  Harlan Arthur MD as PCP - General  Harlan Arthur MD as PCP - Claims Attributed  Jair Sarkar OD as Consulting Physician (Ophthalmology)  Ananth Yoo MD as Consulting Physician (Cardiology)  Quinten Kruse DPM (Podiatry)  Harlan Arthur MD as Referring Physician (Family Medicine)  Harlan Traore II, MD as Consulting Physician (Hematology and Oncology)    Chief complaint: syncope/epistaxis     Subjective .     History of present illness:    Patient is a 89 y.o. right handed female who fell at her home yesterday evening when trying to get into bed.  She lost her balance and fell face forward hitting her nose on the floor.  CT imaging on arrival to the ER revealed facial fractures as well as an acute fracture through the inferior aspect of the C6 vertebrae.  Neurosurgery consulted for further evaluation.  She has undergone prior odontoid screw placement with Dr Wood in October 2013.    She typically ambulates with a Rollator walker and recently moved into an assisted living facility in October.  She has no weakness in her extremities at baseline.  She denies any dizziness, headaches or lightheadedness now.  She is complaining of facial pain and tenderness, as well as right hand and rib pain. She is on low-dose aspirin but takes no other antiplatelet or anticoagulant medications.        /75   Pulse 66   Temp 97.6 °F (36.4 °C) (Tympanic)   Resp 15   Ht 160 cm (63\")   Wt 61.2 kg (135 lb)   SpO2 96%   BMI 23.91 kg/m²         Review of Systems  Review of Systems   Constitutional: Positive for activity change and fatigue.   HENT: Positive for facial swelling, nosebleeds, sinus pressure and sinus pain.    Eyes: Negative for visual disturbance.   Respiratory: Negative.    Cardiovascular: Negative.    Gastrointestinal: " Negative.    Endocrine: Negative.    Genitourinary: Negative.    Musculoskeletal: Positive for arthralgias, joint swelling, myalgias and neck pain.   Skin: Positive for wound.   Allergic/Immunologic: Negative.    Neurological: Negative.    Hematological: Bruises/bleeds easily.   Psychiatric/Behavioral: Negative.        History  PAST MEDICAL HISTORY  Past Medical History:   Diagnosis Date   • Arthritis    • Atrial fibrillation (CMS/HCC)    • CHF (congestive heart failure) (CMS/HCC)    • Degeneration of thoracic intervertebral disc    • Diabetes mellitus (CMS/HCC)    • Gout    • H/O Pulmonary nodule 10/2013   • History of anemia 10/2013   • History of cellulitis 2016    Finger of left hand   • Hyperlipidemia    • Hypertension    • Odontoid fracture (CMS/HCC)    • Osteoporosis    • Pacemaker        PAST SURGICAL HISTORY  Past Surgical History:   Procedure Laterality Date   • NECK SURGERY  10/2013    Broken neck repair   • PACEMAKER IMPLANTATION         FAMILY HISTORY  Family History   Problem Relation Age of Onset   • Heart disease Father    • Kidney cancer Sister 61        Renal cell carcinoma   • Leukemia Son 44        CML   • Colon cancer Daughter 64   • COPD Sister         non smoker       SOCIAL HISTORY  Social History     Tobacco Use   • Smoking status: Never Smoker   • Smokeless tobacco: Never Used   Substance Use Topics   • Alcohol use: Yes     Alcohol/week: 0.6 oz     Types: 1 Glasses of wine per week     Frequency: Monthly or less     Drinks per session: 1 or 2     Comment: occ/caffeine use   • Drug use: No       Allergies:  Advil [ibuprofen]    MEDICATIONS:    (Not in a hospital admission)    Objective     Results Review:  LABS:    Admission on 12/03/2019   Component Date Value Ref Range Status   • Glucose 12/03/2019 131* 65 - 99 mg/dL Final   • BUN 12/03/2019 20  8 - 23 mg/dL Final   • Creatinine 12/03/2019 0.88  0.57 - 1.00 mg/dL Final   • Sodium 12/03/2019 137  136 - 145 mmol/L Final   • Potassium  12/03/2019 3.8  3.5 - 5.2 mmol/L Final   • Chloride 12/03/2019 101  98 - 107 mmol/L Final   • CO2 12/03/2019 22.5  22.0 - 29.0 mmol/L Final   • Calcium 12/03/2019 8.9  8.2 - 9.6 mg/dL Final   • eGFR Non African Amer 12/03/2019 60* >60 mL/min/1.73 Final   • BUN/Creatinine Ratio 12/03/2019 22.7  7.0 - 25.0 Final   • Anion Gap 12/03/2019 13.5  5.0 - 15.0 mmol/L Final       DIAGNOSTICS:  CT cervical spine from [facility] dated December 3, 2018 reveals placement of an odontoid screw with some loosening along the margins of the screw.  Multilevel degenerative changes are identified.  At C6 there is a cleft to the anterior inferior corner of the vertebrae compatible with an acute fracture.  Extends into the anterior aspect of the C6-7 disc space.    Results Review:   I reviewed the patient's new clinical results.  I personally viewed and interpreted the patient's CT cervical spine    Vital Signs   Temp:  [97.6 °F (36.4 °C)] 97.6 °F (36.4 °C)  Heart Rate:  [66-80] 66  Resp:  [15-18] 15  BP: (135-151)/(70-82) 147/75    Physical Exam:  Physical Exam   Constitutional: She is oriented to person, place, and time. She appears well-developed and well-nourished. She is cooperative.  Non-toxic appearance. She does not have a sickly appearance. She does not appear ill.   Very pleasant elderly female   HENT:   Significant facial trauma with lacerations, bruising and bilateral periorbital edema   Eyes: Pupils are equal, round, and reactive to light.   Neck: Neck supple. No tracheal deviation present.   Wearing a well fitting cervical hard collar   Pulmonary/Chest: Effort normal.   Musculoskeletal: She exhibits no deformity. Edema: significant facial edema.        Cervical back: She exhibits tenderness. She exhibits normal range of motion, no bony tenderness and no pain.   Decreased cervical range of motion due to hard collar  Decreased range of motion right hand and fingers due to fracture  Moving left upper extremity  "  Right hand brace  Well fitting hard collar     Neurological: She is alert and oriented to person, place, and time. She displays no tremor and normal reflexes. No sensory deficit. She exhibits normal muscle tone. Coordination normal. GCS eye subscore is 4. GCS verbal subscore is 5. GCS motor subscore is 6.   Unable to ambulate  Sensation intact throughout to soft touch  Negative drift, negative tremors  Normal deep tendon reflexes   Skin: Skin is warm and dry.   Psychiatric: She has a normal mood and affect. Her behavior is normal. Thought content normal.   Vitals reviewed.    Neurologic Exam     Mental Status   Oriented to person, place, and time.     Cranial Nerves     CN III, IV, VI   Pupils are equal, round, and reactive to light.      Assessment/Plan       Closed nondisplaced fracture of sixth cervical vertebra (CMS/HCC)      PLAN: Neurosurgery consulted status post fall resulting in facial, right rib, right hand and C6 fractures.  She has been placed in a cervical hard collar.  She has no neuro deficits on exam.  Sensation and strength remain intact.  C6 fracture appears acute and extends into the anterior aspect of the C6-7 disc space.    She is unable to have an MRI due to permanent pacemaker.  Will discuss with Dr. Cummings need for any additional imaging.  Thank you for the consult, she will be followed closely by our service.    I discussed the patients findings and my recommendations with patient, family, nursing staff and Dr Emiliano Valentine, APRN  12/03/19  11:19 AM    \"Dictated utilizing Dragon dictation\".      "

## 2019-12-03 NOTE — ED PROVIDER NOTES
EMERGENCY DEPARTMENT ENCOUNTER    CHIEF COMPLAINT  Chief Complaint: epistaxis  History given by: patient  History limited by: nothing  Room Number: 01/01  PMD: Harlan Arthur MD    HPI:  Pt is a 89 y.o. female who presents complaining of epistaxis that began prior to arrival s/p a fall. The patient reports she was getting back into bed when she lost her balance and fell. The patient reports she struck her nose on the floor but denies syncope. The patient also complains of nasal swelling and pain to the right fourth finger and right fifth finger. The patient denies any other sustaining injuries from the fall. The patient denies visual disturbance, malocclusion, or neck pain. The patient takes an 81 mg ASA but is not on any other anticoagulants currently. There are no other complaints at this time.    Duration: began PTA  Onset: sudden  Timing: constant  Location: nose  Quality: epistaxis  Intensity/Severity: moderate  Progression: resolved  Associated Symptoms: nasal swelling and pain to the right fourth finger and right fifth finger.  Aggravating Factors: none specified  Alleviating Factors: none specified  Previous Episodes: none specified  Treatment before arrival: none specified    PAST MEDICAL HISTORY  Active Ambulatory Problems     Diagnosis Date Noted   • Type 2 diabetes mellitus with both eyes affected by moderate nonproliferative retinopathy without macular edema, without long-term current use of insulin (CMS/[facility]) 02/08/2016   • Essential hypertension 02/08/2016   • Persistent atrial fibrillation 03/16/2016   • History of pacemaker 03/16/2016   • Other hyperlipidemia 03/16/2016   • Osteoporosis 04/12/2016   • Hand arthritis 10/18/2016   • Idiopathic chronic gout of multiple sites without tophus 03/15/2017   • Anemia 06/06/2018   • Abrasion of right lower extremity 08/02/2018   • Stage 3 chronic kidney disease (CMS/[facility]) 08/16/2018   • 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     Resolved Ambulatory Problems     Diagnosis  Date Noted   • Constipation 02/08/2016   • Gastric ulcer 02/08/2016   • Palpitations 02/08/2016     Past Medical History:   Diagnosis Date   • Arthritis    • Atrial fibrillation (CMS/HCC)    • CHF (congestive heart failure) (CMS/HCC)    • Degeneration of thoracic intervertebral disc    • Diabetes mellitus (CMS/HCC)    • Gout    • H/O Pulmonary nodule 10/2013   • History of anemia 10/2013   • History of cellulitis 2016   • Hyperlipidemia    • Hypertension    • Odontoid fracture (CMS/HCC)    • Osteoporosis    • Pacemaker        PAST SURGICAL HISTORY  Past Surgical History:   Procedure Laterality Date   • NECK SURGERY  10/2013    Broken neck repair   • PACEMAKER IMPLANTATION         FAMILY HISTORY  Family History   Problem Relation Age of Onset   • Heart disease Father    • Kidney cancer Sister 61        Renal cell carcinoma   • Leukemia Son 44        CML   • Colon cancer Daughter 64   • COPD Sister         non smoker       SOCIAL HISTORY  Social History     Socioeconomic History   • Marital status:      Spouse name: Noah   • Number of children: 5   • Years of education: High School   • Highest education level: Not on file   Occupational History   • Occupation: Homemaker     Employer: RETIRED   Tobacco Use   • Smoking status: Never Smoker   • Smokeless tobacco: Never Used   Substance and Sexual Activity   • Alcohol use: Yes     Alcohol/week: 0.6 oz     Types: 1 Glasses of wine per week     Frequency: Monthly or less     Drinks per session: 1 or 2     Comment: occ/caffeine use   • Drug use: No   • Sexual activity: Defer   Lifestyle   • Physical activity:     Days per week: 0 days     Minutes per session: 0 min   • Stress: Not at all   Relationships   • Social connections:     Talks on phone: More than three times a week     Gets together: More than three times a week     Attends [employer] service: 1 to 4 times per year     Active member of club or organization: Yes     Attends meetings of clubs or  organizations: 1 to 4 times per year     Relationship status:        ALLERGIES  Advil [ibuprofen]    REVIEW OF SYSTEMS  Review of Systems   Constitutional: Negative for fever.   HENT: Positive for facial swelling (nose) and nosebleeds (resolved). Negative for sore throat.    Eyes: Negative.  Negative for visual disturbance.   Respiratory: Negative for cough and shortness of breath.    Cardiovascular: Negative for chest pain.   Gastrointestinal: Negative for abdominal pain, diarrhea and vomiting.   Genitourinary: Negative for dysuria.   Musculoskeletal: Positive for myalgias (right fourth finger and right fifth finger). Negative for neck pain.   Skin: Negative for rash.   Allergic/Immunologic: Negative.    Neurological: Negative for weakness, numbness and headaches.   Hematological: Negative.    Psychiatric/Behavioral: Negative.    All other systems reviewed and are negative.      PHYSICAL EXAM  ED Triage Vitals [12/03/19 0808]   Temp Heart Rate Resp BP SpO2   97.6 °F (36.4 °C) 80 18 150/82 98 %      Temp src Heart Rate Source Patient Position BP Location FiO2 (%)   Tympanic Monitor -- -- --       Physical Exam   Constitutional: She is oriented to person, place, and time. No distress.   HENT:   Head: Normocephalic. Head is with abrasion (small abrasion to the left forehead) and with contusion (small contusion to the left forehead).   Nose: No nasal septal hematoma (bilaterally).   There is moderate swelling to nasal bridge with contusion and ecchymosis. There is no active bleeding from the nares. There is a small hematoma to the lateral aspect of the left naris.   Eyes: EOM are normal. Pupils are equal, round, and reactive to light.   Neck: Normal range of motion. Neck supple.   Cardiovascular: Normal rate, regular rhythm, normal heart sounds and intact distal pulses. Exam reveals no gallop and no friction rub.   No murmur heard.  Pulmonary/Chest: Effort normal. No respiratory distress. She has no decreased  breath sounds. She has no wheezes. She has no rhonchi. She has no rales. She exhibits no tenderness.   Abdominal: Soft. Bowel sounds are normal. She exhibits no distension and no mass. There is no tenderness. There is no rebound and no guarding.   Musculoskeletal:   Moves extremities x 4. There is mild soreness to the right fourth and fifth fingers with ROM. She has chronic bony abnormalities from gout. She has minimal flexion of those fingers which is baseline.   Neurological: She is alert and oriented to person, place, and time. She has normal sensation and normal strength.   Skin: Skin is warm and dry. No rash noted.   Psychiatric: Mood and affect normal.   Nursing note and vitals reviewed.      LAB RESULTS  Lab Results (last 24 hours)     Procedure Component Value Units Date/Time    CBC & Differential [[id number]] Collected:  12/03/19 1038    Specimen:  Blood Updated:  12/03/19 1111    Narrative:       The following orders were created for panel order CBC & Differential.  Procedure                               Abnormality         Status                     ---------                               -----------         ------                     CBC Auto Differential[[id number]]                            In process                   Please view results for these tests on the individual orders.    Basic Metabolic Panel [[id number]]  (Abnormal) Collected:  12/03/19 1038    Specimen:  Blood Updated:  12/03/19 1108     Glucose 131 mg/dL      BUN 20 mg/dL      Creatinine 0.88 mg/dL      Sodium 137 mmol/L      Potassium 3.8 mmol/L      Chloride 101 mmol/L      CO2 22.5 mmol/L      Calcium 8.9 mg/dL      eGFR Non African Amer 60 mL/min/1.73      BUN/Creatinine Ratio 22.7     Anion Gap 13.5 mmol/L     Narrative:       GFR Normal >60  Chronic Kidney Disease <60  Kidney Failure <15    CBC Auto Differential [[id number]] Collected:  12/03/19 1038    Specimen:  Blood Updated:  12/03/19 1111    Digoxin Level [[id number]] Collected:   12/03/19 1038    Specimen:  Blood Updated:  12/03/19 1102          I ordered the above labs and reviewed the results    RADIOLOGY  CT Cervical Spine Without Contrast   Preliminary Result   1. There is an acute fracture through the far anterior inferior corner   of the C6 vertebra extending into the anterior aspect of the C6-7 disc   space. No additional acute fractures are seen in the cervical spine and   may consider a cervical spine MRI to further characterize the ligaments   in the cervical spine at this level.   2. Patient back in 10/2013 had a screw placed from the anterior inferior   cortex of the C2 vertebra across the base of odontoid fracture and tip   of the screws in the superior aspect of the odontoid.  There is a   chronic 2 mm wide nonunited fracture cleft through the base of the   odontoid.  There is some lucency along the margin of the screw within   the body of C2 and in the odontoid suggesting the screw may be mildly   lucent.   3. Diffuse cervical spondylosis as described.   4. Multiple small nodular areas of sclerosis in the cervical spine;   there are also sclerotic lesions in the facial bones and calvarium.    These have been evaluated by bone scan in the past and are not hot on   bone scan.  The patient does have calcified subependymal nodules.  These   could be multiple bone islands from tuberous sclerosis potentially could   relate to the reported history of osteopoikilosis and correlate with   clinical history.        The results were communicated to Dr. Matute in our emergency room by   telephone 12/03/2019.        Radiation dose reduction techniques were utilized, including automated   exposure control and exposure modulation based on body size.              XR Hand 3+ View Right   Final Result   Fractures of the proximal phalanges of the right 4th and 5th fingers as   described.       This report was finalized on 12/3/2019 10:50 AM by Dr. Cruzito Sanchez M.D.          CT Head  Without Contrast   Preliminary Result   1. Since prior head CT 10/26/2013 there has been facial trauma with a   paranasal hematoma and hairline lucencies in the nasal bone suspicious   for hairline nondisplaced nasal bone fractures. There is some blood in   the anterior nasal cavity. There is also mild bilateral frontal recess   anterior ethmoid and inferior maxillary sinus mucosal thickening with a   tiny amount of fluid in the posterior left maxillary and sphenoid   sinuses that is new. The remainder of the head CT is unchanged.   2. There is moderate to severe small vessel disease in the cerebral   white matter. There are at least 4 separate calcified subependymal   nodules including rounded 3-4 mm calcified nodules in the lateral   subependymal region and the posterior bodies of the lateral ventricles   bilaterally and a 6 x 4 mm focus in the lateral trigone of the right   lateral ventricle and a 7 x 6 mm calcified subependymal nodule in the   temporal horn of the right lateral ventricle.   3. 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   The remainder of the head CT is normal with no acute skull fracture or   intracranial hemorrhage identified       FACIAL CT TECHNIQUE: Spiral CT images were obtained through the facial   bones without intravenous contrast in axial imaging plane. Images were   reformatted and are submitted in 2 mm thick axial CT sections with soft   tissue algorithm and 1 mm thick axial, sagittal and coronal CT sections   with high-resolution bone algorithm.       COMPARISON: This is correlated to a prior cervical spine CT 10/26/2013   as well as 10/31/2013.       FINDINGS: There is a hematoma in the paranasal region [] trauma.   Minimally comminuted fracture of the anterior left nasal bone on   sagittal reconstructed images  72 through 74. There appears to be some   minimal inferior angulation of a 6 mm distal left nasal bone fracture   fragment. There is a hematoma in the left nostril. There is mild mucosal   thickening of the frontal recesses, anterior ethmoid sinus, a small   amount of fluid in the posterior left maxillary and left sphenoid sinus,   nodular mucosal thickening inferior maxillary sinuses bilaterally, right   greater than left. Patient has a screw extending from the anterior   inferior body of C2 through a chronic nonunited fracture at the base of   the odontoid process. The screw has some lucency around its margins as   it extends into the superior aspect of the odontoid process suggesting   it may be mildly loosened.       IMPRESSION:   1. There is a hematoma in the paranasal region as well as within the   left nostril and there is blood opacifying the anterior and anterior   superior aspect of the nasal cavity along the right and left sides of   the [] anterior superior nasal septum. There is acute mildly comminuted   fracture in the left nasal bone with minimal inferior angulation and   deviation of a 6 mm distal left nasal bone fracture fragment.   2. Patient has had prior surgical fixation via screw of the base of the   odontoid fracture back in 2013 mm and there is a screw extending from   the anterior inferior body  of C2 through a nonunited fracture through   the base the odontoid process and there is lucency along the margin of   the screw as it extends through the body of C2 and within the odontoid   process suggesting the screw may be mildly lucent. There are prominent   arthritic changes at the atlantodental articulation.        Results communicated to Dr. Matute in the emergency room by telephone   on 12/03/2019 at 9:50 AM.       Radiation dose reduction techniques were utilized, including automated   exposure control and exposure modulation based on body size.              CT Facial Bones Without  Contrast   Preliminary Result   1. Since prior head CT 10/26/2013 there has been facial trauma with a   paranasal hematoma and hairline lucencies in the nasal bone suspicious   for hairline nondisplaced nasal bone fractures. There is some blood in   the anterior nasal cavity. There is also mild bilateral frontal recess   anterior ethmoid and inferior maxillary sinus mucosal thickening with a   tiny amount of fluid in the posterior left maxillary and sphenoid   sinuses that is new. The remainder of the head CT is unchanged.   2. There is moderate to severe small vessel disease in the cerebral   white matter. There are at least 4 separate calcified subependymal   nodules including rounded 3-4 mm calcified nodules in the lateral   subependymal region and the posterior bodies of the lateral ventricles   bilaterally and a 6 x 4 mm focus in the lateral trigone of the right   lateral ventricle and a 7 x 6 mm calcified subependymal nodule in the   temporal horn of the right lateral ventricle.   3. 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   The remainder of the head CT is normal with no acute skull fracture or   intracranial hemorrhage identified       FACIAL CT TECHNIQUE: Spiral CT images were obtained through the facial   bones without intravenous contrast in axial imaging plane. Images were   reformatted and are submitted in 2 mm thick axial CT sections with soft   tissue algorithm and 1 mm thick axial, sagittal and coronal CT sections   with high-resolution bone algorithm.       COMPARISON: This is correlated to a prior cervical spine CT 10/26/2013   as well as 10/31/2013.       FINDINGS: There is a hematoma in the paranasal region [] trauma.   Minimally comminuted fracture of the anterior left nasal bone on   sagittal reconstructed images 72  through 74. There appears to be some   minimal inferior angulation of a 6 mm distal left nasal bone fracture   fragment. There is a hematoma in the left nostril. There is mild mucosal   thickening of the frontal recesses, anterior ethmoid sinus, a small   amount of fluid in the posterior left maxillary and left sphenoid sinus,   nodular mucosal thickening inferior maxillary sinuses bilaterally, right   greater than left. Patient has a screw extending from the anterior   inferior body of C2 through a chronic nonunited fracture at the base of   the odontoid process. The screw has some lucency around its margins as   it extends into the superior aspect of the odontoid process suggesting   it may be mildly loosened.       IMPRESSION:   1. There is a hematoma in the paranasal region as well as within the   left nostril and there is blood opacifying the anterior and anterior   superior aspect of the nasal cavity along the right and left sides of   the [] anterior superior nasal septum. There is acute mildly comminuted   fracture in the left nasal bone with minimal inferior angulation and   deviation of a 6 mm distal left nasal bone fracture fragment.   2. Patient has had prior surgical fixation via screw of the base of the   odontoid fracture back in 2013 mm and there is a screw extending from   the anterior inferior body  of C2 through a nonunited fracture through   the base the odontoid process and there is lucency along the margin of   the screw as it extends through the body of C2 and within the odontoid   process suggesting the screw may be mildly lucent. There are prominent   arthritic changes at the atlantodental articulation.        Results communicated to Dr. Matute in the emergency room by telephone   on 12/03/2019 at 9:50 AM.       Radiation dose reduction techniques were utilized, including automated   exposure control and exposure modulation based on body size.                   I ordered the above noted  radiological studies. Interpreted by radiologist. Discussed with radiologist (Dr. Fortune). Reviewed by me in PACS.       PROCEDURES  Procedures    EKG          EKG time: 1121  Rhythm/Rate: paced rhythm, 73     Interpreted Contemporaneously by me, independently viewed.    PROGRESS AND CONSULTS      0815 Initial encounter. Patient is stable. BP- 135/70 HR- 80 Temp- 97.6 °F (36.4 °C) (Tympanic) O2 sat- 98% on RA. Offered her pain medication, but she declined at this time. Discussed the plan to check imaging studies. Pt understands and agrees with the plan, all questions answered.    0823 Ordered CT Head, CT Facial Bones, and R Hand XR for further evaluation. Placed order to apply ice to the affected area.    0858 Ordered CT C Spine for further evaluation.    1026 Rechecked the patient who is resting comfortably and in NAD. Patient is stable. BP- 135/70 HR- 80 Temp- 97.6 °F (36.4 °C) (Tympanic) O2 sat- 98%. On repeat exam, the bleeding has resolved. There is no active epistaxis. She is neurovascularly intact. Informed the patient and her family of the CT Facial Bones which shows a fracture of the nasal bone. Also informed the patient and her family of the CT C Spine which shows a fracture of C6. Discussed the plan to consult Neurosurgery. Pt understands and agrees with the plan, all questions answered.    1028 Placed call to Neurosurgery for consult. Placed order to obtain and apply a finger splint. Placed order to obtain and apply a cervical collar.    1052 Received a call from SAMI Mahmood (Neurosurgery) and discussed pt's case. She agreed with plan to consult.    1055 Ordered Digoxin Level for further evaluation. Placed call to [facility] for admission.    1110 Received a call from SAMI Altamirano ([facility]) and discussed pt's case. She agreed with plan to admit the patient to Dr. Grant ([facility]) for further evaluation and management.    1111 Ordered EKG for further evaluation.      MEDICAL DECISION MAKING  Results were  reviewed/discussed with the patient and they were also made aware of online access. Pt also made aware that some labs, such as cultures, will not be resulted during ER visit and follow up with PMD is necessary.     MDM  Number of Diagnoses or Management Options  Closed fracture of nasal bone, initial encounter:   Closed nondisplaced fracture of proximal phalanx of right little finger, initial encounter:   Closed nondisplaced fracture of proximal phalanx of right ring finger, initial encounter:   Closed nondisplaced fracture of sixth cervical vertebra, unspecified fracture morphology, initial encounter (CMS/[facility]):      Amount and/or Complexity of Data Reviewed  Clinical lab tests: reviewed and ordered (Unremarkable)  Tests in the radiology section of CPT®: ordered and reviewed (Xr Hand 3+ View Right    Result Date: 12/3/2019  Narrative: XR HAND 3+ VIEW RIGHT-  12/03/2019  HISTORY: Fell, hand injury.  FINDINGS: There is a mildly displaced fracture of the proximal aspect of the proximal phalanx of the right 5th finger. The fracture does not appear to involve the 5th metacarpophalangeal joint.  There also is a nondisplaced or minimally displaced fracture of the proximal aspect of the proximal phalanx of the right 4th finger.  Bones are demineralized. There are degenerative changes of the interphalangeal joints.      Impression: Fractures of the proximal phalanges of the right 4th and 5th fingers as described.  This report was finalized on 12/3/2019 10:50 AM by Dr. Cruzito Sanchez M.D.      Ct Head Without Contrast    Result Date: 12/3/2019  Narrative: CLINICAL HISTORY: Patient fell face first, hit her nose, has nasal swelling, epistaxis and headache.  HEAD CT TECHNIQUE: Spiral CT images were obtained from the base of the skull to the vertex without intravenous contrast. Images were reformatted and submitted in 3 mm thick axial CT sections with brain algorithm, 2 mm thick axial CT sections with high-resolution bone  algorithm, 2 mm thick sagittal and coronal reconstructions were performed and submitted in brain algorithm.  COMPARISON: This is correlated to a prior head CT from [facility] on 10/26/2013.  FINDINGS: There is prominent patchy and confluent low density in the periventricular and subcortical white matter of the cerebral hemispheres consistent with moderate to severe small vessel disease. There are 3-4 mm rounded calcified subependymal nodules along the subependymal region of the posterior lateral bodies of the lateral ventricles bilaterally as well as in the lateral aspect of the trigone of the right lateral ventricle measuring 6 x 4 mm and in the temporal horn of the right lateral ventricle measuring 7 x 6 mm. These are unchanged. Ventricles are normal in size. There is no mass effect and no midline shift and no extraaxial fluid collections are identified and there is no evidence of acute intracranial hemorrhage. There are multiple small nodular areas of sclerosis in the superior frontal parietal bones and posterior occipital bones. This has been evaluated in the past with bone scan and by history the patient does have a family history of osteopoikilosis and this is probably a manifestation of osteopoikilosis. No acute skull fracture is seen. There is extensive soft tissue swelling in the paranasal soft tissues. No acute skull fracture is identified. Hairline lucencies in the right and left nasal bones are suspicious for hairline nondisplaced nasal bone fractures. There is mild mucosal thickening in the frontal recesses, the anterior ethmoid sinuses, inferior maxillary sinus. There is a small amount of fluid in the posterior left maxillary and sphenoid sinus. The mastoid and middle ear cavities are clear.      Impression: 1. Since prior head CT 10/26/2013 there has been facial trauma with a paranasal hematoma and hairline lucencies in the nasal bone suspicious for hairline nondisplaced nasal bone  fractures. There is some blood in the anterior nasal cavity. There is also mild bilateral frontal recess anterior ethmoid and inferior maxillary sinus mucosal thickening with a tiny amount of fluid in the posterior left maxillary and sphenoid sinuses that is new. The remainder of the head CT is unchanged. 2. There is moderate to severe small vessel disease in the cerebral white matter. There are at least 4 separate calcified subependymal nodules including rounded 3-4 mm calcified nodules in the lateral subependymal region and the posterior bodies of the lateral ventricles bilaterally and a 6 x 4 mm focus in the lateral trigone of the right lateral ventricle and a 7 x 6 mm calcified subependymal nodule in the temporal horn of the right lateral ventricle. 3. 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 The remainder of the head CT is normal with no acute skull fracture or intracranial hemorrhage identified  FACIAL CT TECHNIQUE: Spiral CT images were obtained through the facial bones without intravenous contrast in axial imaging plane. Images were reformatted and are submitted in 2 mm thick axial CT sections with soft tissue algorithm and 1 mm thick axial, sagittal and coronal CT sections with high-resolution bone algorithm.  COMPARISON: This is correlated to a prior cervical spine CT 10/26/2013 as well as 10/31/2013.  FINDINGS: There is a hematoma in the paranasal region () trauma. Minimally comminuted fracture of the anterior left nasal bone on sagittal reconstructed images 72 through 74. There appears to be some minimal inferior angulation of a 6 mm distal left nasal bone fracture fragment. There is a hematoma in the left nostril. There is mild mucosal thickening of the frontal recesses, anterior ethmoid sinus, a small amount of fluid in the  posterior left maxillary and left sphenoid sinus, nodular mucosal thickening inferior maxillary sinuses bilaterally, right greater than left. Patient has a screw extending from the anterior inferior body of C2 through a chronic nonunited fracture at the base of the odontoid process. The screw has some lucency around its margins as it extends into the superior aspect of the odontoid process suggesting it may be mildly loosened.  IMPRESSION: 1. There is a hematoma in the paranasal region as well as within the left nostril and there is blood opacifying the anterior and anterior superior aspect of the nasal cavity along the right and left sides of the () anterior superior nasal septum. There is acute mildly comminuted fracture in the left nasal bone with minimal inferior angulation and deviation of a 6 mm distal left nasal bone fracture fragment. 2. Patient has had prior surgical fixation via screw of the base of the odontoid fracture back in 2013 mm and there is a screw extending from the anterior inferior body  of C2 through a nonunited fracture through the base the odontoid process and there is lucency along the margin of the screw as it extends through the body of C2 and within the odontoid process suggesting the screw may be mildly lucent. There are prominent arthritic changes at the atlantodental articulation.  Results communicated to Dr. Matute in the emergency room by telephone on 12/03/2019 at 9:50 AM.  Radiation dose reduction techniques were utilized, including automated exposure control and exposure modulation based on body size.       Ct Cervical Spine Without Contrast    Result Date: 12/3/2019  Narrative: EMERGENCY NONCONTRAST CT SCAN OF THE CERVICAL SPINE 12/03/2019  CLINICAL HISTORY: Fell, facial trauma and has neck pain.  TECHNIQUE: Spiral CT images were obtained from the skull base down to T2-3 thoracic level and images were reformatted and submitted in 2 mm thick axial, sagittal, and coronal CT  sections with soft tissue algorithm and 1 mm thick axial, sagittal and coronal CT sections with high-resolution bone algorithm.  This is correlated to prior cervical spine CTs from [facility] on 10/26/2013 and 10/31/2013.  FINDINGS: Back in 10/2013, the patient had a screw placed from the anterior inferior cortex of the body of C2 through a  fracture through the base of the odontoid process with the superior aspect of the screw in the superior tip of the odontoid. There is a 2 mm wide chronic nonunited fracture gap at the base of the odontoid process.  There is some loosening along the margins of the screw within the body of C2 and the odontoid suggesting that the screw is slightly loosened.  There are prominent arthritic changes at the atlantodental interval. There is some new bone formation anterior to the screw and anterior aspect of the superior body of C2 and the base of the odontoid within the prevertebral soft tissues. The ring of C1 is intact.  The remainder of C2 vertebra is intact. At C2-3,  the posterior disc margin and facets are normal with no canal or foraminal narrowing.  At C3-4, there is mild to moderate bilateral facet overgrowth, a 2 mm retrolisthesis of C3 on C4, diffuse posterior disc osteophyte complex abuts and mildly deforms the ventral surface of the cord mildly to moderately narrowing the canal.  There is bilateral uncovertebral joint hypertrophy and there is moderate bilateral bony foraminal narrowing.  At C4-5, there is diffuse posterior disc osteophyte complex.  There is mild canal narrowing, there is mild to moderate bilateral facet overgrowth, bilateral uncovertebral joint hypertrophy.  There is mild left and mild to moderate right bony foraminal narrowing.  At C5-6, there is disc space narrowing and degenerative endplate changes, posterior endplate spurring and uncovertebral joint hypertrophy and mild bilateral facet overgrowth. There is mild canal and there is mild to  moderate left and moderate right foraminal narrowing.  At C6-7, there is a cleft through the anterior inferior corner of the C6 vertebra.  There is a  sharp horizontal lucent cleft that is compatible with an acute fracture through the far anterior-inferior corner of the C6 vertebra, extends the fracture plane and extends into the anterior aspect of this C6-7 disc space. This is new when compared to prior cervical spine CT 10/31/2013. It is felt to be an acute fracture, may be from an extension injury. There is mild posterior endplate spurring and uncovertebral joint hypertrophy and there is mild canal and there is mild to moderate bilateral bony foraminal narrowing.  At C7-T1, there is mild to moderate right and there is moderate to severe left facet overgrowth, a 2-3 mm degenerative anterolisthesis of C7 on T1.  There is no canal narrowing, there is mild foraminal narrowing.      Impression: 1. There is an acute fracture through the far anterior inferior corner of the C6 vertebra extending into the anterior aspect of the C6-7 disc space. No additional acute fractures are seen in the cervical spine and may consider a cervical spine MRI to further characterize the ligaments in the cervical spine at this level. 2. Patient back in 10/2013 had a screw placed from the anterior inferior cortex of the C2 vertebra across the base of odontoid fracture and tip of the screws in the superior aspect of the odontoid.  There is a chronic 2 mm wide nonunited fracture cleft through the base of the odontoid.  There is some lucency along the margin of the screw within the body of C2 and in the odontoid suggesting the screw may be mildly lucent. 3. Diffuse cervical spondylosis as described. 4. Multiple small nodular areas of sclerosis in the cervical spine; there are also sclerotic lesions in the facial bones and calvarium. These have been evaluated by bone scan in the past and are not hot on bone scan.  The patient does have calcified  subependymal nodules.  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  The results were communicated to Dr. Matute in our emergency room by telephone 12/03/2019.  Radiation dose reduction techniques were utilized, including automated exposure control and exposure modulation based on body size.       Ct Facial Bones Without Contrast    Result Date: 12/3/2019  Narrative: CLINICAL HISTORY: Patient fell face first, hit her nose, has nasal swelling, epistaxis and headache.  HEAD CT TECHNIQUE: Spiral CT images were obtained from the base of the skull to the vertex without intravenous contrast. Images were reformatted and submitted in 3 mm thick axial CT sections with brain algorithm, 2 mm thick axial CT sections with high-resolution bone algorithm, 2 mm thick sagittal and coronal reconstructions were performed and submitted in brain algorithm.  COMPARISON: This is correlated to a prior head CT from [facility] on 10/26/2013.  FINDINGS: There is prominent patchy and confluent low density in the periventricular and subcortical white matter of the cerebral hemispheres consistent with moderate to severe small vessel disease. There are 3-4 mm rounded calcified subependymal nodules along the subependymal region of the posterior lateral bodies of the lateral ventricles bilaterally as well as in the lateral aspect of the trigone of the right lateral ventricle measuring 6 x 4 mm and in the temporal horn of the right lateral ventricle measuring 7 x 6 mm. These are unchanged. Ventricles are normal in size. There is no mass effect and no midline shift and no extraaxial fluid collections are identified and there is no evidence of acute intracranial hemorrhage. There are multiple small nodular areas of sclerosis in the superior frontal parietal bones and posterior occipital bones. 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 No acute skull fracture is seen. There is extensive soft tissue swelling in the paranasal soft tissues. No acute skull fracture is identified. Hairline lucencies in the right and left nasal bones are suspicious for hairline nondisplaced nasal bone fractures. There is mild mucosal thickening in the frontal recesses, the anterior ethmoid sinuses, inferior maxillary sinus. There is a small amount of fluid in the posterior left maxillary and sphenoid sinus. The mastoid and middle ear cavities are clear.      Impression: 1. Since prior head CT 10/26/2013 there has been facial trauma with a paranasal hematoma and hairline lucencies in the nasal bone suspicious for hairline nondisplaced nasal bone fractures. There is some blood in the anterior nasal cavity. There is also mild bilateral frontal recess anterior ethmoid and inferior maxillary sinus mucosal thickening with a tiny amount of fluid in the posterior left maxillary and sphenoid sinuses that is new. The remainder of the head CT is unchanged. 2. There is moderate to severe small vessel disease in the cerebral white matter. There are at least 4 separate calcified subependymal nodules including rounded 3-4 mm calcified nodules in the lateral subependymal region and the posterior bodies of the lateral ventricles bilaterally and a 6 x 4 mm focus in the lateral trigone of the right lateral ventricle and a 7 x 6 mm calcified subependymal nodule in the temporal horn of the right lateral ventricle. 3. 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 The remainder of the head CT is normal with no acute skull fracture or intracranial hemorrhage  identified  FACIAL CT TECHNIQUE: Spiral CT images were obtained through the facial bones without intravenous contrast in axial imaging plane. Images were reformatted and are submitted in 2 mm thick axial CT sections with soft tissue algorithm and 1 mm thick axial, sagittal and coronal CT sections with high-resolution bone algorithm.  COMPARISON: This is correlated to a prior cervical spine CT 10/26/2013 as well as 10/31/2013.  FINDINGS: There is a hematoma in the paranasal region () trauma. Minimally comminuted fracture of the anterior left nasal bone on sagittal reconstructed images 72 through 74. There appears to be some minimal inferior angulation of a 6 mm distal left nasal bone fracture fragment. There is a hematoma in the left nostril. There is mild mucosal thickening of the frontal recesses, anterior ethmoid sinus, a small amount of fluid in the posterior left maxillary and left sphenoid sinus, nodular mucosal thickening inferior maxillary sinuses bilaterally, right greater than left. Patient has a screw extending from the anterior inferior body of C2 through a chronic nonunited fracture at the base of the odontoid process. The screw has some lucency around its margins as it extends into the superior aspect of the odontoid process suggesting it may be mildly loosened.  IMPRESSION: 1. There is a hematoma in the paranasal region as well as within the left nostril and there is blood opacifying the anterior and anterior superior aspect of the nasal cavity along the right and left sides of the () anterior superior nasal septum. There is acute mildly comminuted fracture in the left nasal bone with minimal inferior angulation and deviation of a 6 mm distal left nasal bone fracture fragment. 2. Patient has had prior surgical fixation via screw of the base of the odontoid fracture back in 2013 mm and there is a screw extending from the anterior inferior body  of C2 through a nonunited fracture through the base the  odontoid process and there is lucency along the margin of the screw as it extends through the body of C2 and within the odontoid process suggesting the screw may be mildly lucent. There are prominent arthritic changes at the atlantodental articulation.  Results communicated to Dr. Matute in the emergency room by telephone on 12/03/2019 at 9:50 AM.  Radiation dose reduction techniques were utilized, including automated exposure control and exposure modulation based on body size.)  Tests in the medicine section of CPT®: ordered and reviewed (See procedure notes for EKG interpretation.)  Discussion of test results with the performing providers: yes (Dr. Fortune (Radiologist))  Decide to obtain previous medical records or to obtain history from someone other than the patient: yes  Review and summarize past medical records: yes (The patient has no pertinent previous records in Epic.)  Discuss the patient with other providers: yes (SAMI Altamirano ([facility]) and SAMI Mahmood (Neurosurgery))  Independent visualization of images, tracings, or specimens: yes    Patient Progress  Patient progress: stable         DIAGNOSIS  Final diagnoses:   Closed nondisplaced fracture of sixth cervical vertebra, unspecified fracture morphology, initial encounter (CMS/[facility])   Closed fracture of nasal bone, initial encounter   Closed nondisplaced fracture of proximal phalanx of right ring finger, initial encounter   Closed nondisplaced fracture of proximal phalanx of right little finger, initial encounter       DISPOSITION  ADMISSION TO [facility]    Discussed treatment plan and reason for admission with pt/family and admitting physician.  Pt/family voiced understanding of the plan for admission for further testing/treatment as needed.    Latest Documented Vital Signs:  As of 11:12 AM  BP- 147/75 HR- 66 Temp- 97.6 °F (36.4 °C) (Tympanic) O2 sat- 96%    --  Documentation assistance provided by alexandre Kirk for Dr. Giancarlo Matute MD.   Information recorded by the scribe was done at my direction and has been verified and validated by me.     Ananya Kirk  12/03/19 2203       Giancarlo Matute MD  12/03/19 0264

## 2019-12-03 NOTE — ED TRIAGE NOTES
Fall getting back into bed.  Tripped and lost footing.  Hit face on ground.  Bloody nose. No loc, no head injury, no blood thinners

## 2019-12-04 DIAGNOSIS — S12.591D OTHER CLOSED NONDISPLACED FRACTURE OF SIXTH CERVICAL VERTEBRA WITH ROUTINE HEALING, SUBSEQUENT ENCOUNTER: Primary | ICD-10-CM

## 2019-12-04 LAB
ANION GAP SERPL CALCULATED.3IONS-SCNC: 12.7 MMOL/L (ref 5–15)
BASOPHILS # BLD AUTO: 0.02 10*3/MM3 (ref 0–0.2)
BASOPHILS NFR BLD AUTO: 0.4 % (ref 0–1.5)
BUN BLD-MCNC: 16 MG/DL (ref 8–23)
BUN/CREAT SERPL: 24.6 (ref 7–25)
CALCIUM SPEC-SCNC: 8.5 MG/DL (ref 8.2–9.6)
CHLORIDE SERPL-SCNC: 101 MMOL/L (ref 98–107)
CO2 SERPL-SCNC: 24.3 MMOL/L (ref 22–29)
CREAT BLD-MCNC: 0.65 MG/DL (ref 0.57–1)
DEPRECATED RDW RBC AUTO: 52 FL (ref 37–54)
DIGOXIN SERPL-MCNC: 1.5 NG/ML (ref 0.6–1.2)
EOSINOPHIL # BLD AUTO: 0.14 10*3/MM3 (ref 0–0.4)
EOSINOPHIL NFR BLD AUTO: 2.5 % (ref 0.3–6.2)
ERYTHROCYTE [DISTWIDTH] IN BLOOD BY AUTOMATED COUNT: 14.6 % (ref 12.3–15.4)
GFR SERPL CREATININE-BSD FRML MDRD: 86 ML/MIN/1.73
GLUCOSE BLD-MCNC: 94 MG/DL (ref 65–99)
GLUCOSE BLDC GLUCOMTR-MCNC: 129 MG/DL (ref 70–130)
GLUCOSE BLDC GLUCOMTR-MCNC: 138 MG/DL (ref 70–130)
GLUCOSE BLDC GLUCOMTR-MCNC: 163 MG/DL (ref 70–130)
GLUCOSE BLDC GLUCOMTR-MCNC: 96 MG/DL (ref 70–130)
HCT VFR BLD AUTO: 26.2 % (ref 34–46.6)
HGB BLD-MCNC: 8.8 G/DL (ref 12–15.9)
IMM GRANULOCYTES # BLD AUTO: 0.02 10*3/MM3 (ref 0–0.05)
IMM GRANULOCYTES NFR BLD AUTO: 0.4 % (ref 0–0.5)
LYMPHOCYTES # BLD AUTO: 0.84 10*3/MM3 (ref 0.7–3.1)
LYMPHOCYTES NFR BLD AUTO: 14.9 % (ref 19.6–45.3)
MCH RBC QN AUTO: 32.6 PG (ref 26.6–33)
MCHC RBC AUTO-ENTMCNC: 33.6 G/DL (ref 31.5–35.7)
MCV RBC AUTO: 97 FL (ref 79–97)
MONOCYTES # BLD AUTO: 0.47 10*3/MM3 (ref 0.1–0.9)
MONOCYTES NFR BLD AUTO: 8.4 % (ref 5–12)
NEUTROPHILS # BLD AUTO: 4.13 10*3/MM3 (ref 1.7–7)
NEUTROPHILS NFR BLD AUTO: 73.4 % (ref 42.7–76)
NRBC BLD AUTO-RTO: 0 /100 WBC (ref 0–0.2)
PLATELET # BLD AUTO: 190 10*3/MM3 (ref 140–450)
PMV BLD AUTO: 9.8 FL (ref 6–12)
POTASSIUM BLD-SCNC: 3.5 MMOL/L (ref 3.5–5.2)
RBC # BLD AUTO: 2.7 10*6/MM3 (ref 3.77–5.28)
SODIUM BLD-SCNC: 138 MMOL/L (ref 136–145)
WBC NRBC COR # BLD: 5.62 10*3/MM3 (ref 3.4–10.8)

## 2019-12-04 PROCEDURE — 82962 GLUCOSE BLOOD TEST: CPT

## 2019-12-04 PROCEDURE — 99232 SBSQ HOSP IP/OBS MODERATE 35: CPT | Performed by: PHYSICIAN ASSISTANT

## 2019-12-04 PROCEDURE — 97161 PT EVAL LOW COMPLEX 20 MIN: CPT

## 2019-12-04 PROCEDURE — 63710000001 INSULIN GLARGINE PER 5 UNITS: Performed by: HOSPITALIST

## 2019-12-04 PROCEDURE — 80048 BASIC METABOLIC PNL TOTAL CA: CPT | Performed by: NURSE PRACTITIONER

## 2019-12-04 PROCEDURE — 36415 COLL VENOUS BLD VENIPUNCTURE: CPT | Performed by: NURSE PRACTITIONER

## 2019-12-04 PROCEDURE — 85025 COMPLETE CBC W/AUTO DIFF WBC: CPT | Performed by: NURSE PRACTITIONER

## 2019-12-04 PROCEDURE — 63710000001 INSULIN LISPRO (HUMAN) PER 5 UNITS: Performed by: NURSE PRACTITIONER

## 2019-12-04 PROCEDURE — 97110 THERAPEUTIC EXERCISES: CPT

## 2019-12-04 PROCEDURE — 80162 ASSAY OF DIGOXIN TOTAL: CPT | Performed by: HOSPITALIST

## 2019-12-04 RX ORDER — ECHINACEA PURPUREA EXTRACT 125 MG
2 TABLET ORAL 4 TIMES DAILY
Status: DISCONTINUED | OUTPATIENT
Start: 2019-12-04 | End: 2019-12-06 | Stop reason: HOSPADM

## 2019-12-04 RX ADMIN — SALINE NASAL SPRAY 2 SPRAY: 1.5 SOLUTION NASAL at 20:12

## 2019-12-04 RX ADMIN — INSULIN LISPRO 2 UNITS: 100 INJECTION, SOLUTION INTRAVENOUS; SUBCUTANEOUS at 17:41

## 2019-12-04 RX ADMIN — METOPROLOL TARTRATE 50 MG: 50 TABLET, FILM COATED ORAL at 20:12

## 2019-12-04 RX ADMIN — TRAMADOL HYDROCHLORIDE 25 MG: 50 TABLET, FILM COATED ORAL at 20:19

## 2019-12-04 RX ADMIN — SODIUM CHLORIDE 75 ML/HR: 9 INJECTION, SOLUTION INTRAVENOUS at 05:50

## 2019-12-04 RX ADMIN — ATORVASTATIN CALCIUM 20 MG: 20 TABLET, FILM COATED ORAL at 08:47

## 2019-12-04 RX ADMIN — INSULIN GLARGINE 8 UNITS: 100 INJECTION, SOLUTION SUBCUTANEOUS at 21:24

## 2019-12-04 RX ADMIN — SODIUM CHLORIDE, PRESERVATIVE FREE 10 ML: 5 INJECTION INTRAVENOUS at 20:12

## 2019-12-04 RX ADMIN — SODIUM CHLORIDE, PRESERVATIVE FREE 10 ML: 5 INJECTION INTRAVENOUS at 08:48

## 2019-12-04 RX ADMIN — METOPROLOL TARTRATE 50 MG: 50 TABLET, FILM COATED ORAL at 08:47

## 2019-12-04 RX ADMIN — ALLOPURINOL 100 MG: 100 TABLET ORAL at 08:47

## 2019-12-04 NOTE — PROGRESS NOTES
"DAILY PROGRESS NOTE  [facility]    Patient Identification:  Name: Val Jeronimo  Age: 89 y.o.  Sex: female  :  1929  MRN: [id number]         Primary Care Physician: Harlan Arthur MD    Subjective:  Interval History:She complains of pain.    Objective:    Scheduled Meds:  allopurinol 100 mg Oral Daily   atorvastatin 20 mg Oral Daily   insulin glargine 8 Units Subcutaneous Nightly   insulin lispro 0-7 Units Subcutaneous 4x Daily With Meals & Nightly   metoprolol tartrate 50 mg Oral BID   sodium chloride 10 mL Intravenous Q12H     Continuous Infusions:  sodium chloride 75 mL/hr Last Rate: 75 mL/hr (19 0550)       Vital signs in last 24 hours:  Temp:  [97.3 °F (36.3 °C)-98 °F (36.7 °C)] 97.3 °F (36.3 °C)  Heart Rate:  [69-79] 69  Resp:  [18-20] 18  BP: (111-153)/() 153/118    Intake/Output:    Intake/Output Summary (Last 24 hours) at 2019 1655  Last data filed at 2019 0741  Gross per 24 hour   Intake 1220 ml   Output 950 ml   Net 270 ml       Exam:  BP (!) 153/118 (BP Location: Left arm, Patient Position: Lying)   Pulse 69   Temp 97.3 °F (36.3 °C) (Oral)   Resp 18   Ht 160 cm (63\")   Wt 71.9 kg (158 lb 8.2 oz)   SpO2 96%   BMI 28.08 kg/m²     General Appearance:    Alert, cooperative, no distress   Head:    Normocephalic, without obvious abnormality, atraumatic   Eyes:       Throat:   Lips, tongue, gums normal   Neck:   Supple, symmetrical, trachea midline, no JVD   Lungs:     Clear to auscultation bilaterally, respirations unlabored   Chest Wall:    No tenderness or deformity    Heart:    Regular rate and rhythm, S1 and S2 normal, no murmur,no  Rub or gallop   Abdomen:     Soft, non-tender, bowel sounds active, no masses, no organomegaly    Extremities:   Extremities normal, atraumatic, no cyanosis or edema   Pulses:      Skin:   Skin is warm and dry,  no rashes or palpable lesions   Neurologic:   no focal deficits noted      Lab Results (last 72 hours)     " Procedure Component Value Units Date/Time    POC Glucose Once [[id number]]  (Abnormal) Collected:  12/04/19 1630    Specimen:  Blood Updated:  12/04/19 1634     Glucose 163 mg/dL     POC Glucose Once [[id number]]  (Normal) Collected:  12/04/19 1120    Specimen:  Blood Updated:  12/04/19 1125     Glucose 129 mg/dL     POC Glucose Once [[id number]]  (Normal) Collected:  12/04/19 0624    Specimen:  Blood Updated:  12/04/19 0625     Glucose 96 mg/dL     Digoxin Level [[id number]]  (Abnormal) Collected:  12/04/19 0428    Specimen:  Blood from Arm, Left Updated:  12/04/19 0550     Digoxin 1.50 ng/mL     Basic Metabolic Panel [[id number]]  (Normal) Collected:  12/04/19 0428    Specimen:  Blood from Arm, Left Updated:  12/04/19 0544     Glucose 94 mg/dL      BUN 16 mg/dL      Creatinine 0.65 mg/dL      Sodium 138 mmol/L      Potassium 3.5 mmol/L      Chloride 101 mmol/L      CO2 24.3 mmol/L      Calcium 8.5 mg/dL      eGFR Non African Amer 86 mL/min/1.73      BUN/Creatinine Ratio 24.6     Anion Gap 12.7 mmol/L     Narrative:       GFR Normal >60  Chronic Kidney Disease <60  Kidney Failure <15    CBC Auto Differential [[id number]]  (Abnormal) Collected:  12/04/19 0428    Specimen:  Blood from Arm, Left Updated:  12/04/19 0520     WBC 5.62 10*3/mm3      RBC 2.70 10*6/mm3      Hemoglobin 8.8 g/dL      Hematocrit 26.2 %      MCV 97.0 fL      MCH 32.6 pg      MCHC 33.6 g/dL      RDW 14.6 %      RDW-SD 52.0 fl      MPV 9.8 fL      Platelets 190 10*3/mm3      Neutrophil % 73.4 %      Lymphocyte % 14.9 %      Monocyte % 8.4 %      Eosinophil % 2.5 %      Basophil % 0.4 %      Immature Grans % 0.4 %      Neutrophils, Absolute 4.13 10*3/mm3      Lymphocytes, Absolute 0.84 10*3/mm3      Monocytes, Absolute 0.47 10*3/mm3      Eosinophils, Absolute 0.14 10*3/mm3      Basophils, Absolute 0.02 10*3/mm3      Immature Grans, Absolute 0.02 10*3/mm3      nRBC 0.0 /100 WBC     POC Glucose Once [[id number]]  (Abnormal) Collected:  12/03/19 204     Specimen:  Blood Updated:  12/03/19 2045     Glucose 246 mg/dL     POC Glucose Once [[id number]]  (Abnormal) Collected:  12/03/19 1616    Specimen:  Blood Updated:  12/03/19 1618     Glucose 134 mg/dL     POC Glucose Once [[id number]]  (Abnormal) Collected:  12/03/19 1453    Specimen:  Blood Updated:  12/03/19 1453     Glucose 135 mg/dL     Digoxin Level [[id number]]  (Abnormal) Collected:  12/03/19 1038    Specimen:  Blood Updated:  12/03/19 1137     Digoxin 1.50 ng/mL     CBC & Differential [[id number]] Collected:  12/03/19 1038    Specimen:  Blood Updated:  12/03/19 1124    Narrative:       The following orders were created for panel order CBC & Differential.  Procedure                               Abnormality         Status                     ---------                               -----------         ------                     CBC Auto Differential[[id number]]        Abnormal            Final result                 Please view results for these tests on the individual orders.    CBC Auto Differential [[id number]]  (Abnormal) Collected:  12/03/19 1038    Specimen:  Blood Updated:  12/03/19 1124     WBC 7.22 10*3/mm3      RBC 2.99 10*6/mm3      Hemoglobin 10.2 g/dL      Hematocrit 29.8 %      MCV 99.7 fL      MCH 34.1 pg      MCHC 34.2 g/dL      RDW 14.4 %      RDW-SD 52.1 fl      MPV 9.6 fL      Platelets 196 10*3/mm3      Neutrophil % 79.8 %      Lymphocyte % 10.9 %      Monocyte % 6.8 %      Eosinophil % 1.9 %      Basophil % 0.3 %      Immature Grans % 0.3 %      Neutrophils, Absolute 5.76 10*3/mm3      Lymphocytes, Absolute 0.79 10*3/mm3      Monocytes, Absolute 0.49 10*3/mm3      Eosinophils, Absolute 0.14 10*3/mm3      Basophils, Absolute 0.02 10*3/mm3      Immature Grans, Absolute 0.02 10*3/mm3      nRBC 0.0 /100 WBC     Basic Metabolic Panel [[id number]]  (Abnormal) Collected:  12/03/19 1038    Specimen:  Blood Updated:  12/03/19 1108     Glucose 131 mg/dL      BUN 20 mg/dL      Creatinine 0.88 mg/dL       Sodium 137 mmol/L      Potassium 3.8 mmol/L      Chloride 101 mmol/L      CO2 22.5 mmol/L      Calcium 8.9 mg/dL      eGFR Non African Amer 60 mL/min/1.73      BUN/Creatinine Ratio 22.7     Anion Gap 13.5 mmol/L     Narrative:       GFR Normal >60  Chronic Kidney Disease <60  Kidney Failure <15        Data Review:  Results from last 7 days   Lab Units 12/04/19  0428 12/03/19  1038   SODIUM mmol/L 138 137   POTASSIUM mmol/L 3.5 3.8   CHLORIDE mmol/L 101 101   CO2 mmol/L 24.3 22.5   BUN mg/dL 16 20   CREATININE mg/dL 0.65 0.88   GLUCOSE mg/dL 94 131*   CALCIUM mg/dL 8.5 8.9     Results from last 7 days   Lab Units 12/04/19  0428 12/03/19  1038   WBC 10*3/mm3 5.62 7.22   HEMOGLOBIN g/dL 8.8* 10.2*   HEMATOCRIT % 26.2* 29.8*   PLATELETS 10*3/mm3 190 196             Lab Results   Lab Value Date/Time    TROPONINT <0.01 01/27/2014 0350    TROPONINT <0.01 01/26/2014 1948               Invalid input(s): PROT, LABALBU          Glucose   Date/Time Value Ref Range Status   12/04/2019 1630 163 (H) 70 - 130 mg/dL Final   12/04/2019 1120 129 70 - 130 mg/dL Final   12/04/2019 0624 96 70 - 130 mg/dL Final   12/03/2019 2043 246 (H) 70 - 130 mg/dL Final   12/03/2019 1616 134 (H) 70 - 130 mg/dL Final   12/03/2019 1453 135 (H) 70 - 130 mg/dL Final           Past Medical History:   Diagnosis Date   • Arthritis    • Atrial fibrillation (CMS/HCC)    • CHF (congestive heart failure) (CMS/HCC)    • Degeneration of thoracic intervertebral disc    • Diabetes mellitus (CMS/HCC)    • Gout    • H/O Pulmonary nodule 10/2013   • History of anemia 10/2013   • History of cellulitis 2016    Finger of left hand   • Hyperlipidemia    • Hypertension    • Odontoid fracture (CMS/HCC)    • Osteoporosis    • Pacemaker        Assessment:  Active Hospital Problems    Diagnosis  POA   • **Closed nondisplaced fracture of sixth cervical vertebra (CMS/HCC) [S12.501A]  Yes   • Stage 3 chronic kidney disease (CMS/HCC) [N18.3]  Yes   • Anemia [D64.9]  Yes   • History  of pacemaker [Z95.0]  Not Applicable   • Persistent atrial fibrillation [I48.19]  Yes   • Type 2 diabetes mellitus with both eyes affected by moderate nonproliferative retinopathy without macular edema, without long-term current use of insulin (CMS/[facility]) [E11.3393]  Yes   • Essential hypertension [I10]  Yes      Resolved Hospital Problems   No resolved problems to display.       Plan:  Continue current RX. Will need SNU for rehab.    Donny Brooke MD  12/4/2019  4:55 PM

## 2019-12-04 NOTE — PROGRESS NOTES
Discharge Planning Assessment  [facility]     Patient Name: Val Jeronimo  MRN: [id number]  Today's Date: 12/4/2019    Admit Date: 12/3/2019    Discharge Needs Assessment     Row Name 12/04/19 1717       Living Environment    Lives With  alone    Current Living Arrangements  independent/assisted living facility    Duration at Residence  [facility] since Sept 2019    Primary Care Provided by  self    Provides Primary Care For  no one, unable/limited ability to care for self    Family Caregiver if Needed  child(edie), adult    Quality of Family Relationships  helpful;involved;supportive    Able to Return to Prior Arrangements  yes       Resource/Environmental Concerns    Resource/Environmental Concerns  none    Transportation Concerns  car, none       Transition Planning    Patient/Family Anticipates Transition to  home with help/services;inpatient rehabilitation facility    Patient/Family Anticipated Services at Transition  durable medical equipment;home health care;skilled nursing    Transportation Anticipated  car, drives self;family or friend will provide       Discharge Needs Assessment    Readmission Within the Last 30 Days  no previous admission in last 30 days    Concerns to be Addressed  discharge planning;adjustment to diagnosis/illness;basic needs    Equipment Currently Used at Home  walker, rolling    Anticipated Changes Related to Illness  inability to care for self    Discharge Facility/Level of Care Needs  nursing facility, skilled;home with home health    Current Discharge Risk  chronically ill        Discharge Plan     Row Name 12/04/19 1711       Plan    Plan  SNF vs HH- pending dc needs    Provided post acute provider list?  Yes    Post Acute Provider Lists  Nursing Home;Home Health    Post Acuite Provider List  Delivered    Delivered To  Patient;Support Person    Support Person  Savita    Method of Delivery  In person    Patient/Family in Agreement with Plan  yes     Plan Comments  Spoke with pt and daughter Savita Keen [phone] at bedside, introduced self and explained CCP role. Confirmed pt is a resident of Assisted Living at [facility]. Pts apartment is on 1st floor. Prior to admission pt was IADL but does use rollator for long distances to dining room. Pt denies any HH but has been to [place] [facility] about 6 years ago. Pt unsure of dc needs at this time., CCP explained anticipate possible rehab but will await PT eval when able. CCP reviewed SNF/HH List with daughter. CCP will follow. ermias barraza/ccp        Destination      No service coordination in this encounter.      Durable Medical Equipment      No service coordination in this encounter.      Dialysis/Infusion      No service coordination in this encounter.      Home Medical Care      No service coordination in this encounter.      Therapy      No service coordination in this encounter.      Community Resources      No service coordination in this encounter.          Demographic Summary     Row Name 12/04/19 6760       General Information    Admission Type  inpatient    Referral Source  admission list    Reason for Consult  discharge planning    Preferred Language  English     Used During This Interaction  no       Contact Information    Permission Granted to Share Info With  family/designee        Functional Status    No documentation.       Psychosocial    No documentation.       Abuse/Neglect    No documentation.       Legal     Row Name 12/04/19 2714       Financial/Legal    Finance Comments  POA is Haritha Thapa, copy on file.        Substance Abuse    No documentation.       Patient Forms    No documentation.           Maria Teresa Forde RN

## 2019-12-04 NOTE — PLAN OF CARE
Problem: Patient Care Overview  Goal: Plan of Care Review   12/04/19 0934   OTHER   Outcome Summary Pt admitted to hospital yesterday s/p fall with diagnosis of fractures C6 vertebra, facial bone, R rib, and 4th/5th digits of R hand. She did well with physical therapy, only needing CGA to Min A x1 for all mobility activities. She does demonstrate weakness, decreased foot clearance, and slowness during gait. Pt was educated to take short walks to bathroom with nursing and to sit in the chair at least once a day. Nursing was notified of this as well. PT will continue to see her to address deficits in mobility, strength, and endurance. Currently leaning towards recommending home health PT upon D/C pending pt progress in hospital.   Coping/Psychosocial   Plan of Care Reviewed With patient

## 2019-12-04 NOTE — PROGRESS NOTES
"CC:  Denies neck pain, reports R hand pain only related to known 4th/5th digit fractures. Did get up with PT.      Blood pressure 113/62, pulse 72, temperature 98 °F (36.7 °C), temperature source Oral, resp. rate 18, height 160 cm (63\"), weight 71.9 kg (158 lb 8.2 oz), SpO2 96 %.      AA, Ox3  In hard collar  Facial bruising stable  Moving all ext well  Lungs effort normal      ..  Results from last 7 days   Lab Units 12/04/19  0428   WBC 10*3/mm3 5.62   HEMOGLOBIN g/dL 8.8*   HEMATOCRIT % 26.2*   PLATELETS 10*3/mm3 190       ..  Results from last 7 days   Lab Units 12/04/19  0428   SODIUM mmol/L 138   POTASSIUM mmol/L 3.5   CHLORIDE mmol/L 101   CO2 mmol/L 24.3   BUN mg/dL 16   CREATININE mg/dL 0.65   GLUCOSE mg/dL 94   CALCIUM mg/dL 8.5       S/p fall with C6 fracture  Hx of fall 2013 ago with odontoid fracture/odontoid screw at that time by Dr Wood  Rib and finger fractures    Can go home or to rehab at anytime. Must stay in the hard collar at all times other than while bathing. To follow up in 3-4wks with new XR, office to schedule.  Call sooner with concerns or increased pain or weakness.We will S/O.   "

## 2019-12-04 NOTE — PLAN OF CARE
Problem: Patient Care Overview  Goal: Plan of Care Review  Outcome: Ongoing (interventions implemented as appropriate)      Problem: Patient Care Overview  Goal: Plan of Care Review  Outcome: Ongoing (interventions implemented as appropriate)   12/04/19 0506   OTHER   Outcome Summary vital signs stable. no neuro changes. aspen collar in place. await ENT eval. [facility] following. up to [facility] without issue. IVF infusing. ultram given x 1 with + results. will continue to monitor.    Plan of Care Review   Progress no change   Coping/Psychosocial   Plan of Care Reviewed With patient

## 2019-12-04 NOTE — CONSULTS
[facility]   ENT CONSULT  2019      Patient Identification:  Name: Val Jeronimo  Age: 89 y.o.  Sex: female  :  1929  MRN: [id number]                     Date of Admission: 12/3/2019      CC:  Facial trauma      HPI: 89-year-old woman fell in her assisted living unit.  She felt somewhat dizzy and hit her face.  No loss of consciousness before or after the fall.  She sustained some other orthopedic injuries including evaluation of her cervical spine.  She has nasal stuffiness and congestion.  The nose feels sore.  No change in her hearing.  No change in her dental occlusion.    ROS:  Review of Systems - History obtained from chart review and the patient     Past Medical History:  Past Medical History:   Diagnosis Date   • Arthritis    • Atrial fibrillation (CMS/HCC)    • CHF (congestive heart failure) (CMS/HCC)    • Degeneration of thoracic intervertebral disc    • Diabetes mellitus (CMS/HCC)    • Gout    • H/O Pulmonary nodule 10/2013   • History of anemia 10/2013   • History of cellulitis 2016    Finger of left hand   • Hyperlipidemia    • Hypertension    • Odontoid fracture (CMS/HCC)    • Osteoporosis    • Pacemaker        Past Surgical History:  Past Surgical History:   Procedure Laterality Date   • NECK SURGERY  10/2013    Broken neck repair   • PACEMAKER IMPLANTATION         Social History:  Social History     Socioeconomic History   • Marital status:      Spouse name: Noah   • Number of children: 5   • Years of education: High School   • Highest education level: Not on file   Occupational History   • Occupation: Homemaker     Employer: RETIRED   Tobacco Use   • Smoking status: Never Smoker   • Smokeless tobacco: Never Used   Substance and Sexual Activity   • Alcohol use: Yes     Alcohol/week: 0.6 oz     Types: 1 Glasses of wine per week     Frequency: Monthly or less     Drinks per session: 1 or 2     Comment: occ/caffeine use   • Drug use: No   • Sexual activity: Defer    Lifestyle   • Physical activity:     Days per week: 0 days     Minutes per session: 0 min   • Stress: Not at all   Relationships   • Social connections:     Talks on phone: More than three times a week     Gets together: More than three times a week     Attends [employer] service: 1 to 4 times per year     Active member of club or organization: Yes     Attends meetings of clubs or organizations: 1 to 4 times per year     Relationship status:        Family History:  Family History   Problem Relation Age of Onset   • Heart disease Father    • Kidney cancer Sister 61        Renal cell carcinoma   • Leukemia Son 44        CML   • Colon cancer Daughter 64   • COPD Sister         non smoker       Home Meds:  Medications Prior to Admission   Medication Sig Dispense Refill Last Dose   • allopurinol (ZYLOPRIM) 100 MG tablet Take 1 tablet by mouth Daily for 180 days. 90 tablet 1    • aspirin 81 MG tablet Take by mouth daily.   Taking   • atorvastatin (LIPITOR) 20 MG tablet Take 1 tablet by mouth Daily for 180 days. 90 tablet 1    • digoxin (LANOXIN) 125 MCG tablet TAKE 1 TABLET BY MOUTH ONCE DAILY 90 tablet 3 Taking   • furosemide (LASIX) 40 MG tablet TAKE 1 & 1/2 (ONE & ONE-HALF) TABLETS BY MOUTH ONCE DAILY 135 tablet 3 Taking   • hydrALAZINE (APRESOLINE) 25 MG tablet TAKE ONE TABLET BY MOUTH THREE TIMES DAILY 90 tablet 11 Taking   • irbesartan (AVAPRO) 300 MG tablet Take 1 tablet by mouth every night at bedtime for 180 days. 90 tablet 1    • metFORMIN (GLUCOPHAGE) 500 MG tablet Take 1 tablet by mouth 2 (Two) Times a Day With Meals for 180 days. 180 tablet 1    • metoprolol tartrate (LOPRESSOR) 50 MG tablet Take 1 tablet by mouth 2 (Two) Times a Day for 180 days. 180 tablet 1    • pioglitazone (ACTOS) 30 MG tablet Take 1 tablet by mouth Daily for 180 days. 90 tablet 1    • potassium chloride (MICRO-K) 10 MEQ CR capsule Take 20 mEq by mouth Daily.      • vitamin B-12 (CYANOCOBALAMIN) 1000 MCG tablet Take 1,000 mcg by  mouth Daily.   Taking       Allergies:  Allergies   Allergen Reactions   • Advil [Ibuprofen] Unknown - Low Severity     .       Immunization Status:  Immunization History   Administered Date(s) Administered   • Flu Mist 10/14/2015   • Fluzone High Dose =>65 Years ([employer] ONLY) 09/27/2017, 11/14/2018   • Influenza TIV (IM) 10/18/2016   • PPD Test 08/06/2019   • Pneumococcal Conjugate 13-Valent (PCV13) 09/09/2015, 11/15/2017   • Pneumococcal, Unspecified 10/10/2011   • Shingrix 10/14/2019   • Tdap 10/27/2016   • Zostavax 11/24/2015         PE:   Temp:  [97.3 °F (36.3 °C)-98 °F (36.7 °C)] 97.3 °F (36.3 °C)  Heart Rate:  [69-79] 69  Resp:  [18-20] 18  BP: (111-153)/() 153/118     Body mass index is 28.08 kg/m².      General appearance: oriented to person, place, and time.  She is sitting upright in bed with a hard cervical collar.  There is moderate periorbital ecchymosis and mild facial ecchymosis.  No proptosis or orbital swelling.  Ability to Communicate: normal means of communication, clear voice, normal hearing  Ears - ceruminosis noted.  Nasal exam -external nose with mild swelling and tenderness on palpation.  External nose relatively straight.  Septum has mild deviation and there is no septal hematoma.  Both nasal cavities have dried crusts of blood.  No active bleeding.  There is mild soft tissue swelling of the nasal aperture.  Oropharyngeal exam - mucous membranes moist, pharynx normal without lesions, tonsils normal, dental hygiene good and edentulous.  Neck exam - not examined.  Neurological exam reveals cranial nerves II through XII intact.      MEDICATIONS     Current Facility-Administered Medications   Medication Dose Route Frequency Provider Last Rate Last Dose   • acetaminophen (TYLENOL) tablet 650 mg  650 mg Oral Q4H PRN Kaitlyn Coates APRN       • allopurinol (ZYLOPRIM) tablet 100 mg  100 mg Oral Daily Adolfo Grant MD   100 mg at 12/04/19 0847   • atorvastatin (LIPITOR) tablet 20 mg   20 mg Oral Daily Adolfo Grant MD   20 mg at 12/04/19 0847   • dextrose (D50W) 25 g/ 50mL Intravenous Solution 25 g  25 g Intravenous Q15 Min PRN Kaitlyn Coates APRN       • dextrose (GLUTOSE) oral gel 15 g  15 g Oral Q15 Min PRN Kaitlyn Coates APRN       • glucagon (human recombinant) (GLUCAGEN DIAGNOSTIC) injection 1 mg  1 mg Subcutaneous Q15 Min PRN Kaitlyn Coates APRN       • insulin glargine (LANTUS) injection 8 Units  8 Units Subcutaneous Nightly Adolfo Grant MD   8 Units at 12/03/19 2321   • insulin lispro (humaLOG) injection 0-7 Units  0-7 Units Subcutaneous 4x Daily With Meals & Nightly Kaitlyn Coates APRN   3 Units at 12/03/19 2134   • metoprolol tartrate (LOPRESSOR) tablet 50 mg  50 mg Oral BID Adolfo Grant MD   50 mg at 12/04/19 0847   • nitroglycerin (NITROSTAT) SL tablet 0.4 mg  0.4 mg Sublingual Q5 Min PRN Kaitlyn Coates APRN       • sodium chloride 0.9 % flush 10 mL  10 mL Intravenous PRN Giancarlo Matute MD       • sodium chloride 0.9 % flush 10 mL  10 mL Intravenous Q12H Kaitlyn Coates APRN   10 mL at 12/04/19 0848   • sodium chloride 0.9 % flush 10 mL  10 mL Intravenous PRN Kaitlyn Coates APRN       • traMADol (ULTRAM) tablet 25 mg  25 mg Oral Q6H PRN Adolfo Grant MD   25 mg at 12/03/19 2222         DATA         Intake/Output Summary (Last 24 hours) at 12/4/2019 1729  Last data filed at 12/4/2019 0741  Gross per 24 hour   Intake 1220 ml   Output 950 ml   Net 270 ml       Radiology review: Facial CT reviewed and noted.  Mild nasal fracture noted with minimal displacement.  Soft tissue swelling noted around the face with blood in the nose and sinus spaces.        Imaging Results (All)     Procedure Component Value Units Date/Time    CT Head Without Contrast [[id number]] Collected:  12/03/19 1011     Updated:  12/03/19 1133    Narrative:       CLINICAL HISTORY: Patient fell face first, hit her nose, has nasal  swelling, epistaxis and headache.     HEAD  CT TECHNIQUE: Spiral CT images were obtained from the base of the  skull to the vertex without intravenous contrast. Images were  reformatted and submitted in 3 mm thick axial CT sections with brain  algorithm, 2 mm thick axial CT sections with high-resolution bone  algorithm, 2 mm thick sagittal and coronal reconstructions were  performed and submitted in brain algorithm.     COMPARISON: This is correlated to a prior head CT from [facility] on 10/26/2013.     FINDINGS: There is prominent patchy and confluent low density in the  periventricular and subcortical white matter of the cerebral hemispheres  consistent with moderate to severe small vessel disease. There are 3-4  mm rounded calcified subependymal nodules along the subependymal region  of the posterior lateral bodies of the lateral ventricles bilaterally as  well as in the lateral aspect of the trigone of the right lateral  ventricle measuring 6 x 4 mm and in the temporal horn of the right  lateral ventricle measuring 7 x 6 mm. These are unchanged. Ventricles  are normal in size. There is no mass effect and no midline shift and no  extraaxial fluid collections are identified and there is no evidence of  acute intracranial hemorrhage. There are multiple small nodular areas of  sclerosis in the superior frontal parietal bones and posterior occipital  bones. 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 No acute skull fracture is  seen. There is extensive soft tissue swelling in the paranasal soft  tissues. No acute skull fracture is identified. Hairline lucencies in  the right and left nasal bones are suspicious for hairline nondisplaced  nasal bone fractures. There is mild mucosal thickening in the frontal  recesses, the anterior ethmoid sinuses, inferior maxillary sinus. There  is a small amount of fluid in  the posterior left maxillary and sphenoid  sinus. The mastoid and middle ear cavities are clear.       Impression:       1. Since prior head CT 10/26/2013 there has been facial trauma with a  paranasal hematoma and hairline lucencies in the nasal bone suspicious  for hairline nondisplaced nasal bone fractures. There is some blood in  the anterior nasal cavity. There is also mild bilateral frontal recess  anterior ethmoid and inferior maxillary sinus mucosal thickening with a  tiny amount of fluid in the posterior left maxillary and sphenoid  sinuses that is new. The remainder of the head CT is unchanged.  2. There is moderate to severe small vessel disease in the cerebral  white matter. There are at least 4 separate calcified subependymal  nodules including rounded 3-4 mm calcified nodules in the lateral  subependymal region and the posterior bodies of the lateral ventricles  bilaterally and a 6 x 4 mm focus in the lateral trigone of the right  lateral ventricle and a 7 x 6 mm calcified subependymal nodule in the  temporal horn of the right lateral ventricle.  3. 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 The remainder  of the head CT is normal with no acute skull fracture or intracranial  hemorrhage identified     FACIAL CT TECHNIQUE: Spiral CT images were obtained through the facial  bones without intravenous contrast in axial imaging plane. Images were  reformatted and are submitted in 2 mm thick axial CT sections with soft  tissue algorithm and 1 mm thick axial, sagittal and coronal CT sections  with high-resolution bone algorithm.      COMPARISON: This is correlated to a prior cervical spine CT 10/26/2013  as well as 10/31/2013.     FINDINGS: There is a hematoma in the paranasal region [] trauma.  Minimally comminuted fracture of the anterior left nasal bone on  sagittal reconstructed images 72 through 74. There appears to be some  minimal inferior angulation of a 6 mm distal left nasal bone fracture  fragment. There is a hematoma in the left nostril. There is mild mucosal  thickening of the frontal recesses, anterior ethmoid sinus, a small  amount of fluid in the posterior left maxillary and left sphenoid sinus,  nodular mucosal thickening inferior maxillary sinuses bilaterally, right  greater than left. Patient has a screw extending from the anterior  inferior body of C2 through a chronic nonunited fracture at the base of  the odontoid process. The screw has some lucency around its margins as  it extends into the superior aspect of the odontoid process suggesting  it may be mildly loosened.     IMPRESSION:  1. There is a hematoma in the paranasal region as well as within the  left nostril and there is blood opacifying the anterior and anterior  superior aspect of the nasal cavity along the right and left sides of  the anterior and anterior superior aspect of the nasal septum. There is  an acute mildly comminuted fracture of the left nasal bone with minimal  inferior angulation and deviation of a 6 mm distal left nasal bone  fracture fragment.  2. Patient has had prior surgical fixation via screw of the base of the  odontoid fracture back in 2013 and there is a screw extending from the  anterior inferior body  of C2 through a nonunited fracture through the  base the odontoid process and there is lucency along the margin of the  screw as it extends through the body of C2 and within the odontoid  process suggesting the screw may be mildly loose. There are prominent  arthritic changes at the atlantodental articulation.      Results communicated to  Dr. Matute in the emergency room by telephone  on 12/03/2019 at 9:50 AM.     Radiation dose reduction techniques were utilized, including automated  exposure control and exposure modulation based on body size.     This report was finalized on 12/3/2019 11:30 AM by Dr. Pablo Fortune M.D.       CT Facial Bones Without Contrast [[id number]] Collected:  12/03/19 1011     Updated:  12/03/19 1133    Narrative:       CLINICAL HISTORY: Patient fell face first, hit her nose, has nasal  swelling, epistaxis and headache.     HEAD CT TECHNIQUE: Spiral CT images were obtained from the base of the  skull to the vertex without intravenous contrast. Images were  reformatted and submitted in 3 mm thick axial CT sections with brain  algorithm, 2 mm thick axial CT sections with high-resolution bone  algorithm, 2 mm thick sagittal and coronal reconstructions were  performed and submitted in brain algorithm.     COMPARISON: This is correlated to a prior head CT from [facility] on 10/26/2013.     FINDINGS: There is prominent patchy and confluent low density in the  periventricular and subcortical white matter of the cerebral hemispheres  consistent with moderate to severe small vessel disease. There are 3-4  mm rounded calcified subependymal nodules along the subependymal region  of the posterior lateral bodies of the lateral ventricles bilaterally as  well as in the lateral aspect of the trigone of the right lateral  ventricle measuring 6 x 4 mm and in the temporal horn of the right  lateral ventricle measuring 7 x 6 mm. These are unchanged. Ventricles  are normal in size. There is no mass effect and no midline shift and no  extraaxial fluid collections are identified and there is no evidence of  acute intracranial hemorrhage. There are multiple small nodular areas of  sclerosis in the superior frontal parietal bones and posterior occipital  bones. 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 No acute skull fracture is  seen. There is extensive soft tissue swelling in the paranasal soft  tissues. No acute skull fracture is identified. Hairline lucencies in  the right and left nasal bones are suspicious for hairline nondisplaced  nasal bone fractures. There is mild mucosal thickening in the frontal  recesses, the anterior ethmoid sinuses, inferior maxillary sinus. There  is a small amount of fluid in the posterior left maxillary and sphenoid  sinus. The mastoid and middle ear cavities are clear.       Impression:       1. Since prior head CT 10/26/2013 there has been facial trauma with a  paranasal hematoma and hairline lucencies in the nasal bone suspicious  for hairline nondisplaced nasal bone fractures. There is some blood in  the anterior nasal cavity. There is also mild bilateral frontal recess  anterior ethmoid and inferior maxillary sinus mucosal thickening with a  tiny amount of fluid in the posterior left maxillary and sphenoid  sinuses that is new. The remainder of the head CT is unchanged.  2. There is moderate to severe small vessel disease in the cerebral  white matter. There are at least 4 separate calcified subependymal  nodules including rounded 3-4 mm calcified nodules in the lateral  subependymal region and the posterior bodies of the lateral ventricles  bilaterally and a 6 x 4 mm focus in the lateral trigone of the right  lateral ventricle and a 7 x 6 mm calcified subependymal nodule in the  temporal horn of the right lateral ventricle.  3. 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 The remainder  of the head CT is normal with no acute skull fracture or intracranial  hemorrhage identified     FACIAL CT TECHNIQUE: Spiral CT images were obtained through the facial  bones without intravenous contrast in axial imaging plane. Images were  reformatted and are submitted in 2 mm thick axial CT sections with soft  tissue algorithm and 1 mm thick axial, sagittal and coronal CT sections  with high-resolution bone algorithm.     COMPARISON: This is correlated to a prior cervical spine CT 10/26/2013  as well as 10/31/2013.     FINDINGS: There is a hematoma in the paranasal region [] trauma.  Minimally comminuted fracture of the anterior left nasal bone on  sagittal reconstructed images 72 through 74. There appears to be some  minimal inferior angulation of a 6 mm distal left nasal bone fracture  fragment. There is a hematoma in the left nostril. There is mild mucosal  thickening of the frontal recesses, anterior ethmoid sinus, a small  amount of fluid in the posterior left maxillary and left sphenoid sinus,  nodular mucosal thickening inferior maxillary sinuses bilaterally, right  greater than left. Patient has a screw extending from the anterior  inferior body of C2 through a chronic nonunited fracture at the base of  the odontoid process. The screw has some lucency around its margins as  it extends into the superior aspect of the odontoid process suggesting  it may be mildly loosened.     IMPRESSION:  1. There is a hematoma in the paranasal region as well as within the  left nostril and there is blood opacifying the anterior and anterior  superior aspect of the nasal cavity along the right and left sides of  the anterior and anterior superior aspect of the nasal septum. There is  an acute mildly comminuted fracture of the left nasal bone with minimal  inferior angulation and deviation of a 6 mm distal left nasal  bone  fracture fragment.  2. Patient has had prior surgical fixation via screw of the base of the  odontoid fracture back in 2013 and there is a screw extending from the  anterior inferior body  of C2 through a nonunited fracture through the  base the odontoid process and there is lucency along the margin of the  screw as it extends through the body of C2 and within the odontoid  process suggesting the screw may be mildly loose. There are prominent  arthritic changes at the atlantodental articulation.      Results communicated to Dr. Matute in the emergency room by telephone  on 12/03/2019 at 9:50 AM.     Radiation dose reduction techniques were utilized, including automated  exposure control and exposure modulation based on body size.     This report was finalized on 12/3/2019 11:30 AM by Dr. Pablo Fortune M.D.       CT Cervical Spine Without Contrast [[id number]] Collected:  12/03/19 1100     Updated:  12/03/19 1131    Narrative:       EMERGENCY NONCONTRAST CT SCAN OF THE CERVICAL SPINE 12/03/2019     CLINICAL HISTORY: Fell, facial trauma and has neck pain.     TECHNIQUE: Spiral CT images were obtained from the skull base down to  T2-3 thoracic level and images were reformatted and submitted in 2 mm  thick axial, sagittal, and coronal CT sections with soft tissue  algorithm and 1 mm thick axial, sagittal and coronal CT sections with  high-resolution bone algorithm.     This is correlated to prior cervical spine CTs from [facility] on 10/26/2013 and 10/31/2013.     FINDINGS: Back in 10/2013, the patient had a screw placed from the  anterior inferior cortex of the body of C2 through a  fracture through  the base of the odontoid process with the superior aspect of the screw  in the superior tip of the odontoid. There is a 2 mm wide chronic  nonunited fracture gap at the base of the odontoid process.  There is  some loosening along the margins of the screw within the body of C2 and  the odontoid  suggesting that the screw is slightly loosened.  There are  prominent arthritic changes at the atlantodental interval. There is some  new bone formation anterior to the screw and anterior aspect of the  superior body of C2 and the base of the odontoid within the prevertebral  soft tissues. The ring of C1 is intact.  The remainder of C2 vertebra is  intact. At C2-3,  the posterior disc margin and facets are normal with  no canal or foraminal narrowing.     At C3-4, there is mild to moderate bilateral facet overgrowth, a 2 mm  retrolisthesis of C3 on C4, diffuse posterior disc osteophyte complex  abuts and mildly deforms the ventral surface of the cord mildly to  moderately narrowing the canal.  There is bilateral uncovertebral joint  hypertrophy and there is moderate bilateral bony foraminal narrowing.     At C4-5, there is diffuse posterior disc osteophyte complex.  There is  mild canal narrowing, there is mild to moderate bilateral facet  overgrowth, bilateral uncovertebral joint hypertrophy.  There is mild  left and mild to moderate right bony foraminal narrowing.     At C5-6, there is disc space narrowing and degenerative endplate  changes, posterior endplate spurring and uncovertebral joint hypertrophy  and mild bilateral facet overgrowth. There is mild canal and there is  mild to moderate left and moderate right foraminal narrowing.     At C6-7, there is a cleft through the anterior inferior corner of the C6  vertebra.  There is a  sharp horizontal lucent cleft that is compatible  with an acute fracture through the far anterior-inferior corner of the  C6 vertebra, extends the fracture plane and extends into the anterior  aspect of this C6-7 disc space.  This is new when compared to prior cervical spine CT 10/31/2013. It is  felt to be an acute fracture, may be from an extension injury. There is  mild posterior endplate spurring and uncovertebral joint hypertrophy and  there is mild canal and there is mild to  moderate bilateral bony  foraminal narrowing.     At C7-T1, there is mild to moderate right and there is moderate to  severe left facet overgrowth, a 2-3 mm degenerative anterolisthesis of  C7 on T1.  There is no canal narrowing, there is mild foraminal  narrowing.       Impression:       1. There is an acute fracture through the far anterior inferior corner  of the C6 vertebra extending into the anterior aspect of the C6-7 disc  space. No additional acute fractures are seen in the cervical spine and  may consider a cervical spine MRI to further characterize the ligaments  in the cervical spine at this level.  2. Patient back in 10/2013 had a screw placed from the anterior inferior  cortex of the C2 vertebra across the base of odontoid fracture and tip  of the screws in the superior aspect of the odontoid.  There is a  chronic 2 mm wide nonunited fracture cleft through the base of the  odontoid.  There is some lucency along the margin of the screw within  the body of C2 and in the odontoid suggesting the screw may be mildly  lucent.  3. Diffuse cervical spondylosis as described.  4. Multiple small nodular areas of sclerosis in the cervical spine;  there are also sclerotic lesions in the facial bones and calvarium.   These have been evaluated by bone scan in the past and are not hot on  bone scan.  The patient does have calcified subependymal nodules.  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      The results were communicated to Dr. Matute in our emergency room by  telephone 12/03/2019.      Radiation dose reduction techniques were utilized, including automated  exposure control and exposure modulation based on body size.     This report was finalized on 12/3/2019 11:28 AM by Dr. Pablo Fortune M.D.       XR Hand 3+ View Right [[id number]] Collected:  12/03/19 0859     Updated:  12/03/19  1053    Narrative:       XR HAND 3+ VIEW RIGHT-  12/03/2019     HISTORY: Fell, hand injury.     FINDINGS: There is a mildly displaced fracture of the proximal aspect of  the proximal phalanx of the right 5th finger. The fracture does not  appear to involve the 5th metacarpophalangeal joint.     There also is a nondisplaced or minimally displaced fracture of the  proximal aspect of the proximal phalanx of the right 4th finger.     Bones are demineralized. There are degenerative changes of the  interphalangeal joints.       Impression:       Fractures of the proximal phalanges of the right 4th and 5th fingers as  described.     This report was finalized on 12/3/2019 10:50 AM by Dr. Cruzito Sanchez M.D.               IMPRESSION     Closed nondisplaced fracture of sixth cervical vertebra (CMS/HCC)    Type 2 diabetes mellitus with both eyes affected by moderate nonproliferative retinopathy without macular edema, without long-term current use of insulin (CMS/HCC)    Essential hypertension    Persistent atrial fibrillation    History of pacemaker    Anemia    Stage 3 chronic kidney disease (CMS/HCC)       She has a closed nasal fracture with minimal displacement.  There is considerable nasal swelling which will take time to resolve.  Head elevation will help.  She should start using saline sprays 3 or 4 times a day to soften up crusts.  No surgical treatment is required.       PLAN   Follow-up as needed.     Esvin Herndon MD  12/4/2019  5:29 PM

## 2019-12-04 NOTE — PLAN OF CARE
"Problem: Fall Risk (Adult)  Goal: Identify Related Risk Factors and Signs and Symptoms  Outcome: Outcome(s) achieved Date Met: 12/04/19    Goal: Absence of Fall  Outcome: Ongoing (interventions implemented as appropriate)      Problem: Patient Care Overview  Goal: Plan of Care Review  Outcome: Ongoing (interventions implemented as appropriate)   12/04/19 1800   OTHER   Outcome Summary Patient resting today, no reports of pain, \"just sore.\" No pain medication requested or administered. Swelling in Nare, Periorbital, L and R hands. Ice offered and declined. Questions regarding accuchecks, expected course discussed with family. VSS.    Plan of Care Review   Progress improving   Coping/Psychosocial   Plan of Care Reviewed With patient;family       Problem: Skin Injury Risk (Adult)  Goal: Identify Related Risk Factors and Signs and Symptoms  Outcome: Outcome(s) achieved Date Met: 12/04/19    Goal: Skin Health and Integrity  Outcome: Ongoing (interventions implemented as appropriate)        "

## 2019-12-04 NOTE — THERAPY EVALUATION
Patient Name: Val Jeronimo  : 1929    MRN: [id number]                              Today's Date: 2019       Admit Date: 12/3/2019    Visit Dx:     ICD-10-CM ICD-9-CM   1. Closed nondisplaced fracture of sixth cervical vertebra, unspecified fracture morphology, initial encounter (CMS/[facility]) S12.501A 805.06   2. Closed fracture of nasal bone, initial encounter S02.2XXA 802.0   3. Closed nondisplaced fracture of proximal phalanx of right ring finger, initial encounter S62.644A 816.01   4. Closed nondisplaced fracture of proximal phalanx of right little finger, initial encounter S62.646A 816.01     Patient Active Problem List   Diagnosis   • Type 2 diabetes mellitus with both eyes affected by moderate nonproliferative retinopathy without macular edema, without long-term current use of insulin (CMS/[facility])   • Essential hypertension   • Persistent atrial fibrillation   • History of pacemaker   • Other hyperlipidemia   • Osteoporosis   • Hand arthritis   • Idiopathic chronic gout of multiple sites without tophus   • Anemia   • Abrasion of right lower extremity   • Stage 3 chronic kidney disease (CMS/[facility])   • Osteopoikilosis   • Closed nondisplaced fracture of sixth cervical vertebra (CMS/[facility])     Past Medical History:   Diagnosis Date   • Arthritis    • Atrial fibrillation (CMS/[facility])    • CHF (congestive heart failure) (CMS/[facility])    • Degeneration of thoracic intervertebral disc    • Diabetes mellitus (CMS/[facility])    • Gout    • H/O Pulmonary nodule 10/2013   • History of anemia 10/2013   • History of cellulitis 2016    Finger of left hand   • Hyperlipidemia    • Hypertension    • Odontoid fracture (CMS/[facility])    • Osteoporosis    • Pacemaker      Past Surgical History:   Procedure Laterality Date   • NECK SURGERY  10/2013    Broken neck repair   • PACEMAKER IMPLANTATION       General Information     Row Name 19 0905          PT Evaluation Time/Intention    Document Type  evaluation  -MD (r) SP (t) MD (c)      Mode of Treatment  physical therapy  -MD ADAMS (r) (codi) MD (manjit)     Row Name 12/04/19 0905          General Information    Patient Profile Reviewed?  yes  -MD ADAMS evans (r)) MD chin)     Prior Level of Function  min assist:;all household mobility;ADL's;mod assist:;community mobility  -MD ADAMS evans (r)) MD (manjit)     Existing Precautions/Restrictions  fall;pacemaker  -MD ADAMS evans (r)) MD (c)     Row Name 12/04/19 0905          Relationship/Environment    Lives With  alone  -MD ADAMS evans (r)) MD chin)     Row Name 12/04/19 0905          Resource/Environmental Concerns    Current Living Arrangements  independent/assisted living facility  -MD ADAMS evans (r)) MD (manjit)     Row Name 12/04/19 0905          Home Main Entrance    Number of Stairs, Main Entrance  none  -MD ADAMS evans (r)) MD chin)     Row Name 12/04/19 0905          Stairs Within Home, Primary    Number of Stairs, Within Home, Primary  none  -MD ADAMS evans (r)) MD (manjit)     Row Name 12/04/19 0905          Cognitive Assessment/Intervention- PT/OT    Orientation Status (Cognition)  oriented x 3  -MD ADAMS evans (r)) MD (c)     Row Name 12/04/19 0905          Safety Issues, Functional Mobility    Impairments Affecting Function (Mobility)  balance;endurance/activity tolerance;strength;pain  -MD ADAMS evans (r)) MD (manjit)       User Key  (r) = Recorded By, (t) = Taken By, (c) = Cosigned By    Initials Name Provider Type    Rebecca Christianson, PT Physical Therapist    Snow Rebolledo, PT Student PT Student        Mobility     Row Name 12/04/19 0920          Bed Mobility Assessment/Treatment    Bed Mobility Assessment/Treatment  supine-sit;sit-supine  -MD ADAMS evans (r)) MD (manjit)     Supine-Sit [place] (Bed Mobility)  supervision  -MD ADAMS chin (r t))     Sit-Supine [place] (Bed Mobility)  supervision  -MD ADAMS chin (r t))     Assistive Device (Bed Mobility)  head of bed elevated  -MD ADAMS evans (r)) MD chin)     Row Name 12/04/19 0920          Transfer Assessment/Treatment    Comment (Transfers)  3 attempts to successfully complete  sit-to-stand transfer  -MD (r) SP (t) MD (c)     Row Name 12/04/19 0920          Bed-Chair Transfer    Bed-Chair [place] (Transfers)  not tested  -MD (r) SP (t) MD (c)     Row Name 12/04/19 0920          Sit-Stand Transfer    Sit-Stand [place] (Transfers)  minimum assist (75% patient effort)  -MD (r) SP (t) MD (c)     Assistive Device (Sit-Stand Transfers)  walker, front-wheeled  -MD (r) SP (t) MD (c)     Row Name 12/04/19 0920          Gait/Stairs Assessment/Training    Gait/Stairs Assessment/Training  gait/ambulation assistive device  -MD (r) SP (t) MD (c)     [place] Level (Gait)  contact guard;verbal cues;nonverbal cues (demo/gesture)  -MD (r) SP (t) MD (c)     Assistive Device (Gait)  walker, front-wheeled  -MD (r) SP (t) MD (c)     Distance in Feet (Gait)  20 feet  -MD (r) SP (t) MD (c)     Pattern (Gait)  step-through  -MD (r) SP (t) MD (c)     Deviations/Abnormal Patterns (Gait)  kaylen decreased;festinating/shuffling;stride length decreased;gait speed decreased  -MD (r) SP (t) MD (c)     Bilateral Gait Deviations  forward flexed posture  -MD (r) SP (t) MD (c)     Price Level (Stairs)  not tested  -MD (r) SP (t) MD (c)     Comment (Gait/Stairs)  Assistance with turning walker  -MD (r) SP (t) MD (c)       User Key  (r) = Recorded By, (t) = Taken By, (c) = Cosigned By    Initials Name Provider Type    Rebecca Christianson, PT Physical Therapist    Snow Rebolledo, PT Student PT Student        Obj/Interventions     Row Name 12/04/19 0923          General ROM    GENERAL ROM COMMENTS  WFL except neck and R hand  -MD (r) SP (t) MD (c)     Row Name 12/04/19 0923          MMT (Manual Muscle Testing)    General MMT Comments  WFL  -MD (r) SP (t) MD (c)     Row Name 12/04/19 0923          Therapeutic Exercise    Lower Extremity (Therapeutic Exercise)  LAQ (long arc quad), bilateral;marching while seated  -MD (r) SP (t) MD (c)     Lower Extremity Range of Motion (Therapeutic Exercise)  ankle  dorsiflexion/plantar flexion, bilateral  -MD (r) SP (t) MD (c)     Position (Therapeutic Exercise)  seated  -MD (r) SP (t) MD (c)     Sets/Reps (Therapeutic Exercise)  x10 reps  -MD (r) SP (t) MD (c)     Row Name 12/04/19 0923          Static Sitting Balance    Level of [place] (Unsupported Sitting, Static Balance)  supervision  -MD (r) SP (t) MD (c)     Sitting Position (Unsupported Sitting, Static Balance)  sitting on edge of bed  -MD (r) SP (t) MD (c)     Time Able to Maintain Position (Unsupported Sitting, Static Balance)  45 to 60 seconds  -MD (r) SP (t) MD (c)     Row Name 12/04/19 0923          Static Standing Balance    Level of [place] (Supported Standing, Static Balance)  contact guard assist  -MD (r) SP (t) MD (c)     Time Able to Maintain Position (Supported Standing, Static Balance)  15 to 30 seconds  -MD (r) SP (t) MD (c)     Assistive Device Utilized (Supported Standing, Static Balance)  walker, rolling  -MD (r) SP (t) MD (c)       User Key  (r) = Recorded By, (t) = Taken By, (c) = Cosigned By    Initials Name Provider Type    Rebecca Christianson, PT Physical Therapist    Snow Rebolledo, PT Student PT Student        Goals/Plan     Row Name 12/04/19 0918          Bed Mobility Goal 1 (PT)    Activity/Assistive Device (Bed Mobility Goal 1, PT)  sit to supine/supine to sit  -MD (r) SP (t) MD (c)     [place] Level/Cues Needed (Bed Mobility Goal 1, PT)  supervision required  -MD (r) ADAMS (t) MD (c)     Time Frame (Bed Mobility Goal 1, PT)  1 week  -MD (r) ADAMS (t) MD (c)     Row Name 12/04/19 0918          Transfer Goal 1 (PT)    Activity/Assistive Device (Transfer Goal 1, PT)  sit-to-stand/stand-to-sit;bed-to-chair/chair-to-bed;toilet;walker, rolling  -MD (r) SP (t) MD (c)     [place] Level/Cues Needed (Transfer Goal 1, PT)  standby assist  -MD [place]wood) ADAMS (t) MD chin)     Time Frame (Transfer Goal 1, PT)  1 week  -MD [place]wood) ADAMS (t) MD (manjit)     Row Name 12/04/19 0918          Gait Training Goal 1 (PT)     Activity/Assistive Device (Gait Training Goal 1, PT)  gait (walking locomotion);walker, rolling  -MD ADAMS evans (r)) MD (manjit)     [place] Level (Gait Training Goal 1, PT)  standby assist  -MD ADAMS evans (r)) MD chin)     Distance (Gait Goal 1, PT)  50 feet  -MD ADAMS evans (r)) MD (manjit)     Time Frame (Gait Training Goal 1, PT)  1 week  -MD ADAMS evans (r)) MD (manjit)       User Key  (r) = Recorded By, (t) = Taken By, (c) = Cosigned By    Initials Name Provider Type    Rebecca Christianson, PT Physical Therapist    Snow Rebolledo, PT Student PT Student        Clinical Impression     Row Name 12/04/19 0908          Pain Assessment    Additional Documentation  Pain Scale: Numbers Pre/Post-Treatment (Group)  -MD ADAMS evans (r)) MD (manjit)     Row Name 12/04/19 0908          Pain Scale: Numbers Pre/Post-Treatment    Pain Scale: Numbers, Pretreatment  0/10 - no pain  -MD ADAMS evans (r)) MD (manjit)     Row Name 12/04/19 0908          Plan of Care Review    Plan of Care Reviewed With  patient;daughter  -MD ADAMS chin (r t))     Row Name 12/04/19 0908          Physical Therapy Clinical Impression    Criteria for Skilled Interventions Met (PT Clinical Impression)  yes  -MD ADAMS evans (r)) MD chin)     Rehab Potential (PT Clinical Summary)  good, to achieve stated therapy goals  -MD ADAMS evans (r)) MD (manjit)     Predicted Duration of Therapy (PT)  1 week  -MD ADAMS evans (r)) MD chin)     Row Name 12/04/19 0908          Vital Signs    Pre SpO2 (%)  98  -MD ADAMS evans (r)) MD (c)     O2 Delivery Pre Treatment  room air  -MD ADAMS evans (r)) MD (manjit)     O2 Delivery Intra Treatment  room air  -MD ADAMS evans (r)) MD (manjit)     Post SpO2 (%)  98  -MD ADAMS evans (r)) MD (manjit)     O2 Delivery Post Treatment  room air  -MD ADAMS evans (r)) MD (manjit)     Pre Patient Position  Supine  -MD ADAMS evans (r)) MD chin)     Row Name 12/04/19 0908          Positioning and Restraints    Pre-Treatment Position  in bed  -MD ADAMS evans (r)) MD (manjit)     Post Treatment Position  bed  -MD (r) SP (t) MD (c)     In Bed  supine;call light within reach;encouraged to call  for assist;exit alarm on;with family/caregiver;side rails up x2  -MD (r) ADAMS (t) MD (c)       User Key  (r) = Recorded By, (t) = Taken By, (c) = Cosigned By    Initials Name Provider Type    Rebecca Christianson, PT Physical Therapist    Snow Rebolledo, PT Student PT Student        Outcome Measures     Row Name 12/04/19 0919          How much help from another person do you currently need...    Turning from your back to your side while in flat bed without using bedrails?  3  -MD (efrain) ADAMS (t) MD (c)     Moving from lying on back to sitting on the side of a flat bed without bedrails?  3  -MD [place]wood) ADAMS (codi) MD (c)     Moving to and from a bed to a chair (including a wheelchair)?  3  -MD [place]wood) ADAMS evans) MD chin)     Standing up from a chair using your arms (e.g., wheelchair, bedside chair)?  3  -MD [place]wood) ADAMS (codi) MD chin)     Climbing 3-5 steps with a railing?  2  -MD [place]wood) ADAMS (codi) MD (manjit)     To walk in hospital room?  3  -MD (efrain) ADAMS (t) MD (manjit)     AM-PAC 6 Clicks Score (PT)  17  -MD ADAMS (r) (t)     Row Name 12/04/19 0919          Functional Assessment    Outcome Measure Options  AM-PAC 6 Clicks Basic Mobility (PT)  -MD [place]wood) ADAMS (codi) MD (manjit)       User Key  (r) = Recorded By, (t) = Taken By, (c) = Cosigned By    Initials Name Provider Type    Rebecca Christianson, PT Physical Therapist    Snow Rebolledo, PT Student PT Student        Physical Therapy Education     Title: PT OT SLP Therapies (In Progress)     Topic: Physical Therapy (In Progress)     Point: Mobility training (Done)     Learning Progress Summary           Patient Acceptance, E, VU by SP at 12/4/2019  9:11 AM   Family Acceptance, E, VU by SP at 12/4/2019  9:11 AM                   Point: Precautions (Done)     Learning Progress Summary           Patient Acceptance, E, VU by SP at 12/4/2019  9:11 AM   Family Acceptance, E, VU by SP at 12/4/2019  9:11 AM                               User Key     Initials Effective Dates Name Provider Type Discipline    SP 10/11/19 -  Snow Tavarez, PT Student PT  Student PT              PT Recommendation and Plan  Planned Therapy Interventions (PT Eval): balance training, bed mobility training, gait training, patient/family education, strengthening, transfer training  Outcome Summary/Treatment Plan (PT)  Anticipated Discharge Disposition (PT): assisted living facility ([facility]), skilled nursing facility  Plan of Care Reviewed With: patient  Outcome Summary: Pt admitted to hospital yesterday s/p fall with diagnosis of fractures C6 vertebra, facial bone, R rib, and 4th/5th digits of R hand. She did well with physical therapy, only needing CGA to Min A x1 for all mobility activities. She does demonstrate weakness and decreased foot clearance during gait. PT will continue to see this patient to address deficits in mobility, strength, and endurance. Currently leaning towards recommending home health PT upon D/C pending pt progress in hospital.     Time Calculation:   PT Charges     Row Name 12/04/19 0929             Time Calculation    Start Time  0904  -MD (r) SP (t) MD (c)      Stop Time  0925  -MD (efrain) ADAMS (t) MD (c)      Time Calculation (min)  21 min  -MD (efrain) SP (t)      PT Received On  12/04/19  -MD [place]wood) ADAMS (t) MD (c)      PT - Next Appointment  12/05/19  -MD [place]wood) ADAMS (codi) MD (c)      PT Goal Re-Cert Due Date  12/11/19  -MD [place]wood) ADAMS (t) MD (c)        User Key  (r) = Recorded By, (t) = Taken By, (c) = Cosigned By    Initials Name Provider Type    Rebecca Christianosn, PT Physical Therapist    Snow Rebolledo, PT Student PT Student        Therapy Charges for Today     Code Description Service Date Service Provider Modifiers Qty    72833645603 HC PT EVAL LOW COMPLEXITY 2 12/4/2019 Snow Tavarez, PT Student GP 1    11399823750 HC PT THER PROC EA 15 MIN 12/4/2019 Snow Tavarez, PT Student GP 1    [id number] HC PT THER SUPP EA 15 MIN 12/4/2019 Snow Tavarez, PT Student GP 1          PT G-Codes  Outcome Measure Options: AM-PAC 6 Clicks Basic Mobility (PT)  AM-PAC 6 Clicks Score (PT): Nicolette Adamson  [place], PT Student  12/4/2019

## 2019-12-05 LAB
ANION GAP SERPL CALCULATED.3IONS-SCNC: 12.6 MMOL/L (ref 5–15)
BASOPHILS # BLD AUTO: 0.01 10*3/MM3 (ref 0–0.2)
BASOPHILS NFR BLD AUTO: 0.2 % (ref 0–1.5)
BUN BLD-MCNC: 14 MG/DL (ref 8–23)
BUN/CREAT SERPL: 19.2 (ref 7–25)
CALCIUM SPEC-SCNC: 8.5 MG/DL (ref 8.2–9.6)
CHLORIDE SERPL-SCNC: 101 MMOL/L (ref 98–107)
CO2 SERPL-SCNC: 23.4 MMOL/L (ref 22–29)
CREAT BLD-MCNC: 0.73 MG/DL (ref 0.57–1)
DEPRECATED RDW RBC AUTO: 52.9 FL (ref 37–54)
EOSINOPHIL # BLD AUTO: 0.13 10*3/MM3 (ref 0–0.4)
EOSINOPHIL NFR BLD AUTO: 2.2 % (ref 0.3–6.2)
ERYTHROCYTE [DISTWIDTH] IN BLOOD BY AUTOMATED COUNT: 14.6 % (ref 12.3–15.4)
GFR SERPL CREATININE-BSD FRML MDRD: 75 ML/MIN/1.73
GLUCOSE BLD-MCNC: 104 MG/DL (ref 65–99)
GLUCOSE BLDC GLUCOMTR-MCNC: 113 MG/DL (ref 70–130)
GLUCOSE BLDC GLUCOMTR-MCNC: 117 MG/DL (ref 70–130)
GLUCOSE BLDC GLUCOMTR-MCNC: 192 MG/DL (ref 70–130)
GLUCOSE BLDC GLUCOMTR-MCNC: 194 MG/DL (ref 70–130)
HCT VFR BLD AUTO: 27.5 % (ref 34–46.6)
HGB BLD-MCNC: 9.1 G/DL (ref 12–15.9)
IMM GRANULOCYTES # BLD AUTO: 0.02 10*3/MM3 (ref 0–0.05)
IMM GRANULOCYTES NFR BLD AUTO: 0.3 % (ref 0–0.5)
LYMPHOCYTES # BLD AUTO: 0.96 10*3/MM3 (ref 0.7–3.1)
LYMPHOCYTES NFR BLD AUTO: 16 % (ref 19.6–45.3)
MCH RBC QN AUTO: 32.4 PG (ref 26.6–33)
MCHC RBC AUTO-ENTMCNC: 33.1 G/DL (ref 31.5–35.7)
MCV RBC AUTO: 97.9 FL (ref 79–97)
MONOCYTES # BLD AUTO: 0.54 10*3/MM3 (ref 0.1–0.9)
MONOCYTES NFR BLD AUTO: 9 % (ref 5–12)
NEUTROPHILS # BLD AUTO: 4.33 10*3/MM3 (ref 1.7–7)
NEUTROPHILS NFR BLD AUTO: 72.3 % (ref 42.7–76)
NRBC BLD AUTO-RTO: 0 /100 WBC (ref 0–0.2)
PLATELET # BLD AUTO: 203 10*3/MM3 (ref 140–450)
PMV BLD AUTO: 9.8 FL (ref 6–12)
POTASSIUM BLD-SCNC: 3.9 MMOL/L (ref 3.5–5.2)
RBC # BLD AUTO: 2.81 10*6/MM3 (ref 3.77–5.28)
SODIUM BLD-SCNC: 137 MMOL/L (ref 136–145)
WBC NRBC COR # BLD: 5.99 10*3/MM3 (ref 3.4–10.8)

## 2019-12-05 PROCEDURE — 63710000001 INSULIN GLARGINE PER 5 UNITS: Performed by: HOSPITALIST

## 2019-12-05 PROCEDURE — 63710000001 INSULIN LISPRO (HUMAN) PER 5 UNITS: Performed by: NURSE PRACTITIONER

## 2019-12-05 PROCEDURE — 97110 THERAPEUTIC EXERCISES: CPT

## 2019-12-05 PROCEDURE — 85025 COMPLETE CBC W/AUTO DIFF WBC: CPT | Performed by: HOSPITALIST

## 2019-12-05 PROCEDURE — 82962 GLUCOSE BLOOD TEST: CPT

## 2019-12-05 PROCEDURE — 80048 BASIC METABOLIC PNL TOTAL CA: CPT | Performed by: HOSPITALIST

## 2019-12-05 RX ADMIN — SALINE NASAL SPRAY 2 SPRAY: 1.5 SOLUTION NASAL at 20:39

## 2019-12-05 RX ADMIN — INSULIN GLARGINE 8 UNITS: 100 INJECTION, SOLUTION SUBCUTANEOUS at 21:21

## 2019-12-05 RX ADMIN — SALINE NASAL SPRAY 2 SPRAY: 1.5 SOLUTION NASAL at 08:01

## 2019-12-05 RX ADMIN — METOPROLOL TARTRATE 50 MG: 50 TABLET, FILM COATED ORAL at 08:00

## 2019-12-05 RX ADMIN — ALLOPURINOL 100 MG: 100 TABLET ORAL at 08:00

## 2019-12-05 RX ADMIN — SALINE NASAL SPRAY 2 SPRAY: 1.5 SOLUTION NASAL at 17:45

## 2019-12-05 RX ADMIN — SALINE NASAL SPRAY 2 SPRAY: 1.5 SOLUTION NASAL at 11:24

## 2019-12-05 RX ADMIN — METOPROLOL TARTRATE 50 MG: 50 TABLET, FILM COATED ORAL at 20:38

## 2019-12-05 RX ADMIN — INSULIN LISPRO 2 UNITS: 100 INJECTION, SOLUTION INTRAVENOUS; SUBCUTANEOUS at 21:21

## 2019-12-05 RX ADMIN — TRAMADOL HYDROCHLORIDE 25 MG: 50 TABLET, FILM COATED ORAL at 17:45

## 2019-12-05 RX ADMIN — SODIUM CHLORIDE, PRESERVATIVE FREE 10 ML: 5 INJECTION INTRAVENOUS at 20:38

## 2019-12-05 RX ADMIN — ATORVASTATIN CALCIUM 20 MG: 20 TABLET, FILM COATED ORAL at 08:00

## 2019-12-05 RX ADMIN — TRAMADOL HYDROCHLORIDE 25 MG: 50 TABLET, FILM COATED ORAL at 10:42

## 2019-12-05 RX ADMIN — INSULIN LISPRO 2 UNITS: 100 INJECTION, SOLUTION INTRAVENOUS; SUBCUTANEOUS at 11:24

## 2019-12-05 RX ADMIN — SODIUM CHLORIDE, PRESERVATIVE FREE 10 ML: 5 INJECTION INTRAVENOUS at 08:02

## 2019-12-05 NOTE — THERAPY TREATMENT NOTE
Patient Name: Val Jeronimo  : 1929    MRN: [id number]                              Today's Date: 2019       Admit Date: 12/3/2019    Visit Dx:     ICD-10-CM ICD-9-CM   1. Closed nondisplaced fracture of sixth cervical vertebra, unspecified fracture morphology, initial encounter (CMS/[facility]) S12.501A 805.06   2. Closed fracture of nasal bone, initial encounter S02.2XXA 802.0   3. Closed nondisplaced fracture of proximal phalanx of right ring finger, initial encounter S62.644A 816.01   4. Closed nondisplaced fracture of proximal phalanx of right little finger, initial encounter S62.646A 816.01     Patient Active Problem List   Diagnosis   • Type 2 diabetes mellitus with both eyes affected by moderate nonproliferative retinopathy without macular edema, without long-term current use of insulin (CMS/[facility])   • Essential hypertension   • Persistent atrial fibrillation   • History of pacemaker   • Other hyperlipidemia   • Osteoporosis   • Hand arthritis   • Idiopathic chronic gout of multiple sites without tophus   • Anemia   • Abrasion of right lower extremity   • Stage 3 chronic kidney disease (CMS/[facility])   • Osteopoikilosis   • Closed nondisplaced fracture of sixth cervical vertebra (CMS/[facility])     Past Medical History:   Diagnosis Date   • Arthritis    • Atrial fibrillation (CMS/[facility])    • CHF (congestive heart failure) (CMS/[facility])    • Degeneration of thoracic intervertebral disc    • Diabetes mellitus (CMS/[facility])    • Gout    • H/O Pulmonary nodule 10/2013   • History of anemia 10/2013   • History of cellulitis 2016    Finger of left hand   • Hyperlipidemia    • Hypertension    • Odontoid fracture (CMS/[facility])    • Osteoporosis    • Pacemaker      Past Surgical History:   Procedure Laterality Date   • NECK SURGERY  10/2013    Broken neck repair   • PACEMAKER IMPLANTATION       General Information     Row Name 19 1252          PT Evaluation Time/Intention    Document Type  therapy note (daily note)  -MD (r) SP  (codi) MD (c)     Mode of Treatment  physical therapy  -MD (r) ADAMS (codi) MD (c)     Row Name 12/05/19 1252          General Information    Patient Profile Reviewed?  yes  -MD norwood) ADAMS evans) MD (manjit)     Existing Precautions/Restrictions  fall;pacemaker  -MD ADAMS evans (r)) MD (manjit)     Barriers to Rehab  none identified  -MD ADAMS (r) (codi) MD (c)     Row Name 12/05/19 1252          Cognitive Assessment/Intervention- PT/OT    Orientation Status (Cognition)  oriented x 3  -MD ADAMS (r) (codi) MD (c)     Row Name 12/05/19 1252          Safety Issues, Functional Mobility    Impairments Affecting Function (Mobility)  balance;strength;pain;endurance/activity tolerance  -MD norwood) ADAMS (codi) MD (manjit)       User Key  (r) = Recorded By, (t) = Taken By, (c) = Cosigned By    Initials Name Provider Type    Rebecca Christianson, PT Physical Therapist    Snow Rebolledo, PT Student PT Student        Mobility     Row Name 12/05/19 1254          Bed Mobility Assessment/Treatment    Bed Mobility Assessment/Treatment  supine-sit;sit-supine  -MD ADAMS evans (r)) MD (c)     Supine-Sit [place] (Bed Mobility)  supervision  -MD ADAMS evans (r)) MD chin)     Sit-Supine [place] (Bed Mobility)  moderate assist (50% patient effort)  -MD ADAMS evans (r)) MD chin)     Assistive Device (Bed Mobility)  bed rails;head of bed elevated  -MD ADAMS evans (r)) MD (manjit)     Row Name 12/05/19 1254          Transfer Assessment/Treatment    Comment (Transfers)  2 attempts to come to full standing  -MD ADMAS evasn (r)) MD (c)     Row Name 12/05/19 1254          Bed-Chair Transfer    Bed-Chair [place] (Transfers)  not tested  -MD ADAMS evans (r)) MD (c)     Row Name 12/05/19 1254          Sit-Stand Transfer    Sit-Stand [place] (Transfers)  contact guard  -MD ADAMS evans (r)) MD (c)     Assistive Device (Sit-Stand Transfers)  walker, front-wheeled  -MD ADAMS evans (r)) MD (c)     Row Name 12/05/19 1254          Gait/Stairs Assessment/Training    Gait/Stairs Assessment/Training  gait/ambulation assistive device  -MD (r) SP (t) MD (c)      [place] Level (Gait)  contact guard;verbal cues;nonverbal cues (demo/gesture)  -MD (r) SP (t) MD (c)     Assistive Device (Gait)  walker, front-wheeled  -MD (r) SP (t) MD (c)     Distance in Feet (Gait)  20 feet  -MD (r) SP (t) MD (c)     Pattern (Gait)  step-through  -MD (r) SP (t) MD (c)     Deviations/Abnormal Patterns (Gait)  stride length decreased;kaylen decreased;gait speed decreased  -MD (r) SP (t) MD (c)     Bilateral Gait Deviations  forward flexed posture  -MD (r) SP (t) MD (c)     [place] Level (Stairs)  not tested  -MD (r) SP (t) MD (c)       User Key  (r) = Recorded By, (t) = Taken By, (c) = Cosigned By    Initials Name Provider Type    Rebecca Christianson, PT Physical Therapist    Snow Rebolledo, PT Student PT Student        Obj/Interventions     Row Name 12/05/19 1259          Static Sitting Balance    Level of [place] (Unsupported Sitting, Static Balance)  supervision  -MD (r) SP (t) MD (c)     Sitting Position (Unsupported Sitting, Static Balance)  sitting on edge of bed  -MD (r) SP (t) MD (c)     Time Able to Maintain Position (Unsupported Sitting, Static Balance)  1 to 2 minutes  -MD (r) SP (t) MD (c)       User Key  (r) = Recorded By, (t) = Taken By, (c) = Cosigned By    Initials Name Provider Type    Rebecca Christianson, PT Physical Therapist    Snow Rebolledo, PT Student PT Student        Goals/Plan    No documentation.       Clinical Impression     Row Name 12/05/19 1300          Pain Scale: Numbers Pre/Post-Treatment    Pre/Post Treatment Pain Comment  Pt reports she is hurting all over body, especially in hand and back of head; she did not give number, stating it was different depending on location  -MD (r) SP (t) MD (c)     Pain Intervention(s)  Ambulation/increased activity;Repositioned  -MD (efrain) ADAMS (t) MD (c)     Row Name 12/05/19 1300          Plan of Care Review    Plan of Care Reviewed With  patient;daughter  -MD norwood) ADAMS (codi) MD (c)     Row Name 12/05/19 1300          Vital  Signs    Pre SpO2 (%)  98  -MD ADAMS (r) (codi) MD (manjit)     O2 Delivery Pre Treatment  room air  -MD ADAMS evans (r)) MD (manjit)     O2 Delivery Intra Treatment  room air  -MD ADAMS evans (r)) MD (manjit)     O2 Delivery Post Treatment  room air  -MD ADAMS evans (r)) MD (manjit)     Row Name 12/05/19 1300          Positioning and Restraints    Pre-Treatment Position  in bed  -MD ADAMS evans (r)) MD (manjit)     Post Treatment Position  bed  -MD ADAMS evans (r)) MD (manjit)     In Bed  fowlers;notified nsg;call light within reach;encouraged to call for assist;with family/caregiver;side rails up x2  -MD ADAMS (r) (codi) MD (manjit)       User Key  (r) = Recorded By, (t) = Taken By, (c) = Cosigned By    Initials Name Provider Type    Rebecca Christianson, PT Physical Therapist    Snow Rebolledo, PT Student PT Student        Outcome Measures     Row Name 12/05/19 1303          How much help from another person do you currently need...    Turning from your back to your side while in flat bed without using bedrails?  3  -MD ADAMS evans (r)) MD (manjit)     Moving from lying on back to sitting on the side of a flat bed without bedrails?  3  -MD ADAMS chin (r t))     Moving to and from a bed to a chair (including a wheelchair)?  3  -MD ADAMS chin (r t))     Standing up from a chair using your arms (e.g., wheelchair, bedside chair)?  3  -MD ADAMS chin (r t))     Climbing 3-5 steps with a railing?  3  -MD ADAMS evans (r)) MD (manjit)     To walk in hospital room?  3  -MD ADAMS evans (r)) MD chin)     AM-PAC 6 Clicks Score (PT)  18  -MD ADAMS evans (r))     Row Name 12/05/19 1303          Functional Assessment    Outcome Measure Options  AM-PAC 6 Clicks Basic Mobility (PT)  -MD ADAMS evans (r)) MD chin)       User Key  (r) = Recorded By, (t) = Taken By, (c) = Cosigned By    Initials Name Provider Type    Rebecca Christianson, PT Physical Therapist    Snow Rebolledo, PT Student PT Student        Physical Therapy Education     Title: PT OT SLP Therapies (In Progress)     Topic: Physical Therapy (In Progress)     Point: Mobility training (Done)      Learning Progress Summary           Patient Acceptance, E, VU by SP at 12/5/2019  1:04 PM    Acceptance, E, VU by SP at 12/4/2019  9:11 AM   Family Acceptance, E, VU by SP at 12/5/2019  1:04 PM    Acceptance, E, VU by SP at 12/4/2019  9:11 AM                   Point: Precautions (Done)     Learning Progress Summary           Patient Acceptance, E, VU by SP at 12/5/2019  1:04 PM    Acceptance, E, VU by SP at 12/4/2019  9:11 AM   Family Acceptance, E, VU by SP at 12/5/2019  1:04 PM    Acceptance, E, VU by SP at 12/4/2019  9:11 AM                               User Key     Initials Effective Dates Name Provider Type Discipline    SP 10/11/19 -  Snow Tavarez, LANA Student PT Student PT              PT Recommendation and Plan  Planned Therapy Interventions (PT Eval): balance training, bed mobility training, gait training, patient/family education, strengthening, transfer training  Outcome Summary/Treatment Plan (PT)  Anticipated Discharge Disposition (PT): assisted living facility ([facility]), home with home health  Plan of Care Reviewed With: (P) patient  Progress: (P) no change  Outcome Summary: (P) Pt did well with PT once again today. She did have increased c/o pain and soreness all over her body and reported she was very tired. She had significant swelling in R hand and some bruising along metacarpal heads of L hand, nursing notified. PT will see pt again tomorrow to continue with functional mobility and strength training. Still recommending pt recieve home health PT services upon D/C to address strength, mobility, and decrease fall risk.     Time Calculation:   PT Charges     Row Name 12/05/19 1315             Time Calculation    Start Time  1147  -MD norwood) ADAMS (codi) MD (c)      Stop Time  1206  -MD norwood) ADAMS (codi) MD (c)      Time Calculation (min)  19 min  -MD norwood) ADAMS (t)      PT Received On  12/05/19  -MD ADAMS evans (r)) MD (c)      PT - Next Appointment  12/06/19  -MD ADAMS evans (r)) MD (c)      PT Goal Re-Cert Due Date  12/12/19  -MD norwood)  SP (t) MD (c)        User Key  (r) = Recorded By, (t) = Taken By, (c) = Cosigned By    Initials Name Provider Type    MD Alexandr, Rebecca, PT Physical Therapist    Snow Rebolledo, PT Student PT Student        Therapy Charges for Today     Code Description Service Date Service Provider Modifiers Qty    03655287811 HC PT EVAL LOW COMPLEXITY 2 12/4/2019 Snow Tavarez, PT Student GP 1    66063992700 HC PT THER PROC EA 15 MIN 12/4/2019 Snow Tavarez, PT Student GP 1    24973908387 HC PT THER SUPP EA 15 MIN 12/4/2019 Snow Tavarez, PT Student GP 1    21121642642 HC PT THER PROC EA 15 MIN 12/5/2019 Snow Tavarez, PT Student GP 1          PT G-Codes  Outcome Measure Options: AM-PAC 6 Clicks Basic Mobility (PT)  AM-PAC 6 Clicks Score (PT): 18    Snow Tavarez PT Student  12/5/2019

## 2019-12-05 NOTE — PLAN OF CARE
Problem: Patient Care Overview  Goal: Plan of Care Review   12/05/19 1314   OTHER   Outcome Summary Pt did well with PT once again today. She did have increased c/o pain and soreness all over her body and reported she was very tired. She had significant swelling in R hand and some bruising along metacarpal heads of L hand, nursing notified. PT will see pt again tomorrow to continue with functional mobility and strength training. Still recommending pt recieve home health PT services upon D/C to address strength, mobility, and decrease fall risk.   Plan of Care Review   Progress no change   Coping/Psychosocial   Plan of Care Reviewed With patient

## 2019-12-05 NOTE — PLAN OF CARE
"Problem: Fall Risk (Adult)  Goal: Absence of Fall  Outcome: Ongoing (interventions implemented as appropriate)      Problem: Patient Care Overview  Goal: Plan of Care Review  Outcome: Ongoing (interventions implemented as appropriate)   12/05/19 1511   OTHER   Outcome Summary Patient complaining of more pain, soreness, and swelling today. Still reports \"Not pain so much as very sore.\" Treated as needed. VSS.    Plan of Care Review   Progress no change   Coping/Psychosocial   Plan of Care Reviewed With patient       Problem: Skin Injury Risk (Adult)  Goal: Skin Health and Integrity  Outcome: Ongoing (interventions implemented as appropriate)      Problem: Nutrition, Imbalanced: Inadequate Oral Intake (Adult)  Goal: Identify Related Risk Factors and Signs and Symptoms  Outcome: Outcome(s) achieved Date Met: 12/05/19    Goal: Improved Oral Intake  Outcome: Ongoing (interventions implemented as appropriate)    Goal: Prevent Further Weight Loss  Outcome: Ongoing (interventions implemented as appropriate)        "

## 2019-12-05 NOTE — PLAN OF CARE
Problem: Fall Risk (Adult)  Goal: Absence of Fall  Outcome: Ongoing (interventions implemented as appropriate)      Problem: Patient Care Overview  Goal: Plan of Care Review  Outcome: Ongoing (interventions implemented as appropriate)   12/05/19 5532   OTHER   Outcome Summary VSS. 2L oxygen applied to patient overnight d/t desating into the mid 80's while sleeping. PRN Tramadol given for pain control. C-Collar in place. Ambulating with assistance. Will continue to monitor.    Plan of Care Review   Progress improving   Coping/Psychosocial   Plan of Care Reviewed With patient     Goal: Individualization and Mutuality  Outcome: Ongoing (interventions implemented as appropriate)      Problem: Skin Injury Risk (Adult)  Goal: Skin Health and Integrity  Outcome: Ongoing (interventions implemented as appropriate)

## 2019-12-05 NOTE — CONSULTS
Adult Nutrition  Assessment/PES    Patient Name:  Val Jeronimo  YOB: 1929  MRN: [id number]  Admit Date:  12/3/2019    Assessment Date:  12/5/2019    Comments:  Nutrition screen completed. Pt tolerating diet with fair po intake. Boost plus TID provided to supplement po. Good po encouraged and food pref's obtained.    Reason for Assessment     Row Name 12/05/19 0808          Reason for Assessment    Reason For Assessment  diagnosis/disease state     Diagnosis  -- neck fx, DM, HTN, CKD, AFIB         Nutrition/Diet History     Row Name 12/05/19 0814          Nutrition/Diet History    Typical Food/Fluid Intake  fair po at this time     Supplemental Drinks/Foods/Additives  will drink and likes davie         Anthropometrics     Row Name 12/05/19 0808          Body Mass Index (BMI)    BMI Assessment  BMI 18.5-24.9: normal         Labs/Tests/Procedures/Meds     Row Name 12/05/19 0808          Labs/Procedures/Meds    Lab Results Reviewed  reviewed     Lab Results Comments  H/H        Diagnostic Tests/Procedures    Diagnostic Test/Procedure Reviewed  reviewed        Medications    Pertinent Medications Reviewed  reviewed     Pertinent Medications Comments  insulin         Physical Findings     Row Name 12/05/19 0809          Physical Findings    Overall Physical Appearance  -- Neck brace     Skin  -- bruised, ecchymotic           Nutrition Prescription Ordered     Row Name 12/05/19 0811          Nutrition Prescription PO    Current PO Diet  Soft Texture     Texture  Whole foods     Supplement  Boost Glucose Control (Glucerna Shake)     Supplement Frequency  3 times a day     Common Modifiers  Consistent Carbohydrate d/c'ed the Renal restrictions         Evaluation of Received Nutrient/Fluid Intake     Row Name 12/05/19 0814          PO Evaluation    % PO Intake  fair               Problem/Interventions:  Problem 1     Row Name 12/05/19 0815          Nutrition Diagnoses Problem 1    Problem 1  Predicted  Suboptimal Intake     Etiology (related to)  Medical Diagnosis     Ortho  Fracture     Signs/Symptoms (evidenced by)  PO Intake     Percent (%) intake recorded  50 %               Intervention Goal     Row Name 12/05/19 0815          Intervention Goal    General  Maintain nutrition     PO  Increase intake;PO intake (%)     PO Intake %  75 %     Weight  Maintain weight         Nutrition Intervention     Row Name 12/05/19 0815          Nutrition Intervention    RD/Tech Action  Follow Tx progress;Care plan reviewd;Supplement provided;Interview for preference;Encourage intake           Education/Evaluation     Row Name 12/05/19 0815          Education    Education  Will Instruct as appropriate        Monitor/Evaluation    Monitor  Per protocol           Electronically signed by:  Verito Pavon RD  12/05/19 8:16 AM

## 2019-12-05 NOTE — PROGRESS NOTES
Continued Stay Note  [facility]     Patient Name: aVl Jeronimo  MRN: [id number]  Today's Date: 12/5/2019    Admit Date: 12/3/2019    Discharge Plan     Row Name 12/05/19 1619       Plan    Plan  Return back to [facility] with therapy or SNF-    Patient/Family in Agreement with Plan  other (see comments)    Plan Comments  Recieved vm from daughter in law Christina Jeronimo that [place] should have therapy at [facility]. CCP called [place] and left vm for RAFAEL Nunez [phone] for available needs if pt able to return there or need SNF. ermias barraza/ccp        Discharge Codes    No documentation.             Maria Teresa Forde, RN

## 2019-12-05 NOTE — PROGRESS NOTES
"DAILY PROGRESS NOTE  [facility]    Patient Identification:  Name: Val Jeronimo  Age: 89 y.o.  Sex: female  :  1929  MRN: [id number]         Primary Care Physician: Harlan Arthur MD    Subjective:  Interval History:She complains of pain.    Objective:    Scheduled Meds:    allopurinol 100 mg Oral Daily   atorvastatin 20 mg Oral Daily   insulin glargine 8 Units Subcutaneous Nightly   insulin lispro 0-7 Units Subcutaneous 4x Daily With Meals & Nightly   metoprolol tartrate 50 mg Oral BID   sodium chloride 10 mL Intravenous Q12H   sodium chloride 2 spray Each Nare 4x Daily     Continuous Infusions:     Vital signs in last 24 hours:  Temp:  [97.3 °F (36.3 °C)-98.3 °F (36.8 °C)] 97.4 °F (36.3 °C)  Heart Rate:  [69-79] 78  Resp:  [16-18] 18  BP: (123-153)/() 146/81    Intake/Output:    Intake/Output Summary (Last 24 hours) at 2019 1144  Last data filed at 2019 0807  Gross per 24 hour   Intake 360 ml   Output --   Net 360 ml       Exam:  /81 (BP Location: Right arm, Patient Position: Lying)   Pulse 78   Temp 97.4 °F (36.3 °C) (Oral)   Resp 18   Ht 160 cm (63\")   Wt 71.9 kg (158 lb 8.2 oz)   SpO2 95%   BMI 28.08 kg/m²     General Appearance:    Alert, cooperative, no distress   Head:    Normocephalic, without obvious abnormality,contusion to face   Eyes:       Throat:   Lips, tongue, gums normal   Neck:   Supple, symmetrical, trachea midline, no JVD   Lungs:     Clear to auscultation bilaterally, respirations unlabored   Chest Wall:    No tenderness or deformity    Heart:    Regular rate and rhythm, S1 and S2 normal, no murmur,no  Rub or gallop   Abdomen:     Soft, non-tender, bowel sounds active, no masses, no organomegaly    Extremities:   Extremities normal, atraumatic, no cyanosis or edema   Pulses:      Skin:   Skin is warm and dry,  no rashes or palpable lesions   Neurologic:   no focal deficits noted      Lab Results (last 72 hours)     Procedure Component " Value Units Date/Time    POC Glucose Once [[id number]]  (Abnormal) Collected:  12/04/19 1630    Specimen:  Blood Updated:  12/04/19 1634     Glucose 163 mg/dL     POC Glucose Once [[id number]]  (Normal) Collected:  12/04/19 1120    Specimen:  Blood Updated:  12/04/19 1125     Glucose 129 mg/dL     POC Glucose Once [[id number]]  (Normal) Collected:  12/04/19 0624    Specimen:  Blood Updated:  12/04/19 0625     Glucose 96 mg/dL     Digoxin Level [[id number]]  (Abnormal) Collected:  12/04/19 0428    Specimen:  Blood from Arm, Left Updated:  12/04/19 0550     Digoxin 1.50 ng/mL     Basic Metabolic Panel [[id number]]  (Normal) Collected:  12/04/19 0428    Specimen:  Blood from Arm, Left Updated:  12/04/19 0544     Glucose 94 mg/dL      BUN 16 mg/dL      Creatinine 0.65 mg/dL      Sodium 138 mmol/L      Potassium 3.5 mmol/L      Chloride 101 mmol/L      CO2 24.3 mmol/L      Calcium 8.5 mg/dL      eGFR Non African Amer 86 mL/min/1.73      BUN/Creatinine Ratio 24.6     Anion Gap 12.7 mmol/L     Narrative:       GFR Normal >60  Chronic Kidney Disease <60  Kidney Failure <15    CBC Auto Differential [[id number]]  (Abnormal) Collected:  12/04/19 0428    Specimen:  Blood from Arm, Left Updated:  12/04/19 0520     WBC 5.62 10*3/mm3      RBC 2.70 10*6/mm3      Hemoglobin 8.8 g/dL      Hematocrit 26.2 %      MCV 97.0 fL      MCH 32.6 pg      MCHC 33.6 g/dL      RDW 14.6 %      RDW-SD 52.0 fl      MPV 9.8 fL      Platelets 190 10*3/mm3      Neutrophil % 73.4 %      Lymphocyte % 14.9 %      Monocyte % 8.4 %      Eosinophil % 2.5 %      Basophil % 0.4 %      Immature Grans % 0.4 %      Neutrophils, Absolute 4.13 10*3/mm3      Lymphocytes, Absolute 0.84 10*3/mm3      Monocytes, Absolute 0.47 10*3/mm3      Eosinophils, Absolute 0.14 10*3/mm3      Basophils, Absolute 0.02 10*3/mm3      Immature Grans, Absolute 0.02 10*3/mm3      nRBC 0.0 /100 WBC     POC Glucose Once [[id number]]  (Abnormal) Collected:  12/03/19 2043    Specimen:  Blood  Updated:  12/03/19 2045     Glucose 246 mg/dL     POC Glucose Once [[id number]]  (Abnormal) Collected:  12/03/19 1616    Specimen:  Blood Updated:  12/03/19 1618     Glucose 134 mg/dL     POC Glucose Once [[id number]]  (Abnormal) Collected:  12/03/19 1453    Specimen:  Blood Updated:  12/03/19 1453     Glucose 135 mg/dL     Digoxin Level [[id number]]  (Abnormal) Collected:  12/03/19 1038    Specimen:  Blood Updated:  12/03/19 1137     Digoxin 1.50 ng/mL     CBC & Differential [[id number]] Collected:  12/03/19 1038    Specimen:  Blood Updated:  12/03/19 1124    Narrative:       The following orders were created for panel order CBC & Differential.  Procedure                               Abnormality         Status                     ---------                               -----------         ------                     CBC Auto Differential[[id number]]        Abnormal            Final result                 Please view results for these tests on the individual orders.    CBC Auto Differential [[id number]]  (Abnormal) Collected:  12/03/19 1038    Specimen:  Blood Updated:  12/03/19 1124     WBC 7.22 10*3/mm3      RBC 2.99 10*6/mm3      Hemoglobin 10.2 g/dL      Hematocrit 29.8 %      MCV 99.7 fL      MCH 34.1 pg      MCHC 34.2 g/dL      RDW 14.4 %      RDW-SD 52.1 fl      MPV 9.6 fL      Platelets 196 10*3/mm3      Neutrophil % 79.8 %      Lymphocyte % 10.9 %      Monocyte % 6.8 %      Eosinophil % 1.9 %      Basophil % 0.3 %      Immature Grans % 0.3 %      Neutrophils, Absolute 5.76 10*3/mm3      Lymphocytes, Absolute 0.79 10*3/mm3      Monocytes, Absolute 0.49 10*3/mm3      Eosinophils, Absolute 0.14 10*3/mm3      Basophils, Absolute 0.02 10*3/mm3      Immature Grans, Absolute 0.02 10*3/mm3      nRBC 0.0 /100 WBC     Basic Metabolic Panel [[id number]]  (Abnormal) Collected:  12/03/19 1038    Specimen:  Blood Updated:  12/03/19 1108     Glucose 131 mg/dL      BUN 20 mg/dL      Creatinine 0.88 mg/dL      Sodium 137 mmol/L       Potassium 3.8 mmol/L      Chloride 101 mmol/L      CO2 22.5 mmol/L      Calcium 8.9 mg/dL      eGFR Non African Amer 60 mL/min/1.73      BUN/Creatinine Ratio 22.7     Anion Gap 13.5 mmol/L     Narrative:       GFR Normal >60  Chronic Kidney Disease <60  Kidney Failure <15        Data Review:  Results from last 7 days   Lab Units 12/05/19  0504 12/04/19  0428 12/03/19  1038   SODIUM mmol/L 137 138 137   POTASSIUM mmol/L 3.9 3.5 3.8   CHLORIDE mmol/L 101 101 101   CO2 mmol/L 23.4 24.3 22.5   BUN mg/dL 14 16 20   CREATININE mg/dL 0.73 0.65 0.88   GLUCOSE mg/dL 104* 94 131*   CALCIUM mg/dL 8.5 8.5 8.9     Results from last 7 days   Lab Units 12/05/19  0504 12/04/19  0428 12/03/19  1038   WBC 10*3/mm3 5.99 5.62 7.22   HEMOGLOBIN g/dL 9.1* 8.8* 10.2*   HEMATOCRIT % 27.5* 26.2* 29.8*   PLATELETS 10*3/mm3 203 190 196             Lab Results   Lab Value Date/Time    TROPONINT <0.01 01/27/2014 0350    TROPONINT <0.01 01/26/2014 1948               Invalid input(s): PROT, LABALBU          Glucose   Date/Time Value Ref Range Status   12/05/2019 1108 192 (H) 70 - 130 mg/dL Final   12/05/2019 0604 113 70 - 130 mg/dL Final   12/04/2019 2112 138 (H) 70 - 130 mg/dL Final   12/04/2019 1630 163 (H) 70 - 130 mg/dL Final   12/04/2019 1120 129 70 - 130 mg/dL Final   12/04/2019 0624 96 70 - 130 mg/dL Final   12/03/2019 2043 246 (H) 70 - 130 mg/dL Final   12/03/2019 1616 134 (H) 70 - 130 mg/dL Final           Past Medical History:   Diagnosis Date   • Arthritis    • Atrial fibrillation (CMS/HCC)    • CHF (congestive heart failure) (CMS/HCC)    • Degeneration of thoracic intervertebral disc    • Diabetes mellitus (CMS/HCC)    • Gout    • H/O Pulmonary nodule 10/2013   • History of anemia 10/2013   • History of cellulitis 2016    Finger of left hand   • Hyperlipidemia    • Hypertension    • Odontoid fracture (CMS/HCC)    • Osteoporosis    • Pacemaker        Assessment:  Active Hospital Problems    Diagnosis  POA   • **Closed nondisplaced  fracture of sixth cervical vertebra (CMS/[facility]) [S12.501A]  Yes   • Stage 3 chronic kidney disease (CMS/[facility]) [N18.3]  Yes   • Anemia [D64.9]  Yes   • History of pacemaker [Z95.0]  Not Applicable   • Persistent atrial fibrillation [I48.19]  Yes   • Type 2 diabetes mellitus with both eyes affected by moderate nonproliferative retinopathy without macular edema, without long-term current use of insulin (CMS/[facility]) [E11.3393]  Yes   • Essential hypertension [I10]  Yes      Resolved Hospital Problems   No resolved problems to display.       Plan:  Continue current RX. Will need SNU for rehab.  ENT consult noted. DC planning.    Donny Brooke MD  12/5/2019  11:44 AM

## 2019-12-05 NOTE — PLAN OF CARE
Problem: Nutrition, Imbalanced: Inadequate Oral Intake (Adult)  Intervention: Promote/Optimize Nutrition   12/05/19 9121   Nutrition Interventions   Oral Nutrition Promotion nutritional therapy counseling provided;calorie dense liquids provided

## 2019-12-06 VITALS
TEMPERATURE: 97.5 F | DIASTOLIC BLOOD PRESSURE: 64 MMHG | WEIGHT: 157.63 LBS | HEART RATE: 69 BPM | OXYGEN SATURATION: 94 % | BODY MASS INDEX: 27.93 KG/M2 | SYSTOLIC BLOOD PRESSURE: 113 MMHG | HEIGHT: 63 IN | RESPIRATION RATE: 16 BRPM

## 2019-12-06 LAB
ANION GAP SERPL CALCULATED.3IONS-SCNC: 10.3 MMOL/L (ref 5–15)
BASOPHILS # BLD AUTO: 0.01 10*3/MM3 (ref 0–0.2)
BASOPHILS NFR BLD AUTO: 0.1 % (ref 0–1.5)
BUN BLD-MCNC: 15 MG/DL (ref 8–23)
BUN/CREAT SERPL: 20.5 (ref 7–25)
CALCIUM SPEC-SCNC: 8.7 MG/DL (ref 8.2–9.6)
CHLORIDE SERPL-SCNC: 97 MMOL/L (ref 98–107)
CO2 SERPL-SCNC: 23.7 MMOL/L (ref 22–29)
CREAT BLD-MCNC: 0.73 MG/DL (ref 0.57–1)
DEPRECATED RDW RBC AUTO: 50.3 FL (ref 37–54)
EOSINOPHIL # BLD AUTO: 0.03 10*3/MM3 (ref 0–0.4)
EOSINOPHIL NFR BLD AUTO: 0.4 % (ref 0.3–6.2)
ERYTHROCYTE [DISTWIDTH] IN BLOOD BY AUTOMATED COUNT: 14.3 % (ref 12.3–15.4)
GFR SERPL CREATININE-BSD FRML MDRD: 75 ML/MIN/1.73
GLUCOSE BLD-MCNC: 120 MG/DL (ref 65–99)
GLUCOSE BLDC GLUCOMTR-MCNC: 122 MG/DL (ref 70–130)
GLUCOSE BLDC GLUCOMTR-MCNC: 134 MG/DL (ref 70–130)
HCT VFR BLD AUTO: 25.3 % (ref 34–46.6)
HGB BLD-MCNC: 8.8 G/DL (ref 12–15.9)
IMM GRANULOCYTES # BLD AUTO: 0.05 10*3/MM3 (ref 0–0.05)
IMM GRANULOCYTES NFR BLD AUTO: 0.7 % (ref 0–0.5)
LYMPHOCYTES # BLD AUTO: 0.8 10*3/MM3 (ref 0.7–3.1)
LYMPHOCYTES NFR BLD AUTO: 10.7 % (ref 19.6–45.3)
MCH RBC QN AUTO: 33.6 PG (ref 26.6–33)
MCHC RBC AUTO-ENTMCNC: 34.8 G/DL (ref 31.5–35.7)
MCV RBC AUTO: 96.6 FL (ref 79–97)
MONOCYTES # BLD AUTO: 0.7 10*3/MM3 (ref 0.1–0.9)
MONOCYTES NFR BLD AUTO: 9.3 % (ref 5–12)
NEUTROPHILS # BLD AUTO: 5.91 10*3/MM3 (ref 1.7–7)
NEUTROPHILS NFR BLD AUTO: 78.8 % (ref 42.7–76)
NRBC BLD AUTO-RTO: 0 /100 WBC (ref 0–0.2)
PLATELET # BLD AUTO: 173 10*3/MM3 (ref 140–450)
PMV BLD AUTO: 10.1 FL (ref 6–12)
POTASSIUM BLD-SCNC: 3.6 MMOL/L (ref 3.5–5.2)
RBC # BLD AUTO: 2.62 10*6/MM3 (ref 3.77–5.28)
SODIUM BLD-SCNC: 131 MMOL/L (ref 136–145)
WBC NRBC COR # BLD: 7.5 10*3/MM3 (ref 3.4–10.8)

## 2019-12-06 PROCEDURE — 85025 COMPLETE CBC W/AUTO DIFF WBC: CPT | Performed by: HOSPITALIST

## 2019-12-06 PROCEDURE — 82962 GLUCOSE BLOOD TEST: CPT

## 2019-12-06 PROCEDURE — 97110 THERAPEUTIC EXERCISES: CPT

## 2019-12-06 PROCEDURE — 80048 BASIC METABOLIC PNL TOTAL CA: CPT | Performed by: HOSPITALIST

## 2019-12-06 RX ORDER — ACETAMINOPHEN 325 MG/1
650 TABLET ORAL EVERY 4 HOURS PRN
Start: 2019-12-06

## 2019-12-06 RX ORDER — INSULIN GLARGINE 100 [IU]/ML
8 INJECTION, SOLUTION SUBCUTANEOUS NIGHTLY
Refills: 12
Start: 2019-12-06 | End: 2020-09-23

## 2019-12-06 RX ORDER — TRAMADOL HYDROCHLORIDE 50 MG/1
25 TABLET ORAL EVERY 6 HOURS PRN
Qty: 20 TABLET | Refills: 0 | Status: SHIPPED | OUTPATIENT
Start: 2019-12-06 | End: 2019-12-13

## 2019-12-06 RX ADMIN — ALLOPURINOL 100 MG: 100 TABLET ORAL at 08:21

## 2019-12-06 RX ADMIN — ACETAMINOPHEN 650 MG: 325 TABLET, FILM COATED ORAL at 08:20

## 2019-12-06 RX ADMIN — SODIUM CHLORIDE, PRESERVATIVE FREE 10 ML: 5 INJECTION INTRAVENOUS at 08:21

## 2019-12-06 RX ADMIN — SALINE NASAL SPRAY 2 SPRAY: 1.5 SOLUTION NASAL at 11:38

## 2019-12-06 RX ADMIN — ATORVASTATIN CALCIUM 20 MG: 20 TABLET, FILM COATED ORAL at 08:20

## 2019-12-06 RX ADMIN — TRAMADOL HYDROCHLORIDE 25 MG: 50 TABLET, FILM COATED ORAL at 03:45

## 2019-12-06 RX ADMIN — SALINE NASAL SPRAY 2 SPRAY: 1.5 SOLUTION NASAL at 08:21

## 2019-12-06 RX ADMIN — METOPROLOL TARTRATE 50 MG: 50 TABLET, FILM COATED ORAL at 08:20

## 2019-12-06 RX ADMIN — TRAMADOL HYDROCHLORIDE 25 MG: 50 TABLET, FILM COATED ORAL at 11:38

## 2019-12-06 NOTE — PROGRESS NOTES
Continued Stay Note  [facility]     Patient Name: Val Jeronimo  MRN: [id number]  Today's Date: 12/6/2019    Admit Date: 12/3/2019    Discharge Plan     Row Name 12/06/19 1034       Plan    Plan  Referral to [facility]    Patient/Family in Agreement with Plan  yes    Plan Comments  Reviewed PT notes and spoke with PT today. SPoke with pt, need for SNF. Pt has been to [facility] in the past. Pt stated she would like CCP to make referral but also check with her daughter Haritha. CCP called Haritha, introduced self and explained CCP role. Discussed PT recommedations and she confirmed referral to [facility]. CCP spoke with N[facility] for referral and she will evaluate. Packet started in CCP office. Nahid barraza/ccp        Discharge Codes    No documentation.             Maria Teresa Forde, RN

## 2019-12-06 NOTE — DISCHARGE PLACEMENT REQUEST
"Val Jeronimo (89 y.o. Female)     Date of Birth Social Security Number Address Home Phone MRN    08/18/1929  [address]    [place] [address] [phone] [id number]    Roman Catholic Marital Status          Rastafarian        Admission Date Admission Type Admitting Provider Attending Provider Department, Room/Bed    12/3/19 Emergency Adolfo Grant MD Beard, Lyle E, MD [address], [address]    Discharge Date Discharge Disposition Discharge Destination                       Attending Provider:  Donny Brooke MD    Allergies:  Advil [Ibuprofen]    Isolation:  None   Infection:  None   Code Status:  CPR    Ht:  160 cm (63\")   Wt:  71.5 kg (157 lb 10.1 oz)    Admission Cmt:  None   Principal Problem:  Closed nondisplaced fracture of sixth cervical vertebra (CMS/[facility]) [S12.501A]                 Active Insurance as of 12/3/2019     Primary Coverage     Payor Plan Insurance Group Employer/Plan Group    MEDICARE MEDICARE A & B      Payor Plan Address Payor Plan Phone Number Payor Plan Fax Number Effective Dates    [address] [phone]  8/1/1994 - None Entered    [facility] [id number]       Subscriber Name Subscriber Birth Date Member ID       VAL JERONIMO 8/18/1929 [id number]           Secondary Coverage     Payor Plan Insurance Group Employer/Plan Group    [facility] SUPP [id number]     Payor Plan Address Payor Plan Phone Number Payor Plan Fax Number Effective Dates    [address]   12/1/2016 - None Entered    [facility] [id number]       Subscriber Name Subscriber Birth Date Member ID       VAL JERONIMO 8/18/1929 [id number]                 Emergency Contacts      (Rel.) Home Phone Work Phone Mobile Phone    AricHaritha (Daughter) [phone] -- [phone]    ORIN CHAUDHARY (Daughter) -- -- [phone]              "

## 2019-12-06 NOTE — THERAPY TREATMENT NOTE
Patient Name: Val Jeronimo  : 1929    MRN: [id number]                              Today's Date: 2019       Admit Date: 12/3/2019    Visit Dx:     ICD-10-CM ICD-9-CM   1. Closed nondisplaced fracture of sixth cervical vertebra, unspecified fracture morphology, initial encounter (CMS/[facility]) S12.501A 805.06   2. Closed fracture of nasal bone, initial encounter S02.2XXA 802.0   3. Closed nondisplaced fracture of proximal phalanx of right ring finger, initial encounter S62.644A 816.01   4. Closed nondisplaced fracture of proximal phalanx of right little finger, initial encounter S62.646A 816.01     Patient Active Problem List   Diagnosis   • Type 2 diabetes mellitus with both eyes affected by moderate nonproliferative retinopathy without macular edema, without long-term current use of insulin (CMS/[facility])   • Essential hypertension   • Persistent atrial fibrillation   • History of pacemaker   • Other hyperlipidemia   • Osteoporosis   • Hand arthritis   • Idiopathic chronic gout of multiple sites without tophus   • Anemia   • Abrasion of right lower extremity   • Stage 3 chronic kidney disease (CMS/[facility])   • Osteopoikilosis   • Closed nondisplaced fracture of sixth cervical vertebra (CMS/[facility])     Past Medical History:   Diagnosis Date   • Arthritis    • Atrial fibrillation (CMS/[facility])    • CHF (congestive heart failure) (CMS/[facility])    • Degeneration of thoracic intervertebral disc    • Diabetes mellitus (CMS/[facility])    • Gout    • H/O Pulmonary nodule 10/2013   • History of anemia 10/2013   • History of cellulitis 2016    Finger of left hand   • Hyperlipidemia    • Hypertension    • Odontoid fracture (CMS/[facility])    • Osteoporosis    • Pacemaker      Past Surgical History:   Procedure Laterality Date   • NECK SURGERY  10/2013    Broken neck repair   • PACEMAKER IMPLANTATION       General Information     Row Name 19 1012          PT Evaluation Time/Intention    Document Type  therapy note (daily note)  -MD (r) SP  (codi) MD (manjit)     Mode of Treatment  physical therapy  -MD norwood) ADAMS (codi) MD (c)     Row Name 12/06/19 1012          General Information    Patient Profile Reviewed?  yes  -MD ADAMS evans (r)) MD chin)     Existing Precautions/Restrictions  brace worn when out of bed;fall  -MD ADAMS evans (r)) MD chin)     Row Name 12/06/19 1012          Cognitive Assessment/Intervention- PT/OT    Orientation Status (Cognition)  oriented x 3  -MD ADAMS (r) (codi) MD (manjit)     Row Name 12/06/19 1012          Safety Issues, Functional Mobility    Impairments Affecting Function (Mobility)  pain;endurance/activity tolerance;balance;strength  -MD (efrain) ADAMS (codi) MD (manjit)       User Key  (r) = Recorded By, (t) = Taken By, (c) = Cosigned By    Initials Name Provider Type    Rebecca Christianson, PT Physical Therapist    Snow Rebolledo, PT Student PT Student        Mobility     Row Name 12/06/19 1013          Bed Mobility Assessment/Treatment    Bed Mobility Assessment/Treatment  supine-sit;sit-supine  -MD norwood) ADAMS (codi) MD (manjit)     Supine-Sit Felton (Bed Mobility)  minimum assist (75% patient effort);moderate assist (50% patient effort);verbal cues;nonverbal cues (demo/gesture)  -MD ADAMS evans (r)) MD (manjit)     Sit-Supine Felton (Bed Mobility)  moderate assist (50% patient effort)  -MD ADAMS evans (r)) MD chin)     Assistive Device (Bed Mobility)  bed rails;head of bed elevated  -MD ADAMS evans (r)) MD chin)     Row Name 12/06/19 1013          Sit-Stand Transfer    Sit-Stand Felton (Transfers)  minimum assist (75% patient effort)  -MD ADAMS evans (r)) MD (manjit)     Assistive Device (Sit-Stand Transfers)  walker, front-wheeled  -MD ADAMS evans (r)) MD chin)     Row Name 12/06/19 1013          Gait/Stairs Assessment/Training    Gait/Stairs Assessment/Training  gait/ambulation assistive device  -MD ADAMS evans (r)) MD chin)     Felton Level (Gait)  contact guard;verbal cues;nonverbal cues (demo/gesture)  -MD ADAMS evans (r)) MD (manjit)     Assistive Device (Gait)  walker, front-wheeled  -MD (r) SP (t) MD (c)     Distance  "in Feet (Gait)  40 feet  -MD (r) SP (t) MD (c)     Pattern (Gait)  step-to  -MD (r) SP (t) MD (c)     Deviations/Abnormal Patterns (Gait)  antalgic;left sided deviations;stride length decreased;bilateral deviations;kaylen decreased;gait speed decreased  -MD (r) SP (t) MD (c)     Bilateral Gait Deviations  forward flexed posture  -MD (r) SP (t) MD (c)     Right Sided Gait Deviations  weight shift ability decreased  -MD (r) SP (t) MD (c)     Webb Level (Stairs)  not tested  -MD (r) SP (t) MD (c)       User Key  (r) = Recorded By, (t) = Taken By, (c) = Cosigned By    Initials Name Provider Type    Rebecca Christianson, PT Physical Therapist    Snow Rebolledo, PT Student PT Student        Obj/Interventions     Row Name 12/06/19 1016          Therapeutic Exercise    Lower Extremity (Therapeutic Exercise)  LAQ (long arc quad), bilateral;marching while seated  -MD (r) SP (t) MD (c)     Lower Extremity Range of Motion (Therapeutic Exercise)  ankle dorsiflexion/plantar flexion, bilateral  -MD (r) SP (t) MD (c)     Position (Therapeutic Exercise)  seated  -MD (r) SP (t) MD (c)     Row Name 12/06/19 1016          Static Sitting Balance    Level of Webb (Unsupported Sitting, Static Balance)  supervision  -MD (r) SP (t) MD (c)     Sitting Position (Unsupported Sitting, Static Balance)  sitting on edge of bed  -MD (r) SP (t) MD (c)     Time Able to Maintain Position (Unsupported Sitting, Static Balance)  1 to 2 minutes  -MD (r) SP (t) MD (c)       User Key  (r) = Recorded By, (t) = Taken By, (c) = Cosigned By    Initials Name Provider Type    Rebecca Christianson, PT Physical Therapist    Snow Rebolledo, PT Student PT Student        Goals/Plan    No documentation.       Clinical Impression     Row Name 12/06/19 1017          Pain Scale: Numbers Pre/Post-Treatment    Pre/Post Treatment Pain Comment  Pt did not give number, reported she was \"just sore\" everywhere  -MD (r) SP (t) MD (c)     Pain Intervention(s)  " Repositioned;Ambulation/increased activity  -MD ADAMS evans (r)) MD (c)     Row Name 12/06/19 1017          Plan of Care Review    Plan of Care Reviewed With  patient  -MD ADAMS evans (r)) MD chin)     Row Name 12/06/19 1017          Vital Signs    Pre SpO2 (%)  97  -MD ADAMS evans (r)) MD (c)     O2 Delivery Pre Treatment  room air  -MD ADAMS evans (r)) MD (c)     O2 Delivery Intra Treatment  room air  -MD ADAMS JUNIOR (r t) (c)     Post SpO2 (%)  97  -MD ADAMS evans (r)) MD (c)     O2 Delivery Post Treatment  room air  -MD ADAMS evans (r)) MD (manjit)     Row Name 12/06/19 1017          Positioning and Restraints    Pre-Treatment Position  in bed  -MD ADAMS evans (r)) MD (manjit)     Post Treatment Position  bed  -MD ADAMS evans (r)) MD (manjit)     In Bed  fowlers;call light within reach;encouraged to call for assist;exit alarm on;side rails up x2;SCD pump applied  -MD ADAMS evans (r)) MD (manjit)       User Key  (r) = Recorded By, (t) = Taken By, (c) = Cosigned By    Initials Name Provider Type    Rebecca Christianson, PT Physical Therapist    Snow Rebolledo, PT Student PT Student        Outcome Measures     Row Name 12/06/19 1020          How much help from another person do you currently need...    Turning from your back to your side while in flat bed without using bedrails?  3  -MD ADAMS chin (r t))     Moving from lying on back to sitting on the side of a flat bed without bedrails?  2  -MD ADAMS chin (r t))     Moving to and from a bed to a chair (including a wheelchair)?  3  -MD ADAMS chin (r t))     Standing up from a chair using your arms (e.g., wheelchair, bedside chair)?  3  -MD ADAMS chin (r t))     Climbing 3-5 steps with a railing?  1  -MD ADAMS chin (r t))     To walk in hospital room?  3  -MD ADAMS chin (r t))     AM-PAC 6 Clicks Score (PT)  15  -MD (r) SP (t)     Row Name 12/06/19 1020          Functional Assessment    Outcome Measure Options  AM-PAC 6 Clicks Basic Mobility (PT)  -MD (r) SP (t) MD (c)       User Key  (r) = Recorded By, (t) = Taken By, (c) = Cosigned By    Initials  Name Provider Type    Rebecca Christianson, PT Physical Therapist    Snow Rebolledo, PT Student PT Student        Physical Therapy Education     Title: PT OT SLP Therapies (In Progress)     Topic: Physical Therapy (In Progress)     Point: Mobility training (Done)     Learning Progress Summary           Patient Acceptance, E, VU,NR by SP at 12/6/2019 10:21 AM    Acceptance, E, VU by SP at 12/5/2019  1:04 PM    Acceptance, E, VU by SP at 12/4/2019  9:11 AM   Family Acceptance, E, VU by SP at 12/5/2019  1:04 PM    Acceptance, E, VU by SP at 12/4/2019  9:11 AM                   Point: Precautions (Done)     Learning Progress Summary           Patient Acceptance, E, VU,NR by SP at 12/6/2019 10:21 AM    Acceptance, E, VU by SP at 12/5/2019  1:04 PM    Acceptance, E, VU by SP at 12/4/2019  9:11 AM   Family Acceptance, E, VU by SP at 12/5/2019  1:04 PM    Acceptance, E, VU by SP at 12/4/2019  9:11 AM                               User Key     Initials Effective Dates Name Provider Type Discipline    ADAMS 10/11/19 -  Snow Tavarez, PT Student PT Student PT              PT Recommendation and Plan  Planned Therapy Interventions (PT Eval): balance training, bed mobility training, gait training, patient/family education, strengthening, transfer training  Outcome Summary/Treatment Plan (PT)  Anticipated Discharge Disposition (PT): skilled nursing facility  Plan of Care Reviewed With: patient  Progress: declining  Outcome Summary: Pt had increased complaints of pain today, but was agreeable to work with PT. She demonstrated decreased gait speed, very antalgic, with decreased foot clearance as well compared to past few days. After treatment today, PT believes that it would no longer be safe for pt to return home due to assistance that would be needed for mobility and ADLs. Pt would also be at an increased risk for falls if she returned home. PT is recommending pt go to SNF upon D/C from hospital for continued skilled PT services to  address deficits and decrease falls risk before returning home.     Time Calculation:   PT Charges     Row Name 12/06/19 1021             Time Calculation    Start Time  0945  -MD (r) ADAMS (t) MD (c)      Stop Time  1008  -MD norwood) ADAMS (t) MD (c)      Time Calculation (min)  23 min  -MD norwood) ADAMS (t)      PT Received On  12/06/19  -MD ADAMS (r) (t) MD (c)      PT - Next Appointment  12/07/19  -MD ADAMS (r) (codi) MD (manjit)        User Key  (r) = Recorded By, (t) = Taken By, (c) = Cosigned By    Initials Name Provider Type    Rebecca Christianson, PT Physical Therapist    Snow Rebolledo, PT Student PT Student        Therapy Charges for Today     Code Description Service Date Service Provider Modifiers Qty    87375711399 HC PT THER PROC EA 15 MIN 12/5/2019 Snow Tavarez, PT Student GP 1    23104543630 HC PT THER PROC EA 15 MIN 12/6/2019 Snow Tavarez, PT Student GP 2          PT G-Codes  Outcome Measure Options: AM-PAC 6 Clicks Basic Mobility (PT)  AM-PAC 6 Clicks Score (PT): 15    Snow Tavarez PT Student  12/6/2019

## 2019-12-06 NOTE — PLAN OF CARE
Problem: Patient Care Overview  Goal: Plan of Care Review   12/06/19 1023   OTHER   Outcome Summary Pt had increased complaints of pain today, but was agreeable to work with PT. She demonstrated decreased gait speed, very antalgic, and decreased foot clearance compared to past few days. She complained of pain in her right foot and demonstrated decreased stance time on that leg. Her nurse was notified about this. After treatment today, PT believes that it would no longer be safe for pt to return home due to assistance that would be needed for mobility and ADLs. Pt would also be at an increased risk for falls if she returned home. PT is recommending pt go to SNF upon D/C from hospital for continued skilled PT services to address deficits and decrease falls risk before returning home.   Plan of Care Review   Progress declining   Coping/Psychosocial   Plan of Care Reviewed With patient

## 2019-12-06 NOTE — PLAN OF CARE
Problem: Patient Care Overview  Goal: Plan of Care Review  Outcome: Ongoing (interventions implemented as appropriate)   12/06/19 6166   OTHER   Outcome Summary VSS. Tramadol given for pain control. Ambulating with assistance. Awaiting rehab approval for discharge. Will continue to monitor.    Plan of Care Review   Progress improving   Coping/Psychosocial   Plan of Care Reviewed With patient

## 2019-12-06 NOTE — DISCHARGE SUMMARY
PHYSICIAN DISCHARGE SUMMARY                                                                        [facility]    Patient Identification:  Name: Val Jeronimo  Age: 89 y.o.  Sex: female  :  1929  MRN: [id number]  Primary Care Physician: Harlan Arthur MD    Admit date: 12/3/2019  Discharge date and time:2019  Discharged Condition: good    Discharge Diagnoses:  Active Hospital Problems    Diagnosis  POA   • **Closed nondisplaced fracture of sixth cervical vertebra (CMS/HCC) [S12.501A]  Yes   • Stage 3 chronic kidney disease (CMS/HCC) [N18.3]  Yes   • Anemia [D64.9]  Yes   • History of pacemaker [Z95.0]  Not Applicable   • Persistent atrial fibrillation [I48.19]  Yes   • Type 2 diabetes mellitus with both eyes affected by moderate nonproliferative retinopathy without macular edema, without long-term current use of insulin (CMS/HCC) [E11.3393]  Yes   • Essential hypertension [I10]  Yes      Resolved Hospital Problems   No resolved problems to display.          PMHX:   Past Medical History:   Diagnosis Date   • Arthritis    • Atrial fibrillation (CMS/HCC)    • CHF (congestive heart failure) (CMS/HCC)    • Degeneration of thoracic intervertebral disc    • Diabetes mellitus (CMS/HCC)    • Gout    • H/O Pulmonary nodule 10/2013   • History of anemia 10/2013   • History of cellulitis 2016    Finger of left hand   • Hyperlipidemia    • Hypertension    • Odontoid fracture (CMS/HCC)    • Osteoporosis    • Pacemaker      PSHX:   Past Surgical History:   Procedure Laterality Date   • NECK SURGERY  10/2013    Broken neck repair   • PACEMAKER IMPLANTATION         Hospital Course: Val Jeronimo  is a 89 y.o. with a history of DM2, persistant atrial fibrilation, pacemaker that presents to [facility] after having a fall this morning at her assisted living facility.  She states she got up to go to the bathroom and on the  "way back \"lost her balance\" and fell face first on the carpet next to the bed.  She denies any loss of consciousness, denies any dizzness, chest pain or palpitations.  She was able to push her medical alert bracelet for help as soon as she fell.  She denies any pain at this time, no focal neuro deficits.  She does walk with a walker at home but was not using it to go to the bathroom.          The patient was admitted to the hospital and found to have cervical spine fracture.  Neurosurgery recommended she wear hard cervical collar for a few weeks and will follow-up in the office in 3 weeks.  She also had fracture of finger and was seen by hand surgery and they want to follow-up in the office as well.  She was pretty weak and the plan is to go to skilled nursing facility for rehab until she is strong enough to get back home.  She will follow-up with her primary care physician after released from rehab.    Consults:     Consults     Date and Time Order Name Status Description    12/3/2019 1359 Inpatient ENT Consult Completed     12/3/2019 1359 Inpatient Neurosurgery Consult      12/3/2019 1057 [facility] (on-call MD unless specified) Details Completed     12/3/2019 1028 Neurosurgery (on-call MD unless specified) Completed         Results from last 7 days   Lab Units 12/06/19  0454   WBC 10*3/mm3 7.50   HEMOGLOBIN g/dL 8.8*   HEMATOCRIT % 25.3*   PLATELETS 10*3/mm3 173     Results from last 7 days   Lab Units 12/06/19  0454   SODIUM mmol/L 131*   POTASSIUM mmol/L 3.6   CHLORIDE mmol/L 97*   CO2 mmol/L 23.7   BUN mg/dL 15   CREATININE mg/dL 0.73   GLUCOSE mg/dL 120*   CALCIUM mg/dL 8.7     Significant Diagnostic Studies:   Lab Results   Component Value Date    WBC 7.50 12/06/2019    HGB 8.8 (L) 12/06/2019    HCT 25.3 (L) 12/06/2019     12/06/2019     Lab Results   Component Value Date     (L) 12/06/2019    K 3.6 12/06/2019    CL 97 (L) 12/06/2019    CO2 23.7 12/06/2019    BUN 15 12/06/2019    CREATININE 0.73 " 12/06/2019    GLUCOSE 120 (H) 12/06/2019     Lab Results   Component Value Date    CALCIUM 8.7 12/06/2019     No results found for: AST, ALT, ALKPHOS  No results found for: APTT, INR  No results found for: COLORU, CLARITYU, SPECGRAV, PHUR, PROTEINUR, GLUCOSEU, KETONESU, BLOODU, NITRITE, LEUKOCYTESUR, BILIRUBINUR, UROBILINOGEN, RBCUA, WBCUA, BACTERIA, UACOMMENT  No results found for: TROPONINT, TROPONINI, BNP  No components found for: HGBA1C;2  No components found for: TSH;2  Imaging Results (All)     Procedure Component Value Units Date/Time    CT Head Without Contrast [[id number]] Collected:  12/03/19 1011     Updated:  12/03/19 1133    Narrative:       CLINICAL HISTORY: Patient fell face first, hit her nose, has nasal  swelling, epistaxis and headache.     HEAD CT TECHNIQUE: Spiral CT images were obtained from the base of the  skull to the vertex without intravenous contrast. Images were  reformatted and submitted in 3 mm thick axial CT sections with brain  algorithm, 2 mm thick axial CT sections with high-resolution bone  algorithm, 2 mm thick sagittal and coronal reconstructions were  performed and submitted in brain algorithm.     COMPARISON: This is correlated to a prior head CT from [facility] on 10/26/2013.     FINDINGS: There is prominent patchy and confluent low density in the  periventricular and subcortical white matter of the cerebral hemispheres  consistent with moderate to severe small vessel disease. There are 3-4  mm rounded calcified subependymal nodules along the subependymal region  of the posterior lateral bodies of the lateral ventricles bilaterally as  well as in the lateral aspect of the trigone of the right lateral  ventricle measuring 6 x 4 mm and in the temporal horn of the right  lateral ventricle measuring 7 x 6 mm. These are unchanged. Ventricles  are normal in size. There is no mass effect and no midline shift and no  extraaxial fluid collections are identified and there  is no evidence of  acute intracranial hemorrhage. There are multiple small nodular areas of  sclerosis in the superior frontal parietal bones and posterior occipital  bones. 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 No acute skull fracture is  seen. There is extensive soft tissue swelling in the paranasal soft  tissues. No acute skull fracture is identified. Hairline lucencies in  the right and left nasal bones are suspicious for hairline nondisplaced  nasal bone fractures. There is mild mucosal thickening in the frontal  recesses, the anterior ethmoid sinuses, inferior maxillary sinus. There  is a small amount of fluid in the posterior left maxillary and sphenoid  sinus. The mastoid and middle ear cavities are clear.       Impression:       1. Since prior head CT 10/26/2013 there has been facial trauma with a  paranasal hematoma and hairline lucencies in the nasal bone suspicious  for hairline nondisplaced nasal bone fractures. There is some blood in  the anterior nasal cavity. There is also mild bilateral frontal recess  anterior ethmoid and inferior maxillary sinus mucosal thickening with a  tiny amount of fluid in the posterior left maxillary and sphenoid  sinuses that is new. The remainder of the head CT is unchanged.  2. There is moderate to severe small vessel disease in the cerebral  white matter. There are at least 4 separate calcified subependymal  nodules including rounded 3-4 mm calcified nodules in the lateral  subependymal region and the posterior bodies of the lateral ventricles  bilaterally and a 6 x 4 mm focus in the lateral trigone of the right  lateral ventricle and a 7 x 6 mm calcified subependymal nodule in the  temporal horn of the right lateral ventricle.  3. 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 The remainder  of the head CT is normal with no acute skull fracture or intracranial  hemorrhage identified     FACIAL CT TECHNIQUE: Spiral CT images were obtained through the facial  bones without intravenous contrast in axial imaging plane. Images were  reformatted and are submitted in 2 mm thick axial CT sections with soft  tissue algorithm and 1 mm thick axial, sagittal and coronal CT sections  with high-resolution bone algorithm.     COMPARISON: This is correlated to a prior cervical spine CT 10/26/2013  as well as 10/31/2013.     FINDINGS: There is a hematoma in the paranasal region [] trauma.  Minimally comminuted fracture of the anterior left nasal bone on  sagittal reconstructed images 72 through 74. There appears to be some  minimal inferior angulation of a 6 mm distal left nasal bone fracture  fragment. There is a hematoma in the left nostril. There is mild mucosal  thickening of the frontal recesses, anterior ethmoid sinus, a small  amount of fluid in the posterior left maxillary and left sphenoid sinus,  nodular mucosal thickening inferior maxillary sinuses bilaterally, right  greater than left. Patient has a screw extending from the anterior  inferior body of C2 through a chronic nonunited fracture at the base of  the odontoid process. The screw has some lucency around its margins as  it extends into the superior aspect of the odontoid process suggesting  it may be mildly loosened.     IMPRESSION:  1. There is a hematoma in the paranasal region as well as within the  left nostril and there is blood opacifying the anterior and anterior  superior aspect of the nasal cavity along the  right and left sides of  the anterior and anterior superior aspect of the nasal septum. There is  an acute mildly comminuted fracture of the left nasal bone with minimal  inferior angulation and deviation of a 6 mm distal left nasal bone  fracture fragment.  2. Patient has had prior surgical fixation via screw of the base of the  odontoid fracture back in 2013 and there is a screw extending from the  anterior inferior body  of C2 through a nonunited fracture through the  base the odontoid process and there is lucency along the margin of the  screw as it extends through the body of C2 and within the odontoid  process suggesting the screw may be mildly loose. There are prominent  arthritic changes at the atlantodental articulation.      Results communicated to Dr. Matute in the emergency room by telephone  on 12/03/2019 at 9:50 AM.     Radiation dose reduction techniques were utilized, including automated  exposure control and exposure modulation based on body size.     This report was finalized on 12/3/2019 11:30 AM by Dr. Pablo Fortune M.D.       CT Facial Bones Without Contrast [[id number]] Collected:  12/03/19 1011     Updated:  12/03/19 1133    Narrative:       CLINICAL HISTORY: Patient fell face first, hit her nose, has nasal  swelling, epistaxis and headache.     HEAD CT TECHNIQUE: Spiral CT images were obtained from the base of the  skull to the vertex without intravenous contrast. Images were  reformatted and submitted in 3 mm thick axial CT sections with brain  algorithm, 2 mm thick axial CT sections with high-resolution bone  algorithm, 2 mm thick sagittal and coronal reconstructions were  performed and submitted in brain algorithm.     COMPARISON: This is correlated to a prior head CT from [facility] on 10/26/2013.     FINDINGS: There is prominent patchy and confluent low density in the  periventricular and subcortical white matter of the cerebral hemispheres  consistent with moderate to  severe small vessel disease. There are 3-4  mm rounded calcified subependymal nodules along the subependymal region  of the posterior lateral bodies of the lateral ventricles bilaterally as  well as in the lateral aspect of the trigone of the right lateral  ventricle measuring 6 x 4 mm and in the temporal horn of the right  lateral ventricle measuring 7 x 6 mm. These are unchanged. Ventricles  are normal in size. There is no mass effect and no midline shift and no  extraaxial fluid collections are identified and there is no evidence of  acute intracranial hemorrhage. There are multiple small nodular areas of  sclerosis in the superior frontal parietal bones and posterior occipital  bones. 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 No acute skull fracture is  seen. There is extensive soft tissue swelling in the paranasal soft  tissues. No acute skull fracture is identified. Hairline lucencies in  the right and left nasal bones are suspicious for hairline nondisplaced  nasal bone fractures. There is mild mucosal thickening in the frontal  recesses, the anterior ethmoid sinuses, inferior maxillary sinus. There  is a small amount of fluid in the posterior left maxillary and sphenoid  sinus. The mastoid and middle ear cavities are clear.       Impression:       1. Since prior head CT 10/26/2013 there has been facial trauma with a  paranasal hematoma and hairline lucencies in the nasal bone suspicious  for hairline nondisplaced nasal bone fractures. There is some blood in  the anterior nasal cavity. There is also mild bilateral frontal recess  anterior ethmoid and inferior maxillary sinus mucosal thickening with a  tiny amount of fluid in the posterior left maxillary and sphenoid  sinuses that is new. The remainder of the head CT is unchanged.  2. There is moderate to severe small  vessel disease in the cerebral  white matter. There are at least 4 separate calcified subependymal  nodules including rounded 3-4 mm calcified nodules in the lateral  subependymal region and the posterior bodies of the lateral ventricles  bilaterally and a 6 x 4 mm focus in the lateral trigone of the right  lateral ventricle and a 7 x 6 mm calcified subependymal nodule in the  temporal horn of the right lateral ventricle.  3. 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 The remainder  of the head CT is normal with no acute skull fracture or intracranial  hemorrhage identified     FACIAL CT TECHNIQUE: Spiral CT images were obtained through the facial  bones without intravenous contrast in axial imaging plane. Images were  reformatted and are submitted in 2 mm thick axial CT sections with soft  tissue algorithm and 1 mm thick axial, sagittal and coronal CT sections  with high-resolution bone algorithm.     COMPARISON: This is correlated to a prior cervical spine CT 10/26/2013  as well as 10/31/2013.     FINDINGS: There is a hematoma in the paranasal region [] trauma.  Minimally comminuted fracture of the anterior left nasal bone on  sagittal reconstructed images 72 through 74. There appears to be some  minimal inferior angulation of a 6 mm distal left nasal bone fracture  fragment. There is a hematoma in the left nostril. There is mild mucosal  thickening of the frontal recesses, anterior ethmoid sinus, a small  amount of fluid in the posterior left maxillary and left sphenoid sinus,  nodular mucosal thickening inferior maxillary sinuses bilaterally,  right  greater than left. Patient has a screw extending from the anterior  inferior body of C2 through a chronic nonunited fracture at the base of  the odontoid process. The screw has some lucency around its margins as  it extends into the superior aspect of the odontoid process suggesting  it may be mildly loosened.     IMPRESSION:  1. There is a hematoma in the paranasal region as well as within the  left nostril and there is blood opacifying the anterior and anterior  superior aspect of the nasal cavity along the right and left sides of  the anterior and anterior superior aspect of the nasal septum. There is  an acute mildly comminuted fracture of the left nasal bone with minimal  inferior angulation and deviation of a 6 mm distal left nasal bone  fracture fragment.  2. Patient has had prior surgical fixation via screw of the base of the  odontoid fracture back in 2013 and there is a screw extending from the  anterior inferior body  of C2 through a nonunited fracture through the  base the odontoid process and there is lucency along the margin of the  screw as it extends through the body of C2 and within the odontoid  process suggesting the screw may be mildly loose. There are prominent  arthritic changes at the atlantodental articulation.      Results communicated to Dr. Matute in the emergency room by telephone  on 12/03/2019 at 9:50 AM.     Radiation dose reduction techniques were utilized, including automated  exposure control and exposure modulation based on body size.     This report was finalized on 12/3/2019 11:30 AM by Dr. Pablo Fortune M.D.       CT Cervical Spine Without Contrast [[id number]] Collected:  12/03/19 1100     Updated:  12/03/19 1131    Narrative:       EMERGENCY NONCONTRAST CT SCAN OF THE CERVICAL SPINE 12/03/2019     CLINICAL HISTORY: Fell, facial trauma and has neck pain.     TECHNIQUE: Spiral CT images were obtained from the skull base down to  T2-3 thoracic level and images were  reformatted and submitted in 2 mm  thick axial, sagittal, and coronal CT sections with soft tissue  algorithm and 1 mm thick axial, sagittal and coronal CT sections with  high-resolution bone algorithm.     This is correlated to prior cervical spine CTs from [facility] on 10/26/2013 and 10/31/2013.     FINDINGS: Back in 10/2013, the patient had a screw placed from the  anterior inferior cortex of the body of C2 through a  fracture through  the base of the odontoid process with the superior aspect of the screw  in the superior tip of the odontoid. There is a 2 mm wide chronic  nonunited fracture gap at the base of the odontoid process.  There is  some loosening along the margins of the screw within the body of C2 and  the odontoid suggesting that the screw is slightly loosened.  There are  prominent arthritic changes at the atlantodental interval. There is some  new bone formation anterior to the screw and anterior aspect of the  superior body of C2 and the base of the odontoid within the prevertebral  soft tissues. The ring of C1 is intact.  The remainder of C2 vertebra is  intact. At C2-3,  the posterior disc margin and facets are normal with  no canal or foraminal narrowing.     At C3-4, there is mild to moderate bilateral facet overgrowth, a 2 mm  retrolisthesis of C3 on C4, diffuse posterior disc osteophyte complex  abuts and mildly deforms the ventral surface of the cord mildly to  moderately narrowing the canal.  There is bilateral uncovertebral joint  hypertrophy and there is moderate bilateral bony foraminal narrowing.     At C4-5, there is diffuse posterior disc osteophyte complex.  There is  mild canal narrowing, there is mild to moderate bilateral facet  overgrowth, bilateral uncovertebral joint hypertrophy.  There is mild  left and mild to moderate right bony foraminal narrowing.     At C5-6, there is disc space narrowing and degenerative endplate  changes, posterior endplate spurring and  uncovertebral joint hypertrophy  and mild bilateral facet overgrowth. There is mild canal and there is  mild to moderate left and moderate right foraminal narrowing.     At C6-7, there is a cleft through the anterior inferior corner of the C6  vertebra.  There is a  sharp horizontal lucent cleft that is compatible  with an acute fracture through the far anterior-inferior corner of the  C6 vertebra, extends the fracture plane and extends into the anterior  aspect of this C6-7 disc space.  This is new when compared to prior cervical spine CT 10/31/2013. It is  felt to be an acute fracture, may be from an extension injury. There is  mild posterior endplate spurring and uncovertebral joint hypertrophy and  there is mild canal and there is mild to moderate bilateral bony  foraminal narrowing.     At C7-T1, there is mild to moderate right and there is moderate to  severe left facet overgrowth, a 2-3 mm degenerative anterolisthesis of  C7 on T1.  There is no canal narrowing, there is mild foraminal  narrowing.       Impression:       1. There is an acute fracture through the far anterior inferior corner  of the C6 vertebra extending into the anterior aspect of the C6-7 disc  space. No additional acute fractures are seen in the cervical spine and  may consider a cervical spine MRI to further characterize the ligaments  in the cervical spine at this level.  2. Patient back in 10/2013 had a screw placed from the anterior inferior  cortex of the C2 vertebra across the base of odontoid fracture and tip  of the screws in the superior aspect of the odontoid.  There is a  chronic 2 mm wide nonunited fracture cleft through the base of the  odontoid.  There is some lucency along the margin of the screw within  the body of C2 and in the odontoid suggesting the screw may be mildly  lucent.  3. Diffuse cervical spondylosis as described.  4. Multiple small nodular areas of sclerosis in the cervical spine;  there are also sclerotic  lesions in the facial bones and calvarium.   These have been evaluated by bone scan in the past and are not hot on  bone scan.  The patient does have calcified subependymal nodules.  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      The results were communicated to Dr. Matute in our emergency room by  telephone 12/03/2019.      Radiation dose reduction techniques were utilized, including automated  exposure control and exposure modulation based on body size.     This report was finalized on 12/3/2019 11:28 AM by Dr. Pablo Fortune M.D.       XR Hand 3+ View Right [[id number]] Collected:  12/03/19 0859     Updated:  12/03/19 1053    Narrative:       XR HAND 3+ VIEW RIGHT-  12/03/2019     HISTORY: Fell, hand injury.     FINDINGS: There is a mildly displaced fracture of the proximal aspect of  the proximal phalanx of the right 5th finger. The fracture does not  appear to involve the 5th metacarpophalangeal joint.     There also is a nondisplaced or minimally displaced fracture of the  proximal aspect of the proximal phalanx of the right 4th finger.     Bones are demineralized. There are degenerative changes of the  interphalangeal joints.       Impression:       Fractures of the proximal phalanges of the right 4th and 5th fingers as  described.     This report was finalized on 12/3/2019 10:50 AM by Dr. Cruzito Sanchez M.D.           Lab Results (last 7 days)     Procedure Component Value Units Date/Time    POC Glucose Once [[id number]]  (Abnormal) Collected:  12/06/19 1111    Specimen:  Blood Updated:  12/06/19 1112     Glucose 134 mg/dL     POC Glucose Once [[id number]]  (Normal) Collected:  12/06/19 0612    Specimen:  Blood Updated:  12/06/19 0614     Glucose 122 mg/dL     Basic Metabolic Panel [[id number]]  (Abnormal) Collected:  12/06/19 0454    Specimen:  Blood Updated:  12/06/19 0588      Glucose 120 mg/dL      BUN 15 mg/dL      Creatinine 0.73 mg/dL      Sodium 131 mmol/L      Potassium 3.6 mmol/L      Chloride 97 mmol/L      CO2 23.7 mmol/L      Calcium 8.7 mg/dL      eGFR Non African Amer 75 mL/min/1.73      BUN/Creatinine Ratio 20.5     Anion Gap 10.3 mmol/L     Narrative:       GFR Normal >60  Chronic Kidney Disease <60  Kidney Failure <15    CBC & Differential [[id number]] Collected:  12/06/19 0454    Specimen:  Blood Updated:  12/06/19 0540    Narrative:       The following orders were created for panel order CBC & Differential.  Procedure                               Abnormality         Status                     ---------                               -----------         ------                     CBC Auto Differential[[id number]]        Abnormal            Final result                 Please view results for these tests on the individual orders.    CBC Auto Differential [[id number]]  (Abnormal) Collected:  12/06/19 0454    Specimen:  Blood Updated:  12/06/19 0540     WBC 7.50 10*3/mm3      RBC 2.62 10*6/mm3      Hemoglobin 8.8 g/dL      Hematocrit 25.3 %      MCV 96.6 fL      MCH 33.6 pg      MCHC 34.8 g/dL      RDW 14.3 %      RDW-SD 50.3 fl      MPV 10.1 fL      Platelets 173 10*3/mm3      Neutrophil % 78.8 %      Lymphocyte % 10.7 %      Monocyte % 9.3 %      Eosinophil % 0.4 %      Basophil % 0.1 %      Immature Grans % 0.7 %      Neutrophils, Absolute 5.91 10*3/mm3      Lymphocytes, Absolute 0.80 10*3/mm3      Monocytes, Absolute 0.70 10*3/mm3      Eosinophils, Absolute 0.03 10*3/mm3      Basophils, Absolute 0.01 10*3/mm3      Immature Grans, Absolute 0.05 10*3/mm3      nRBC 0.0 /100 WBC     POC Glucose Once [[id number]]  (Abnormal) Collected:  12/05/19 2105    Specimen:  Blood Updated:  12/05/19 2107     Glucose 194 mg/dL     POC Glucose Once [[id number]]  (Normal) Collected:  12/05/19 1605    Specimen:  Blood Updated:  12/05/19 1608     Glucose 117 mg/dL     POC Glucose Once [[id number]]   (Abnormal) Collected:  12/05/19 1108    Specimen:  Blood Updated:  12/05/19 1110     Glucose 192 mg/dL     Basic Metabolic Panel [[id number]]  (Abnormal) Collected:  12/05/19 0504    Specimen:  Blood Updated:  12/05/19 0731     Glucose 104 mg/dL      BUN 14 mg/dL      Creatinine 0.73 mg/dL      Sodium 137 mmol/L      Potassium 3.9 mmol/L      Chloride 101 mmol/L      CO2 23.4 mmol/L      Calcium 8.5 mg/dL      eGFR Non African Amer 75 mL/min/1.73      BUN/Creatinine Ratio 19.2     Anion Gap 12.6 mmol/L     Narrative:       GFR Normal >60  Chronic Kidney Disease <60  Kidney Failure <15    CBC & Differential [[id number]] Collected:  12/05/19 0504    Specimen:  Blood Updated:  12/05/19 0623    Narrative:       The following orders were created for panel order CBC & Differential.  Procedure                               Abnormality         Status                     ---------                               -----------         ------                     CBC Auto Differential[[id number]]        Abnormal            Final result                 Please view results for these tests on the individual orders.    CBC Auto Differential [[id number]]  (Abnormal) Collected:  12/05/19 0504    Specimen:  Blood Updated:  12/05/19 0623     WBC 5.99 10*3/mm3      RBC 2.81 10*6/mm3      Hemoglobin 9.1 g/dL      Hematocrit 27.5 %      MCV 97.9 fL      MCH 32.4 pg      MCHC 33.1 g/dL      RDW 14.6 %      RDW-SD 52.9 fl      MPV 9.8 fL      Platelets 203 10*3/mm3      Neutrophil % 72.3 %      Lymphocyte % 16.0 %      Monocyte % 9.0 %      Eosinophil % 2.2 %      Basophil % 0.2 %      Immature Grans % 0.3 %      Neutrophils, Absolute 4.33 10*3/mm3      Lymphocytes, Absolute 0.96 10*3/mm3      Monocytes, Absolute 0.54 10*3/mm3      Eosinophils, Absolute 0.13 10*3/mm3      Basophils, Absolute 0.01 10*3/mm3      Immature Grans, Absolute 0.02 10*3/mm3      nRBC 0.0 /100 WBC     POC Glucose Once [[id number]]  (Normal) Collected:  12/05/19 0604     Specimen:  Blood Updated:  12/05/19 0606     Glucose 113 mg/dL     POC Glucose Once [[id number]]  (Abnormal) Collected:  12/04/19 2112    Specimen:  Blood Updated:  12/04/19 2116     Glucose 138 mg/dL     POC Glucose Once [[id number]]  (Abnormal) Collected:  12/04/19 1630    Specimen:  Blood Updated:  12/04/19 1634     Glucose 163 mg/dL     POC Glucose Once [[id number]]  (Normal) Collected:  12/04/19 1120    Specimen:  Blood Updated:  12/04/19 1125     Glucose 129 mg/dL     POC Glucose Once [[id number]]  (Normal) Collected:  12/04/19 0624    Specimen:  Blood Updated:  12/04/19 0625     Glucose 96 mg/dL     Digoxin Level [[id number]]  (Abnormal) Collected:  12/04/19 0428    Specimen:  Blood from Arm, Left Updated:  12/04/19 0550     Digoxin 1.50 ng/mL     Basic Metabolic Panel [[id number]]  (Normal) Collected:  12/04/19 0428    Specimen:  Blood from Arm, Left Updated:  12/04/19 0544     Glucose 94 mg/dL      BUN 16 mg/dL      Creatinine 0.65 mg/dL      Sodium 138 mmol/L      Potassium 3.5 mmol/L      Chloride 101 mmol/L      CO2 24.3 mmol/L      Calcium 8.5 mg/dL      eGFR Non African Amer 86 mL/min/1.73      BUN/Creatinine Ratio 24.6     Anion Gap 12.7 mmol/L     Narrative:       GFR Normal >60  Chronic Kidney Disease <60  Kidney Failure <15    CBC Auto Differential [[id number]]  (Abnormal) Collected:  12/04/19 0428    Specimen:  Blood from Arm, Left Updated:  12/04/19 0520     WBC 5.62 10*3/mm3      RBC 2.70 10*6/mm3      Hemoglobin 8.8 g/dL      Hematocrit 26.2 %      MCV 97.0 fL      MCH 32.6 pg      MCHC 33.6 g/dL      RDW 14.6 %      RDW-SD 52.0 fl      MPV 9.8 fL      Platelets 190 10*3/mm3      Neutrophil % 73.4 %      Lymphocyte % 14.9 %      Monocyte % 8.4 %      Eosinophil % 2.5 %      Basophil % 0.4 %      Immature Grans % 0.4 %      Neutrophils, Absolute 4.13 10*3/mm3      Lymphocytes, Absolute 0.84 10*3/mm3      Monocytes, Absolute 0.47 10*3/mm3      Eosinophils, Absolute 0.14 10*3/mm3      Basophils, Absolute  0.02 10*3/mm3      Immature Grans, Absolute 0.02 10*3/mm3      nRBC 0.0 /100 WBC     POC Glucose Once [[id number]]  (Abnormal) Collected:  12/03/19 2043    Specimen:  Blood Updated:  12/03/19 2045     Glucose 246 mg/dL     POC Glucose Once [[id number]]  (Abnormal) Collected:  12/03/19 1616    Specimen:  Blood Updated:  12/03/19 1618     Glucose 134 mg/dL     POC Glucose Once [[id number]]  (Abnormal) Collected:  12/03/19 1453    Specimen:  Blood Updated:  12/03/19 1453     Glucose 135 mg/dL     Digoxin Level [[id number]]  (Abnormal) Collected:  12/03/19 1038    Specimen:  Blood Updated:  12/03/19 1137     Digoxin 1.50 ng/mL     CBC & Differential [[id number]] Collected:  12/03/19 1038    Specimen:  Blood Updated:  12/03/19 1124    Narrative:       The following orders were created for panel order CBC & Differential.  Procedure                               Abnormality         Status                     ---------                               -----------         ------                     CBC Auto Differential[[id number]]        Abnormal            Final result                 Please view results for these tests on the individual orders.    CBC Auto Differential [[id number]]  (Abnormal) Collected:  12/03/19 1038    Specimen:  Blood Updated:  12/03/19 1124     WBC 7.22 10*3/mm3      RBC 2.99 10*6/mm3      Hemoglobin 10.2 g/dL      Hematocrit 29.8 %      MCV 99.7 fL      MCH 34.1 pg      MCHC 34.2 g/dL      RDW 14.4 %      RDW-SD 52.1 fl      MPV 9.6 fL      Platelets 196 10*3/mm3      Neutrophil % 79.8 %      Lymphocyte % 10.9 %      Monocyte % 6.8 %      Eosinophil % 1.9 %      Basophil % 0.3 %      Immature Grans % 0.3 %      Neutrophils, Absolute 5.76 10*3/mm3      Lymphocytes, Absolute 0.79 10*3/mm3      Monocytes, Absolute 0.49 10*3/mm3      Eosinophils, Absolute 0.14 10*3/mm3      Basophils, Absolute 0.02 10*3/mm3      Immature Grans, Absolute 0.02 10*3/mm3      nRBC 0.0 /100 WBC     Basic Metabolic Panel [[id number]]   "(Abnormal) Collected:  12/03/19 1038    Specimen:  Blood Updated:  12/03/19 1108     Glucose 131 mg/dL      BUN 20 mg/dL      Creatinine 0.88 mg/dL      Sodium 137 mmol/L      Potassium 3.8 mmol/L      Chloride 101 mmol/L      CO2 22.5 mmol/L      Calcium 8.9 mg/dL      eGFR Non African Amer 60 mL/min/1.73      BUN/Creatinine Ratio 22.7     Anion Gap 13.5 mmol/L     Narrative:       GFR Normal >60  Chronic Kidney Disease <60  Kidney Failure <15        /87 (BP Location: Right arm, Patient Position: Lying)   Pulse 76   Temp 97.7 °F (36.5 °C) (Oral)   Resp 16   Ht 160 cm (63\")   Wt 71.5 kg (157 lb 10.1 oz)   SpO2 94%   BMI 27.92 kg/m²     Discharge Exam:  General Appearance:    Alert, cooperative, no distress                          Head:    Normocephalic, without obvious abnormality, atraumatic                          Eyes:                            Throat:   Lips, tongue, gums normal                          Neck:   Supple, symmetrical, trachea midline, no JVD                        Lungs:     Clear to auscultation bilaterally, respirations unlabored                Chest Wall:    No tenderness or deformity                        Heart:    Regular rate and rhythm, S1 and S2 normal, no murmur,no  Rub or gallop                  Abdomen:     Soft, non-tender, bowel sounds active, no masses, no organomegaly                  Extremities:   Extremities normal, atraumatic, no cyanosis or edema                             Skin:   Skin is warm and dry,  no rashes or palpable lesions                  Neurologic:   no focal deficits noted     Disposition:  Skilled nursing facility  Diet Order   Procedures   • Diet Soft Texture; Whole Foods; Consistent Carbohydrate     Patient Instructions:      Discharge Medications      New Medications      Instructions Start Date   acetaminophen 325 MG tablet  Commonly known as:  TYLENOL   650 mg, Oral, Every 4 Hours PRN      insulin glargine 100 UNIT/ML injection  Commonly " known as:  LANTUS   8 Units, Subcutaneous, Nightly      insulin lispro 100 UNIT/ML injection  Commonly known as:  humaLOG   0-7 Units, Subcutaneous, 4 Times Daily With Meals & Nightly      traMADol 50 MG tablet  Commonly known as:  ULTRAM   25 mg, Oral, Every 6 Hours PRN         Continue These Medications      Instructions Start Date   allopurinol 100 MG tablet  Commonly known as:  ZYLOPRIM   100 mg, Oral, Daily      aspirin 81 MG tablet   Oral, Daily      atorvastatin 20 MG tablet  Commonly known as:  LIPITOR   20 mg, Oral, Daily      digoxin 125 MCG tablet  Commonly known as:  LANOXIN   TAKE 1 TABLET BY MOUTH ONCE DAILY      metoprolol tartrate 50 MG tablet  Commonly known as:  LOPRESSOR   50 mg, Oral, 2 Times Daily      vitamin B-12 1000 MCG tablet  Commonly known as:  CYANOCOBALAMIN   1,000 mcg, Oral, Daily         Stop These Medications    furosemide 40 MG tablet  Commonly known as:  LASIX     hydrALAZINE 25 MG tablet  Commonly known as:  APRESOLINE     irbesartan 300 MG tablet  Commonly known as:  AVAPRO     metFORMIN 500 MG tablet  Commonly known as:  GLUCOPHAGE     pioglitazone 30 MG tablet  Commonly known as:  ACTOS     potassium chloride 10 MEQ CR capsule  Commonly known as:  MICRO-K          Future Appointments   Date Time Provider Department Center   1/3/2020  9:45 AM Radha Valentine, APRN [facility] None   3/25/2020 10:30 AM PACEART IN OFFICE, L[facility] None   11/4/2020 11:00 AM LAB CHAIR 3 LYNDSEY MAYNARD  LAB [facility]   11/4/2020 11:40 AM Harlan Traore II, MD [facility]     Follow-up Information     Stanislaw Esteban MD Follow up in 1 week(s).    Specialty:  Hand Surgery  Contact information:  [address]  [facility] [address] [facility] [address]  [place] [id number] [phone]             Harlan Arthur MD Follow up.    Specialty:  Family Medicine  Why:  After released from rehab  Contact information:  [address] [facility] [address]  [place] [id number] [phone]                 Discharge Order (From  admission, onward)    Start     Ordered    12/06/19 1209  Discharge patient  Once     Expected Discharge Date:  12/06/19    Discharge Disposition:  Skilled Nursing Facility (DC - External)    Physician of Record for Attribution - Please select from Treatment Team:  DONNY BROOKE [[id number]]    Review needed by CMO to determine Physician of Record:  No       Question Answer Comment   Physician of Record for Attribution - Please select from Treatment Team DONNY BROOKE    Review needed by CMO to determine Physician of Record No        12/06/19 1212          Total time spent discharging patient including evaluation,post hospitalization follow up,  medication and post hospitalization instructions and education total time exceeds 30 minutes.    Signed:  Donny Brooke MD  12/6/2019  12:13 PM

## 2019-12-06 NOTE — PROGRESS NOTES
Continued Stay Note  [facility]     Patient Name: Val Jeronimo  MRN: [id number]  Today's Date: 12/6/2019    Admit Date: 12/3/2019    Discharge Plan     Row Name 12/06/19 1402       Plan    Plan  [facility] family transport    Patient/Family in Agreement with Plan  yes    Plan Comments  Spoke with [facility] pt approved and bed available today. Per RN, Family will transport. Packet given to YANELIS Sahni. ermias barraza/[facility]p        Discharge Codes    No documentation.       Expected Discharge Date and Time     Expected Discharge Date Expected Discharge Time    Dec 6, 2019             Maria Teresa Forde RN

## 2019-12-09 NOTE — PROGRESS NOTES
Case Management Discharge Note      Final Note: skilled bed at [facility]    Provided post acute provider list?: Yes  Post Acute Provider Lists: Nursing Home, Home Health  Post Acuite Provider List: Delivered  Delivered To: Patient, Support Person  Support Person: Savita  Method of Delivery: In person    Destination - Selection Complete      Service Provider Request Status Selected Services Address Phone Number Fax Number    [facility] Selected Skilled Nursing [address] [facility] [address] [phone]      Durable Medical Equipment      No service has been selected for the patient.      Dialysis/Infusion      No service has been selected for the patient.      Home Medical Care      No service has been selected for the patient.      Therapy      No service has been selected for the patient.      Community Resources      No service has been selected for the patient.        Transportation Services  Private: Car    Final Discharge Disposition Code: 03 - skilled nursing facility (SNF)

## 2019-12-12 ENCOUNTER — EPISODE CHANGES (OUTPATIENT)
Dept: CASE MANAGEMENT | Facility: OTHER | Age: 84
End: 2019-12-12

## 2019-12-13 ENCOUNTER — OFFICE VISIT (OUTPATIENT)
Dept: FAMILY MEDICINE CLINIC | Facility: CLINIC | Age: 84
End: 2019-12-13

## 2019-12-13 VITALS
DIASTOLIC BLOOD PRESSURE: 93 MMHG | WEIGHT: 139 LBS | HEIGHT: 63 IN | TEMPERATURE: 97.6 F | OXYGEN SATURATION: 100 % | HEART RATE: 69 BPM | SYSTOLIC BLOOD PRESSURE: 159 MMHG | BODY MASS INDEX: 24.63 KG/M2

## 2019-12-13 DIAGNOSIS — L98.499 SKIN ULCER OF FEMALE BREAST (HCC): Primary | ICD-10-CM

## 2019-12-13 PROCEDURE — 99213 OFFICE O/P EST LOW 20 MIN: CPT | Performed by: NURSE PRACTITIONER

## 2019-12-13 NOTE — PROGRESS NOTES
Subjective   Val Jeronimo is a 89 y.o. female.     History of Present Illness   Patient presents to office accompanied by her daughter for skin irritation to right breast.  Patient states he has been there for a month. She reports there is no pain or itching.  She has been using neosporin ointment with no improvement.  The facility she lives in noticed it last week and notified her daughter.  Denies fever or chills.         The following portions of the patient's history were reviewed and updated as appropriate: allergies, current medications, past family history, past medical history, past social history, past surgical history and problem list.    Review of Systems   Constitutional: Negative for chills and fever.   Cardiovascular: Negative for chest pain.   Skin: Positive for wound. Negative for rash.   Hematological: Negative for adenopathy.       Objective   Physical Exam   Constitutional: She is oriented to person, place, and time. She appears well-developed and well-nourished.   Pulmonary/Chest: Effort normal. She exhibits no mass, no tenderness, no edema and no retraction. Right breast exhibits skin change. Right breast exhibits no mass, no nipple discharge and no tenderness. There is breast bleeding.   No mass or lymphadenopathy appreciated to right breast or axillary area    Neurological: She is alert and oriented to person, place, and time.   Skin:   See image.     Psychiatric: She has a normal mood and affect. Judgment normal.   Nursing note and vitals reviewed.          Assessment/Plan   Val was seen today for laceration.    Diagnoses and all orders for this visit:    Skin ulcer of female breast  -     Ambulatory Referral to Dermatology        Patient sent directly over to Dr. Ma office at their instruction.

## 2020-01-03 ENCOUNTER — OFFICE VISIT (OUTPATIENT)
Dept: NEUROSURGERY | Facility: CLINIC | Age: 85
End: 2020-01-03

## 2020-01-03 ENCOUNTER — HOSPITAL ENCOUNTER (OUTPATIENT)
Dept: GENERAL RADIOLOGY | Facility: HOSPITAL | Age: 85
Discharge: HOME | End: 2020-01-03
Admitting: PHYSICIAN ASSISTANT

## 2020-01-03 VITALS
HEART RATE: 64 BPM | BODY MASS INDEX: 25.42 KG/M2 | RESPIRATION RATE: 16 BRPM | SYSTOLIC BLOOD PRESSURE: 100 MMHG | DIASTOLIC BLOOD PRESSURE: 56 MMHG | HEIGHT: 62 IN

## 2020-01-03 DIAGNOSIS — S12.591D OTHER CLOSED NONDISPLACED FRACTURE OF SIXTH CERVICAL VERTEBRA WITH ROUTINE HEALING, SUBSEQUENT ENCOUNTER: Primary | ICD-10-CM

## 2020-01-03 DIAGNOSIS — S12.591D OTHER CLOSED NONDISPLACED FRACTURE OF SIXTH CERVICAL VERTEBRA WITH ROUTINE HEALING, SUBSEQUENT ENCOUNTER: ICD-10-CM

## 2020-01-03 PROCEDURE — 99213 OFFICE O/P EST LOW 20 MIN: CPT | Performed by: NURSE PRACTITIONER

## 2020-01-03 PROCEDURE — 72040 X-RAY EXAM NECK SPINE 2-3 VW: CPT

## 2020-01-21 ENCOUNTER — EPISODE CHANGES (OUTPATIENT)
Dept: CASE MANAGEMENT | Facility: OTHER | Age: 85
End: 2020-01-21

## 2020-01-31 ENCOUNTER — TELEPHONE (OUTPATIENT)
Dept: CARDIOLOGY | Facility: CLINIC | Age: 85
End: 2020-01-31

## 2020-02-05 ENCOUNTER — TELEPHONE (OUTPATIENT)
Dept: CARDIOLOGY | Facility: CLINIC | Age: 85
End: 2020-02-05

## 2020-02-05 NOTE — TELEPHONE ENCOUNTER
P [phone]  F [phone]    Oksana at [facility] called for a copy of pts med list.  Faxed and confirmed.      [facility] RADHAA

## 2020-02-12 ENCOUNTER — EPISODE CHANGES (OUTPATIENT)
Dept: CASE MANAGEMENT | Facility: OTHER | Age: 85
End: 2020-02-12

## 2020-02-20 RX ORDER — ONDANSETRON 4 MG/1
4 TABLET, ORALLY DISINTEGRATING ORAL EVERY 8 HOURS PRN
Qty: 20 TABLET | Refills: 1 | Status: SHIPPED | OUTPATIENT
Start: 2020-02-20 | End: 2020-09-23

## 2020-02-20 NOTE — PROGRESS NOTES
Her nursing home called and she is got a GI virus.  I did order Zofran oral disintegrating 4 mg every 8 hours and it to [facility] will     Facility is implementing clear liquid diet and watching her glucose she has been started on Gatorade

## 2020-02-25 ENCOUNTER — HOSPITAL ENCOUNTER (OUTPATIENT)
Dept: CT IMAGING | Facility: HOSPITAL | Age: 85
Discharge: HOME | End: 2020-02-25
Admitting: NURSE PRACTITIONER

## 2020-02-25 DIAGNOSIS — S12.591D OTHER CLOSED NONDISPLACED FRACTURE OF SIXTH CERVICAL VERTEBRA WITH ROUTINE HEALING, SUBSEQUENT ENCOUNTER: ICD-10-CM

## 2020-02-25 PROCEDURE — 72125 CT NECK SPINE W/O DYE: CPT

## 2020-03-03 ENCOUNTER — OFFICE VISIT (OUTPATIENT)
Dept: NEUROSURGERY | Facility: CLINIC | Age: 85
End: 2020-03-03

## 2020-03-03 ENCOUNTER — HOSPITAL ENCOUNTER (OUTPATIENT)
Dept: GENERAL RADIOLOGY | Facility: HOSPITAL | Age: 85
Discharge: HOME | End: 2020-03-03
Admitting: NURSE PRACTITIONER

## 2020-03-03 VITALS
DIASTOLIC BLOOD PRESSURE: 50 MMHG | WEIGHT: 123 LBS | HEART RATE: 68 BPM | SYSTOLIC BLOOD PRESSURE: 100 MMHG | HEIGHT: 62 IN | BODY MASS INDEX: 22.63 KG/M2

## 2020-03-03 DIAGNOSIS — J90 PLEURAL EFFUSION: Primary | ICD-10-CM

## 2020-03-03 DIAGNOSIS — S12.591D OTHER CLOSED NONDISPLACED FRACTURE OF SIXTH CERVICAL VERTEBRA WITH ROUTINE HEALING, SUBSEQUENT ENCOUNTER: ICD-10-CM

## 2020-03-03 DIAGNOSIS — J90 PLEURAL EFFUSION: ICD-10-CM

## 2020-03-03 PROCEDURE — 99214 OFFICE O/P EST MOD 30 MIN: CPT | Performed by: NURSE PRACTITIONER

## 2020-03-03 PROCEDURE — 71046 X-RAY EXAM CHEST 2 VIEWS: CPT

## 2020-03-03 RX ORDER — PIOGLITAZONEHYDROCHLORIDE 30 MG/1
TABLET ORAL
COMMUNITY
Start: 2020-01-25 | End: 2020-07-29

## 2020-03-03 RX ORDER — ESCITALOPRAM OXALATE 10 MG/1
TABLET ORAL
COMMUNITY
Start: 2020-02-21 | End: 2021-03-24 | Stop reason: SDUPTHER

## 2020-03-03 RX ORDER — MIRTAZAPINE 7.5 MG/1
TABLET, FILM COATED ORAL
COMMUNITY
Start: 2020-02-20 | End: 2021-03-24

## 2020-06-01 ENCOUNTER — OFFICE VISIT (OUTPATIENT)
Dept: FAMILY MEDICINE CLINIC | Facility: CLINIC | Age: 85
End: 2020-06-01

## 2020-06-01 DIAGNOSIS — N18.30 STAGE 3 CHRONIC KIDNEY DISEASE (HCC): ICD-10-CM

## 2020-06-01 DIAGNOSIS — E11.3393 TYPE 2 DIABETES MELLITUS WITH BOTH EYES AFFECTED BY MODERATE NONPROLIFERATIVE RETINOPATHY WITHOUT MACULAR EDEMA, WITHOUT LONG-TERM CURRENT USE OF INSULIN (HCC): ICD-10-CM

## 2020-06-01 DIAGNOSIS — I48.19 PERSISTENT ATRIAL FIBRILLATION (HCC): ICD-10-CM

## 2020-06-01 DIAGNOSIS — E78.49 OTHER HYPERLIPIDEMIA: ICD-10-CM

## 2020-06-01 DIAGNOSIS — M1A.09X0 IDIOPATHIC CHRONIC GOUT OF MULTIPLE SITES WITHOUT TOPHUS: ICD-10-CM

## 2020-06-01 DIAGNOSIS — I10 ESSENTIAL HYPERTENSION: Primary | ICD-10-CM

## 2020-06-01 PROCEDURE — 99442 PR PHYS/QHP TELEPHONE EVALUATION 11-20 MIN: CPT | Performed by: FAMILY MEDICINE

## 2020-06-01 RX ORDER — ALLOPURINOL 100 MG/1
100 TABLET ORAL DAILY
Qty: 90 TABLET | Refills: 1 | Status: SHIPPED | OUTPATIENT
Start: 2020-06-01 | End: 2020-09-23 | Stop reason: SDUPTHER

## 2020-06-01 RX ORDER — FUROSEMIDE 40 MG/1
TABLET ORAL
COMMUNITY
Start: 2020-03-25 | End: 2020-10-19

## 2020-06-01 RX ORDER — ATORVASTATIN CALCIUM 20 MG/1
20 TABLET, FILM COATED ORAL DAILY
Qty: 90 TABLET | Refills: 1 | Status: SHIPPED | OUTPATIENT
Start: 2020-06-01 | End: 2020-09-23 | Stop reason: SDUPTHER

## 2020-06-01 RX ORDER — METOPROLOL TARTRATE 50 MG/1
50 TABLET, FILM COATED ORAL 2 TIMES DAILY
Qty: 180 TABLET | Refills: 1 | Status: SHIPPED | OUTPATIENT
Start: 2020-06-01 | End: 2020-09-23 | Stop reason: SDUPTHER

## 2020-06-01 NOTE — ASSESSMENT & PLAN NOTE
Diabetes is unchanged.   Continue current treatment regimen.  Diabetes will be reassessed in 1 month.

## 2020-06-01 NOTE — ASSESSMENT & PLAN NOTE
Hypertension is unchanged.  Continue current treatment regimen.  Blood pressure will be reassessed in 4 weeks.

## 2020-06-01 NOTE — PROGRESS NOTES
Mode of Visit: Telephone  You have chosen to receive care through a telephone visit today. Do you consent to use a telephone visit for your medical care today? Yes   Val Jeronimo confirmed that she was in [place] at the time of this telephonic visit.   The visit included telephone interaction. No technical issues occurred during this visit.   more than 10 minutes up to 20 minutes.   Subjective       Chief Complaint   Patient presents with   • Hypertension     follow up, no issues         HPI:      Val Jeronimo is a 89 y.o. female who presents to  [address] today to refill medicines.  She was uncertain of all of her medicines.  She is overdue for labs.  She feels well.  She cannot definitively tell me what her diabetes medications are.  We will schedule labs and short-term follow-up visit.  She is also due for Medicare wellness visit.  Blood pressure apparently is well controlled.  No recent gout attacks.  Diabetes controlled pending lab results.    I have reviewed and updated her medications, medical history and problem list during today's office visit.     Social History     Tobacco Use   • Smoking status: Never Smoker   • Smokeless tobacco: Never Used   Substance Use Topics   • Alcohol use: Yes     Alcohol/week: 1.0 standard drinks     Types: 1 Glasses of wine per week     Frequency: Monthly or less     Drinks per session: 1 or 2     Comment: occ/caffeine use       Review of Systems      PE:   Objective   There were no vitals taken for this visit.    There is no height or weight on file to calculate BMI.    Physical Exam     Data Reviewed:                      A/P:     Assessment/Plan   Diagnoses and all orders for this visit:    1. Essential hypertension (Primary)  Assessment & Plan:  Hypertension is unchanged.  Continue current treatment regimen.  Blood pressure will be reassessed in 4 weeks.    Orders:  -     Comprehensive Metabolic Panel  -      CBC & Differential  -     TSH  -     metoprolol tartrate (LOPRESSOR) 50 MG tablet; Take 1 tablet by mouth 2 (Two) Times a Day for 180 days.  Dispense: 180 tablet; Refill: 1    2. Type 2 diabetes mellitus with both eyes affected by moderate nonproliferative retinopathy without macular edema, without long-term current use of insulin (CMS/[facility])  Assessment & Plan:  Diabetes is unchanged.   Continue current treatment regimen.  Diabetes will be reassessed in 1 month.    Orders:  -     Hemoglobin A1c  -     MicroAlbumin, Urine, Random - Urine, Clean Catch    3. Idiopathic chronic gout of multiple sites without tophus  Assessment & Plan:  The current medical regimen is effective;  continue present plan and medications.      Orders:  -     Uric Acid  -     allopurinol (ZYLOPRIM) 100 MG tablet; Take 1 tablet by mouth Daily for 180 days.  Dispense: 90 tablet; Refill: 1    4. Stage 3 chronic kidney disease (CMS/[facility])    5. Persistent atrial fibrillation    6. Other hyperlipidemia  -     Lipid Panel With / Chol / HDL Ratio  -     CK  -     atorvastatin (LIPITOR) 20 MG tablet; Take 1 tablet by mouth Daily for 180 days.  Dispense: 90 tablet; Refill: 1        Follow up:    Return in about 1 month (around 7/1/2020) for Medicare Wellness and regular visit, 30 minutes.

## 2020-07-20 ENCOUNTER — CLINICAL SUPPORT NO REQUIREMENTS (OUTPATIENT)
Dept: CARDIOLOGY | Facility: CLINIC | Age: 85
End: 2020-07-20

## 2020-07-20 DIAGNOSIS — I49.5 SICK SINUS SYNDROME (HCC): Primary | ICD-10-CM

## 2020-07-20 PROCEDURE — 93280 PM DEVICE PROGR EVAL DUAL: CPT | Performed by: INTERNAL MEDICINE

## 2020-07-29 RX ORDER — PIOGLITAZONEHYDROCHLORIDE 30 MG/1
30 TABLET ORAL DAILY
Qty: 30 TABLET | Refills: 0 | Status: SHIPPED | OUTPATIENT
Start: 2020-07-29 | End: 2020-09-23 | Stop reason: SDUPTHER

## 2020-08-28 ENCOUNTER — CLINICAL SUPPORT NO REQUIREMENTS (OUTPATIENT)
Dept: CARDIOLOGY | Facility: CLINIC | Age: 85
End: 2020-08-28

## 2020-08-28 DIAGNOSIS — I49.5 SICK SINUS SYNDROME (HCC): Primary | ICD-10-CM

## 2020-08-28 PROCEDURE — 93280 PM DEVICE PROGR EVAL DUAL: CPT | Performed by: INTERNAL MEDICINE

## 2020-09-08 RX ORDER — PIOGLITAZONEHYDROCHLORIDE 30 MG/1
TABLET ORAL
Qty: 30 TABLET | Refills: 0 | OUTPATIENT
Start: 2020-09-08

## 2020-09-14 ENCOUNTER — TELEPHONE (OUTPATIENT)
Dept: FAMILY MEDICINE CLINIC | Facility: CLINIC | Age: 85
End: 2020-09-14

## 2020-09-23 ENCOUNTER — OFFICE VISIT (OUTPATIENT)
Dept: FAMILY MEDICINE CLINIC | Facility: CLINIC | Age: 85
End: 2020-09-23

## 2020-09-23 VITALS
HEIGHT: 62 IN | BODY MASS INDEX: 23.74 KG/M2 | OXYGEN SATURATION: 97 % | SYSTOLIC BLOOD PRESSURE: 155 MMHG | TEMPERATURE: 96.9 F | HEART RATE: 69 BPM | RESPIRATION RATE: 16 BRPM | WEIGHT: 129 LBS | DIASTOLIC BLOOD PRESSURE: 83 MMHG

## 2020-09-23 DIAGNOSIS — E11.3393 TYPE 2 DIABETES MELLITUS WITH BOTH EYES AFFECTED BY MODERATE NONPROLIFERATIVE RETINOPATHY WITHOUT MACULAR EDEMA, WITHOUT LONG-TERM CURRENT USE OF INSULIN (HCC): ICD-10-CM

## 2020-09-23 DIAGNOSIS — I10 ESSENTIAL HYPERTENSION: Primary | ICD-10-CM

## 2020-09-23 DIAGNOSIS — N18.30 STAGE 3 CHRONIC KIDNEY DISEASE (HCC): ICD-10-CM

## 2020-09-23 DIAGNOSIS — E78.49 OTHER HYPERLIPIDEMIA: ICD-10-CM

## 2020-09-23 DIAGNOSIS — M1A.09X0 IDIOPATHIC CHRONIC GOUT OF MULTIPLE SITES WITHOUT TOPHUS: ICD-10-CM

## 2020-09-23 PROBLEM — S80.811A ABRASION OF RIGHT LOWER EXTREMITY: Status: RESOLVED | Noted: 2018-08-02 | Resolved: 2020-09-23

## 2020-09-23 PROBLEM — Z87.81 HISTORY OF CERVICAL FRACTURE: Status: ACTIVE | Noted: 2019-12-03

## 2020-09-23 PROCEDURE — 99214 OFFICE O/P EST MOD 30 MIN: CPT | Performed by: FAMILY MEDICINE

## 2020-09-23 RX ORDER — ATORVASTATIN CALCIUM 20 MG/1
20 TABLET, FILM COATED ORAL DAILY
Qty: 90 TABLET | Refills: 1 | Status: SHIPPED | OUTPATIENT
Start: 2020-09-23 | End: 2021-03-24 | Stop reason: SDUPTHER

## 2020-09-23 RX ORDER — PIOGLITAZONEHYDROCHLORIDE 30 MG/1
30 TABLET ORAL DAILY
Qty: 90 TABLET | Refills: 1 | Status: SHIPPED | OUTPATIENT
Start: 2020-09-23 | End: 2021-03-24 | Stop reason: SDUPTHER

## 2020-09-23 RX ORDER — METOPROLOL TARTRATE 50 MG/1
50 TABLET, FILM COATED ORAL 2 TIMES DAILY
Qty: 180 TABLET | Refills: 1 | Status: SHIPPED | OUTPATIENT
Start: 2020-09-23 | End: 2021-03-24 | Stop reason: SDUPTHER

## 2020-09-23 RX ORDER — ALLOPURINOL 100 MG/1
100 TABLET ORAL DAILY
Qty: 90 TABLET | Refills: 1 | Status: SHIPPED | OUTPATIENT
Start: 2020-09-23 | End: 2021-03-24 | Stop reason: SDUPTHER

## 2020-09-23 NOTE — PROGRESS NOTES
"   Subjective       Chief Complaint   Patient presents with   • Hypertension     med refill    • Hyperlipidemia         HPI:       This patient presents for follow-up of current medical conditions.  Type 2 diabetes stable pending lab results.  She has run out of pioglitazone over the past 7 days.  She continues to take metformin twice daily.  While she was in the hospital over December she was on insulin.  Stage III chronic kidney disease stable pending lab results.  No recent gout attacks.  Blood pressure is stable.  Her systolic is above 150.  They had stopped irbesartan while in the hospital because of her fall.  Her blood pressure is adequate if systolic is below 160.  History of Present Illness       Review of Systems   Constitutional: Negative for fatigue.   Cardiovascular: Negative for chest pain.   Neurological: Negative for dizziness and headaches.      I have reviewed the ROS as documented above. Harlan Arthur MD     This patient's ACP documentation is up to date, and there's nothing further left to document.        PE:   Objective   /83   Pulse 69   Temp 96.9 °F (36.1 °C) (Tympanic)   Resp 16   Ht 157.5 cm (62\")   Wt 58.5 kg (129 lb)   SpO2 97%   BMI 23.59 kg/m²  Body mass index is 23.59 kg/m².    Physical Exam  Vitals signs reviewed.   Constitutional:       General: She is not in acute distress.  Eyes:      General: Lids are normal.      Conjunctiva/sclera: Conjunctivae normal.   Neck:      Vascular: No carotid bruit.      Trachea: No tracheal deviation.   Cardiovascular:      Rate and Rhythm: Normal rate and regular rhythm.      Heart sounds: Normal heart sounds. No murmur.   Pulmonary:      Effort: Pulmonary effort is normal.      Breath sounds: Normal breath sounds.   Musculoskeletal:      Comments: Ambulation with roller walker.   Skin:     General: Skin is warm and dry.   Neurological:      Mental Status: She is alert. She is not disoriented.   Psychiatric:         Speech: Speech " normal.         Behavior: Behavior normal. Behavior is cooperative.         Procedures      Data Reviewed:                  A/P:     Assessment/Plan   Diagnoses and all orders for this visit:    1. Essential hypertension (Primary)  Assessment & Plan:  Hypertension is unchanged.  Continue current treatment regimen.  Blood pressure will be reassessed at the next regular appointment.    Orders:  -     metoprolol tartrate (LOPRESSOR) 50 MG tablet; Take 1 tablet by mouth 2 (Two) Times a Day for 180 days.  Dispense: 180 tablet; Refill: 1    2. Idiopathic chronic gout of multiple sites without tophus  Assessment & Plan:  The current medical regimen is effective;  continue present plan and medications.        Orders:  -     allopurinol (ZYLOPRIM) 100 MG tablet; Take 1 tablet by mouth Daily for 180 days.  Dispense: 90 tablet; Refill: 1    3. Other hyperlipidemia  -     atorvastatin (LIPITOR) 20 MG tablet; Take 1 tablet by mouth Daily for 180 days.  Dispense: 90 tablet; Refill: 1    4. Type 2 diabetes mellitus with both eyes affected by moderate nonproliferative retinopathy without macular edema, without long-term current use of insulin (CMS/[facility])  Assessment & Plan:  Diabetes is unchanged.   Continue current treatment regimen.  Diabetes will be reassessed in 6 months.    Orders:  -     metFORMIN (GLUCOPHAGE) 500 MG tablet; Take 1 tablet by mouth 2 (Two) Times a Day With Meals for 180 days.  Dispense: 180 tablet; Refill: 1  -     pioglitazone (ACTOS) 30 MG tablet; Take 1 tablet by mouth Daily for 180 days.  Dispense: 90 tablet; Refill: 1    5. Stage 3 chronic kidney disease (CMS/[facility])  Assessment & Plan:  Renal condition is unchanged.  Continue current treatment regimen.  Renal condition will be reassessed in 6 months.          Follow up:    Return in about 6 months (around 3/23/2021) for Medicare Wellness and regular visit, 30 minutes.

## 2020-09-24 LAB
ALBUMIN SERPL-MCNC: 4.7 G/DL (ref 3.5–5.2)
ALBUMIN/GLOB SERPL: 2 G/DL
ALP SERPL-CCNC: 75 U/L (ref 39–117)
ALT SERPL-CCNC: 17 U/L (ref 1–33)
AST SERPL-CCNC: 22 U/L (ref 1–32)
BASOPHILS # BLD AUTO: 0.04 10*3/MM3 (ref 0–0.2)
BASOPHILS NFR BLD AUTO: 0.8 % (ref 0–1.5)
BILIRUB SERPL-MCNC: 1 MG/DL (ref 0–1.2)
BUN SERPL-MCNC: 21 MG/DL (ref 8–23)
BUN/CREAT SERPL: 20.2 (ref 7–25)
CALCIUM SERPL-MCNC: 9.6 MG/DL (ref 8.2–9.6)
CHLORIDE SERPL-SCNC: 101 MMOL/L (ref 98–107)
CHOLEST SERPL-MCNC: 129 MG/DL (ref 0–200)
CHOLEST/HDLC SERPL: 2.02 {RATIO}
CK SERPL-CCNC: 75 U/L (ref 20–180)
CO2 SERPL-SCNC: 27.3 MMOL/L (ref 22–29)
CREAT SERPL-MCNC: 1.04 MG/DL (ref 0.57–1)
EOSINOPHIL # BLD AUTO: 0.24 10*3/MM3 (ref 0–0.4)
EOSINOPHIL NFR BLD AUTO: 4.6 % (ref 0.3–6.2)
ERYTHROCYTE [DISTWIDTH] IN BLOOD BY AUTOMATED COUNT: 14.5 % (ref 12.3–15.4)
GLOBULIN SER CALC-MCNC: 2.3 GM/DL
GLUCOSE SERPL-MCNC: 154 MG/DL (ref 65–99)
HBA1C MFR BLD: 6.1 % (ref 4.8–5.6)
HCT VFR BLD AUTO: 34.3 % (ref 34–46.6)
HDLC SERPL-MCNC: 64 MG/DL (ref 40–60)
HGB BLD-MCNC: 11.1 G/DL (ref 12–15.9)
IMM GRANULOCYTES # BLD AUTO: 0.01 10*3/MM3 (ref 0–0.05)
IMM GRANULOCYTES NFR BLD AUTO: 0.2 % (ref 0–0.5)
LDLC SERPL CALC-MCNC: 51 MG/DL (ref 0–100)
LYMPHOCYTES # BLD AUTO: 1.06 10*3/MM3 (ref 0.7–3.1)
LYMPHOCYTES NFR BLD AUTO: 20.2 % (ref 19.6–45.3)
MCH RBC QN AUTO: 32 PG (ref 26.6–33)
MCHC RBC AUTO-ENTMCNC: 32.4 G/DL (ref 31.5–35.7)
MCV RBC AUTO: 98.8 FL (ref 79–97)
MICROALBUMIN UR-MCNC: 26 UG/ML
MONOCYTES # BLD AUTO: 0.35 10*3/MM3 (ref 0.1–0.9)
MONOCYTES NFR BLD AUTO: 6.7 % (ref 5–12)
NEUTROPHILS # BLD AUTO: 3.54 10*3/MM3 (ref 1.7–7)
NEUTROPHILS NFR BLD AUTO: 67.5 % (ref 42.7–76)
NRBC BLD AUTO-RTO: 0 /100 WBC (ref 0–0.2)
PLATELET # BLD AUTO: 179 10*3/MM3 (ref 140–450)
POTASSIUM SERPL-SCNC: 4.1 MMOL/L (ref 3.5–5.2)
PROT SERPL-MCNC: 7 G/DL (ref 6–8.5)
RBC # BLD AUTO: 3.47 10*6/MM3 (ref 3.77–5.28)
SODIUM SERPL-SCNC: 141 MMOL/L (ref 136–145)
TRIGL SERPL-MCNC: 72 MG/DL (ref 0–150)
TSH SERPL DL<=0.005 MIU/L-ACNC: 2.98 UIU/ML (ref 0.27–4.2)
URATE SERPL-MCNC: 5.8 MG/DL (ref 2.4–5.7)
VLDLC SERPL CALC-MCNC: 14.4 MG/DL
WBC # BLD AUTO: 5.24 10*3/MM3 (ref 3.4–10.8)

## 2020-10-19 ENCOUNTER — TELEPHONE (OUTPATIENT)
Dept: CARDIOLOGY | Facility: CLINIC | Age: 85
End: 2020-10-19

## 2020-10-19 RX ORDER — FUROSEMIDE 40 MG/1
TABLET ORAL
Qty: 135 TABLET | Refills: 3 | Status: SHIPPED | OUTPATIENT
Start: 2020-10-19 | End: 2021-12-20

## 2020-10-29 ENCOUNTER — DOCUMENTATION (OUTPATIENT)
Dept: ONCOLOGY | Facility: CLINIC | Age: 85
End: 2020-10-29

## 2020-10-29 ENCOUNTER — TELEPHONE (OUTPATIENT)
Dept: ONCOLOGY | Facility: CLINIC | Age: 85
End: 2020-10-29

## 2020-10-29 NOTE — PROGRESS NOTES
Fine with me    ===View-only below this line===  ----- Message -----  From: Michelle Tineo  Sent: 10/29/2020  12:27 PM EDT  To: Harlan Zavala II, MD    SEE BELOW:   IS IT OK TO CHANGE TO TELEPHONE VISIT?      Caller: CAROL BRENNAN     Relationship to patient: DAUGHTER     Best call back number: [phone]     PT IS STAYING AT THE [facility] AND HAS AN APPT WITH DR ZAVALA ON 11/4. CALLER ASKS IF PT CAN HAVE HER LABS FOR THE APPT DONE AT THE FACILITY AND HER F/U WITH DR CODE CHANGED TO A TELEPHONE VISIT SO THAT THE PT DOES NOT HAVE TO COME OUT.     [facility]  CLINICAL DIRECTOR: FAITH FERRARI  PH: [phone]  FX: [phone]

## 2020-10-29 NOTE — TELEPHONE ENCOUNTER
Caller: CAROL BRENNAN    Relationship to patient: DAUGHTER    Best call back number: [phone]    PT IS STAYING AT THE [facility] AND HAS AN APPT WITH DR ZAVALA ON 11/4. CALLER ASKS IF PT CAN HAVE HER LABS FOR THE APPT DONE AT THE FACILITY AND HER F/U WITH DR CODE CHANGED TO A TELEPHONE VISIT SO THAT THE PT DOES NOT HAVE TO COME OUT.    [facility]  CLINICAL DIRECTOR: FAITH FERRARI  PH: [phone]  FX: [phone]

## 2020-11-04 ENCOUNTER — APPOINTMENT (OUTPATIENT)
Dept: LAB | Facility: HOSPITAL | Age: 85
End: 2020-11-04

## 2020-11-04 ENCOUNTER — TELEPHONE (OUTPATIENT)
Dept: ONCOLOGY | Facility: CLINIC | Age: 85
End: 2020-11-04

## 2020-11-04 ENCOUNTER — OFFICE VISIT (OUTPATIENT)
Dept: ONCOLOGY | Facility: CLINIC | Age: 85
End: 2020-11-04

## 2020-11-04 DIAGNOSIS — D64.9 ANEMIA, UNSPECIFIED TYPE: Primary | ICD-10-CM

## 2020-11-04 PROCEDURE — 99443 PR PHYS/QHP TELEPHONE EVALUATION 21-30 MIN: CPT | Performed by: INTERNAL MEDICINE

## 2020-11-04 NOTE — TELEPHONE ENCOUNTER
----- Message from Harlan Traore II, MD sent at 11/4/2020 12:22 PM EST -----  The last message had a typo.  This is the correct message: (For details, not for details)    In about 3 weeks at her [facility] facility ([facility]): CBC, RBC folate, LDH, haptoglobin, direct Tania test, CMP, ferritin, serum iron, TIBC, iron percent saturation    MD, telephone visit, in about 4 weeks

## 2020-11-04 NOTE — TELEPHONE ENCOUNTER
Called and reviewed the telephone visit, told her she may want to call nursing facility wk prior and make sure that done - df

## 2020-11-04 NOTE — PROGRESS NOTES
.     REASON FOR FOLLOWUP :   Macrocytosis, anemia    HISTORY OF PRESENT ILLNESS:  The patient is a 89 y.o. year old female  who is here for follow-up with the above-mentioned history.    Admitted December 2019 due to fall resulting in cervical spine fracture.  Hb was around 9    Denies bleeding, chest pain, shortness of breath    Past Medical History:   Diagnosis Date   • Arthritis    • Atrial fibrillation (CMS/HCC)    • CHF (congestive heart failure) (CMS/HCC)    • Degeneration of thoracic intervertebral disc    • Diabetes mellitus (CMS/HCC)    • Gout    • H/O Pulmonary nodule 10/2013   • History of anemia 10/2013   • History of cellulitis 2016    Finger of left hand   • Hyperlipidemia    • Hypertension    • Odontoid fracture (CMS/HCC)    • Osteoporosis    • Pacemaker      Past Surgical History:   Procedure Laterality Date   • NECK SURGERY  10/2013    Broken neck repair   • PACEMAKER IMPLANTATION     • SKIN CANCER EXCISION      12/20/19       HEMATOLOGIC/ONCOLOGIC HISTORY:  (History from previous dates can be found in the separate document.)    MEDICATIONS    Current Outpatient Medications:   •  acetaminophen (TYLENOL) 325 MG tablet, Take 2 tablets by mouth Every 4 (Four) Hours As Needed for Mild Pain ., Disp: , Rfl:   •  allopurinol (ZYLOPRIM) 100 MG tablet, Take 1 tablet by mouth Daily for 180 days., Disp: 90 tablet, Rfl: 1  •  aspirin 81 MG tablet, Take by mouth daily., Disp: , Rfl:   •  atorvastatin (LIPITOR) 20 MG tablet, Take 1 tablet by mouth Daily for 180 days., Disp: 90 tablet, Rfl: 1  •  digoxin (LANOXIN) 125 MCG tablet, TAKE 1 TABLET BY MOUTH ONCE DAILY, Disp: 90 tablet, Rfl: 3  •  escitalopram (LEXAPRO) 10 MG tablet, , Disp: , Rfl:   •  furosemide (LASIX) 40 MG tablet, TAKE ONE AND ONE-HALF (1 & 1/2) TABLET BY MOUTH DAILY, Disp: 135 tablet, Rfl: 3  •  metFORMIN (GLUCOPHAGE) 500 MG tablet, Take 1 tablet by mouth 2 (Two) Times a Day With Meals for 180 days., Disp: 180 tablet, Rfl: 1  •  metoprolol  tartrate (LOPRESSOR) 50 MG tablet, Take 1 tablet by mouth 2 (Two) Times a Day for 180 days., Disp: 180 tablet, Rfl: 1  •  mirtazapine (REMERON) 7.5 MG tablet, , Disp: , Rfl:   •  pioglitazone (ACTOS) 30 MG tablet, Take 1 tablet by mouth Daily for 180 days., Disp: 90 tablet, Rfl: 1  •  vitamin B-12 (CYANOCOBALAMIN) 1000 MCG tablet, Take 1,000 mcg by mouth Daily., Disp: , Rfl:     ALLERGIES:     Allergies   Allergen Reactions   • Advil [Ibuprofen] Unknown - Low Severity     .       SOCIAL HISTORY:       Social History     Socioeconomic History   • Marital status:      Spouse name: Noah   • Number of children: 5   • Years of education: High School   • Highest education level: Not on file   Occupational History   • Occupation: Homemaker     Employer: RETIRED   Tobacco Use   • Smoking status: Never Smoker   • Smokeless tobacco: Never Used   • Tobacco comment:     Substance and Sexual Activity   • Alcohol use: Yes     Alcohol/week: 1.0 standard drinks     Types: 1 Glasses of wine per week     Frequency: Monthly or less     Drinks per session: 1 or 2     Comment: occ/caffeine use   • Drug use: No   • Sexual activity: Defer   Lifestyle   • Physical activity     Days per week: 0 days     Minutes per session: 0 min   • Stress: Not at all   Relationships   • Social connections     Talks on phone: More than three times a week     Gets together: More than three times a week     Attends [employer] service: 1 to 4 times per year     Active member of club or organization: Yes     Attends meetings of clubs or organizations: 1 to 4 times per year     Relationship status:          FAMILY HISTORY:  Family History   Problem Relation Age of Onset   • Heart disease Father    • Kidney cancer Sister 61        Renal cell carcinoma   • Leukemia Son 44        CML   • Colon cancer Daughter 64   • COPD Sister         non smoker       REVIEW OF SYSTEMS:  Review of Systems   Constitutional: Negative for activity change.   HENT:  Negative for nosebleeds and trouble swallowing.    Respiratory: Negative for shortness of breath and wheezing.    Cardiovascular: Negative for chest pain and palpitations.   Gastrointestinal: Negative for constipation, diarrhea and nausea.   Genitourinary: Negative for dysuria and hematuria.   Musculoskeletal: Negative for arthralgias and myalgias.   Skin: Negative for rash and wound.   Neurological: Negative for seizures and syncope.   Hematological: Negative for adenopathy. Does not bruise/bleed easily.   Psychiatric/Behavioral: Negative for confusion.              There were no vitals filed for this visit.  Current Status 11/6/2019   ECOG score 1      PHYSICAL EXAM:    No exam as this was a telephone visit    RECENT LABS:        WBC   Date Value Ref Range Status   09/23/2020 5.24 3.40 - 10.80 10*3/mm3 Final   12/06/2019 7.50 3.40 - 10.80 10*3/mm3 Final   12/05/2019 5.99 3.40 - 10.80 10*3/mm3 Final   12/04/2019 5.62 3.40 - 10.80 10*3/mm3 Final   12/03/2019 7.22 3.40 - 10.80 10*3/mm3 Final   11/06/2019 5.74 3.40 - 10.80 10*3/mm3 Final   05/22/2019 6.60 3.40 - 10.80 10*3/mm3 Final   05/08/2019 6.18 3.40 - 10.80 10*3/mm3 Final   11/12/2018 6.97 4.00 - 10.00 10*3/mm3 Final   08/09/2018 5.24 4.50 - 10.70 10*3/mm3 Final   06/20/2018 7.78 4.00 - 10.00 10*3/mm3 Final   06/06/2018 6.76 4.00 - 10.00 10*3/mm3 Final   05/02/2018 5.59 4.50 - 10.70 10*3/mm3 Final   11/20/2017 6.56 4.50 - 10.70 10*3/mm3 Final   09/21/2017 6.73 4.50 - 10.70 10*3/mm3 Final   03/06/2017 7.08 4.50 - 10.70 10*3/mm3 Final   09/12/2016 5.86 4.50 - 10.70 10*3/mm3 Final   03/15/2016 6.8 3.4 - 10.8 x10E3/uL Final   04/28/2014 9.70 4.50 - 10.70 K/Cumm Final   01/29/2014 4.04 (L) 4.50 - 10.70 K/Cumm Final   01/28/2014 4.27 (L) 4.50 - 10.70 K/Cumm Final   01/26/2014 6.84 4.50 - 10.70 K/Cumm Final     Hemoglobin   Date Value Ref Range Status   09/23/2020 11.1 (L) 12.0 - 15.9 g/dL Final   12/06/2019 8.8 (L) 12.0 - 15.9 g/dL Final   12/05/2019 9.1 (L) 12.0 -  15.9 g/dL Final   12/04/2019 8.8 (L) 12.0 - 15.9 g/dL Final   12/03/2019 10.2 (L) 12.0 - 15.9 g/dL Final   11/06/2019 11.6 (L) 12.0 - 15.9 g/dL Final   05/22/2019 11.5 (L) 12.0 - 15.9 g/dL Final   05/08/2019 11.4 (L) 12.0 - 15.9 g/dL Final   11/12/2018 12.8 11.5 - 14.9 g/dL Final   08/09/2018 10.9 (L) 11.9 - 15.5 g/dL Final   06/20/2018 11.2 (L) 11.5 - 14.9 g/dL Final   06/06/2018 10.9 (L) 11.5 - 14.9 g/dL Final   05/02/2018 10.8 (L) 11.9 - 15.5 g/dL Final   11/20/2017 12.3 11.9 - 15.5 g/dL Final   09/21/2017 12.4 11.9 - 15.5 g/dL Final   03/06/2017 12.7 11.9 - 15.5 g/dL Final   09/12/2016 12.5 11.9 - 15.5 g/dL Final   03/15/2016 11.7 11.1 - 15.9 g/dL Final   04/28/2014 12.1 11.9 - 15.5 g/dL Final   01/29/2014 12.6 11.9 - 15.5 g/dL Final   01/28/2014 12.6 11.9 - 15.5 g/dL Final   01/26/2014 12.3 11.9 - 15.5 g/dL Final     Platelets   Date Value Ref Range Status   09/23/2020 179 140 - 450 10*3/mm3 Final   12/06/2019 173 140 - 450 10*3/mm3 Final   12/05/2019 203 140 - 450 10*3/mm3 Final   12/04/2019 190 140 - 450 10*3/mm3 Final   12/03/2019 196 140 - 450 10*3/mm3 Final   11/06/2019 197 140 - 450 10*3/mm3 Final   05/22/2019 184 140 - 450 10*3/mm3 Final   05/08/2019 174 140 - 450 10*3/mm3 Final   11/12/2018 221 150 - 375 10*3/mm3 Final   08/09/2018 187 140 - 500 10*3/mm3 Final   06/20/2018 216 150 - 375 10*3/mm3 Final   06/06/2018 189 150 - 375 10*3/mm3 Final   05/02/2018 250 140 - 500 10*3/mm3 Final   11/20/2017 226 140 - 500 10*3/mm3 Final   09/21/2017 227 140 - 500 10*3/mm3 Final   03/06/2017 268 140 - 500 10*3/mm3 Final   09/12/2016 221 140 - 500 10*3/mm3 Final   03/15/2016 220 150 - 379 x10E3/uL Final   04/28/2014 216 140 - 500 K/Cumm Final   01/29/2014 150 140 - 500 K/Cumm Final   01/28/2014 151 140 - 500 K/Cumm Final   01/26/2014 166 140 - 500 K/Cumm Final       Assessment/Plan   There are no diagnoses linked to this encounter.    *Anemia.  Hb 10.8 on 5/2/18.  Hb normal on 11/20/17 and on prior  labs.  Unremarkable: SPEP, S CATRACHITO, haptoglobin, LDH, total bilirubin, cait.  Ferritin not robust at 40, but 20% iron sat and retic Hb high, not low.  Therefore, doubt this is iron deficiency.  · Hb previously 11-12 through 11/6/2019.  · Hb was around 9 during early December 2019 for day hospitalization for fall with cervical spine fracture.  · Hb 9.9 on [facility] labs, 8/9/2020.  · Hb 11.1 on [facility] labs, 9/23/2020.  · Hb 9 on NH labs, 11/3/2020.  · Because of this lower Hb value, check some additional anemia labs and we will follow her more closely.    *Leukocytopenia  · WBC previously normal through 12/6/2019  · On 8/9/2020, WBC 3.3  · On 9/23/2020, WBC 5.2  · On 11/3/2020, WBC 3.8    *b12 Borderline low, but normal at 269 with normal range 211-246 on 6/6/18.  · Oral B12 500 µg daily, started 6/20/18.  B12, 11/3/2020, >1500    *Creatinine was 1.24 on 6/6/18.  On 5/2/18 and on prior labs, creatinine mostly 1.  Creatinine repeated 6/20/18:  1.26.  Creatinine on 8/9/2020, 1.1    *Prior hyperkalemia.  Mild.  At that time, I asked the nurses to call her and make sure she is no longer taking her potassium supplement.  If she is, stop it.  Defer to her PCP if her Diovan stopped and any further workup or treatment of the renal insufficiency.  Potassium 3.6 on 5/8/2019    *Macrocytosis.  Gradually worsening.  MCV improved from 101-106, down to 97 with oral B12.  MCV 99.6    *Pulmonary nodule, initially seen in 10/28/13.    CT April 2014 revealed some calcification, suggesting this was granulomatous disease.  Plan was to repeat one final CT around April 2015 and if stable know further follow-up CTs.  Patient did not return for follow-up.  At this point, if this were cancer, I would expect it to be apparent by now.  She is 88 years old.  Patient and daughter do not want any further follow-up of this.    Plan  · In about 3 weeks at her nursing facility ([facility]): CBC, RBC folate, LDH, haptoglobin, direct Cait test, CMP,  ferritin, serum iron, TIBC, iron percent saturation  · MD, telephone visit, in about 4 weeks.  (Must have those lab results)    Prior plan was:  · Continue oral 500 µg B12, daily.  · M.D. CBC B12 level 1 year (patient and daughter would like his few visits is possible to try and minimize doctor's visits)    Daughter assisted with history.    You have chosen to receive care through a telephone visit today. Do you consent to use a telephone visit for your medical care today? Yes     This visit has been rescheduled as a phone visit to comply with patient safety concerns in accordance with CDC recommendations. Total time of discussion was 21 minutes.    36605 is 5-10 minutes  91342 is 11-20 minutes  50362 is 21 or more minutes

## 2020-11-06 RX ORDER — DIGOXIN 125 MCG
TABLET ORAL
Qty: 30 TABLET | Refills: 0 | Status: SHIPPED | OUTPATIENT
Start: 2020-11-06 | End: 2020-12-14

## 2020-11-19 ENCOUNTER — OFFICE VISIT (OUTPATIENT)
Dept: CARDIOLOGY | Facility: CLINIC | Age: 85
End: 2020-11-19

## 2020-11-19 VITALS
SYSTOLIC BLOOD PRESSURE: 130 MMHG | DIASTOLIC BLOOD PRESSURE: 70 MMHG | WEIGHT: 132 LBS | HEART RATE: 74 BPM | HEIGHT: 62 IN | BODY MASS INDEX: 24.29 KG/M2

## 2020-11-19 DIAGNOSIS — I48.19 PERSISTENT ATRIAL FIBRILLATION (HCC): Primary | ICD-10-CM

## 2020-11-19 DIAGNOSIS — Z95.0 HISTORY OF PACEMAKER: ICD-10-CM

## 2020-11-19 DIAGNOSIS — I10 ESSENTIAL HYPERTENSION: ICD-10-CM

## 2020-11-19 PROCEDURE — 99214 OFFICE O/P EST MOD 30 MIN: CPT | Performed by: INTERNAL MEDICINE

## 2020-11-19 PROCEDURE — 93000 ELECTROCARDIOGRAM COMPLETE: CPT | Performed by: INTERNAL MEDICINE

## 2020-11-19 NOTE — PROGRESS NOTES
Subjective:     Encounter Date:11/19/20        Patient ID: Val Jeronimo is a 89 y.o. female.    Chief Complaint:  Atrial Fibrillation  Presents for follow-up visit. Symptoms are negative for palpitations and tachycardia. The symptoms have been stable. Past medical history includes atrial fibrillation.   Hypertension  This is a chronic problem. The problem is controlled. Pertinent negatives include no palpitations.       89-year-old female who presents today for reevaluation.  Patient denies chest pain shortness of breath palpitations lightheadedness swelling and fatigue.      Review of Systems   Cardiovascular: Negative for palpitations.   Skin: Positive for poor wound healing.   All other systems reviewed and are negative.        ECG 12 Lead    Date/Time: 11/19/2020 11:36 AM  Performed by: Ananth Yoo MD  Authorized by: Ananth Yoo MD   Comparison: compared with previous ECG from 12/3/2019  Comparison to previous ECG: Underlying rhythm was A. fib she did not appear to be dual chamber paced.  Rhythm: paced    Clinical impression: abnormal EKG               Objective:     Physical Exam   Constitutional: She is oriented to person, place, and time. She appears well-developed.   HENT:   Head: Normocephalic.   Eyes: Conjunctivae are normal.   Neck: Normal range of motion.   Cardiovascular: Normal rate, regular rhythm and normal heart sounds.   Pulmonary/Chest: Breath sounds normal.   Abdominal: Soft. Bowel sounds are normal.   Musculoskeletal: Normal range of motion.         General: No edema.   Neurological: She is alert and oriented to person, place, and time.   Skin: Skin is warm and dry.   Psychiatric: She has a normal mood and affect. Her behavior is normal.   Vitals reviewed.      Lab Review:       Assessment:          Diagnosis Plan   1. Persistent atrial fibrillation     2. Essential hypertension     3. History of pacemaker            Plan:          1. History of chronic atrial  fibrillation. Heart rate looks good.  2. History of sick sinus syndrome status post pacemaker.  3.  Hypertension blood pressures excellent   4.  Continue same follow-up in 1 year or sooner if she develops issues    Atrial Fibrillation and Atrial Flutter  Assessment  • The patient has permanent atrial fibrillation  • This is non-valvular in etiology  • The patient's CHADS2-VASc score is 4  • A [id number] score of 2 or more is considered a high risk for a thromboembolic event  • Warfarin not prescribed for medical reasons    Plan  • Continue in atrial fibrillation with rate control  • Continue aspirin for antithrombotic therapy, bleeding issues discussed  • Continue beta blocker for rate control             Vitals signs reviewed.   Constitutional:       Appearance: Well-developed.   Eyes:      Conjunctiva/sclera: Conjunctivae normal.   HENT:      Head: Normocephalic.   Neck:      Musculoskeletal: Normal range of motion.   Pulmonary:      Breath sounds: Normal breath sounds.   Cardiovascular:      Normal rate. Regular rhythm. Normal heart sounds.   Edema:     Peripheral edema absent.   Abdominal:      General: Bowel sounds are normal.      Palpations: Abdomen is soft.   Musculoskeletal: Normal range of motion.   Skin:     General: Skin is warm and dry.   Neurological:      Mental Status: Alert and oriented to person, place, and time.   Psychiatric:         Mood and Affect: Mood and affect normal.         Behavior: Behavior normal.

## 2020-12-03 ENCOUNTER — OFFICE VISIT (OUTPATIENT)
Dept: ONCOLOGY | Facility: CLINIC | Age: 85
End: 2020-12-03

## 2020-12-03 DIAGNOSIS — D64.9 ANEMIA, UNSPECIFIED TYPE: Primary | ICD-10-CM

## 2020-12-03 PROCEDURE — 99441 PR PHYS/QHP TELEPHONE EVALUATION 5-10 MIN: CPT | Performed by: INTERNAL MEDICINE

## 2020-12-03 NOTE — PROGRESS NOTES
.     REASON FOR FOLLOWUP :   Macrocytosis, anemia    HISTORY OF PRESENT ILLNESS:  The patient is a 89 y.o. year old female  who is here for follow-up with the above-mentioned history.    Admitted December 2019 due to fall resulting in cervical spine fracture.  Hb was around 9    Denies bleeding, chest pain, shortness of breath    Past Medical History:   Diagnosis Date   • Arthritis    • Atrial fibrillation (CMS/HCC)    • CHF (congestive heart failure) (CMS/HCC)    • Degeneration of thoracic intervertebral disc    • Diabetes mellitus (CMS/HCC)    • Gout    • H/O Pulmonary nodule 10/2013   • History of anemia 10/2013   • History of cellulitis 2016    Finger of left hand   • Hyperlipidemia    • Hypertension    • Odontoid fracture (CMS/HCC)    • Osteoporosis    • Pacemaker      Past Surgical History:   Procedure Laterality Date   • NECK SURGERY  10/2013    Broken neck repair   • PACEMAKER IMPLANTATION     • SKIN CANCER EXCISION      12/20/19       HEMATOLOGIC/ONCOLOGIC HISTORY:  (History from previous dates can be found in the separate document.)    MEDICATIONS    Current Outpatient Medications:   •  acetaminophen (TYLENOL) 325 MG tablet, Take 2 tablets by mouth Every 4 (Four) Hours As Needed for Mild Pain ., Disp: , Rfl:   •  allopurinol (ZYLOPRIM) 100 MG tablet, Take 1 tablet by mouth Daily for 180 days., Disp: 90 tablet, Rfl: 1  •  aspirin 81 MG tablet, Take by mouth daily., Disp: , Rfl:   •  atorvastatin (LIPITOR) 20 MG tablet, Take 1 tablet by mouth Daily for 180 days., Disp: 90 tablet, Rfl: 1  •  digoxin (LANOXIN) 125 MCG tablet, TAKE ONE TABLET BY MOUTH DAILY, Disp: 30 tablet, Rfl: 0  •  escitalopram (LEXAPRO) 10 MG tablet, , Disp: , Rfl:   •  furosemide (LASIX) 40 MG tablet, TAKE ONE AND ONE-HALF (1 & 1/2) TABLET BY MOUTH DAILY, Disp: 135 tablet, Rfl: 3  •  metFORMIN (GLUCOPHAGE) 500 MG tablet, Take 1 tablet by mouth 2 (Two) Times a Day With Meals for 180 days., Disp: 180 tablet, Rfl: 1  •  metoprolol  tartrate (LOPRESSOR) 50 MG tablet, Take 1 tablet by mouth 2 (Two) Times a Day for 180 days., Disp: 180 tablet, Rfl: 1  •  mirtazapine (REMERON) 7.5 MG tablet, , Disp: , Rfl:   •  pioglitazone (ACTOS) 30 MG tablet, Take 1 tablet by mouth Daily for 180 days., Disp: 90 tablet, Rfl: 1  •  vitamin B-12 (CYANOCOBALAMIN) 1000 MCG tablet, Take 1,000 mcg by mouth Daily., Disp: , Rfl:     ALLERGIES:     Allergies   Allergen Reactions   • Advil [Ibuprofen] Unknown - Low Severity     .       SOCIAL HISTORY:       Social History     Socioeconomic History   • Marital status:      Spouse name: Noah   • Number of children: 5   • Years of education: High School   • Highest education level: Not on file   Occupational History   • Occupation: Homemaker     Employer: RETIRED   Tobacco Use   • Smoking status: Never Smoker   • Smokeless tobacco: Never Used   • Tobacco comment:     Substance and Sexual Activity   • Alcohol use: Yes     Alcohol/week: 1.0 standard drinks     Types: 1 Glasses of wine per week     Frequency: Monthly or less     Drinks per session: 1 or 2     Comment: occ/caffeine use   • Drug use: No   • Sexual activity: Defer   Lifestyle   • Physical activity     Days per week: 0 days     Minutes per session: 0 min   • Stress: Not at all   Relationships   • Social connections     Talks on phone: More than three times a week     Gets together: More than three times a week     Attends [employer] service: 1 to 4 times per year     Active member of club or organization: Yes     Attends meetings of clubs or organizations: 1 to 4 times per year     Relationship status:          FAMILY HISTORY:  Family History   Problem Relation Age of Onset   • Heart disease Father    • Kidney cancer Sister 61        Renal cell carcinoma   • Leukemia Son 44        CML   • Colon cancer Daughter 64   • COPD Sister         non smoker       REVIEW OF SYSTEMS:  Review of Systems   Constitutional: Negative for activity change.   HENT:  Negative for nosebleeds and trouble swallowing.    Respiratory: Negative for shortness of breath and wheezing.    Cardiovascular: Negative for chest pain and palpitations.   Gastrointestinal: Negative for constipation, diarrhea and nausea.   Genitourinary: Negative for dysuria and hematuria.   Musculoskeletal: Negative for arthralgias and myalgias.   Skin: Negative for rash and wound.   Neurological: Negative for seizures and syncope.   Hematological: Negative for adenopathy. Does not bruise/bleed easily.   Psychiatric/Behavioral: Negative for confusion.              There were no vitals filed for this visit.  Current Status 11/6/2019   ECOG score 1      PHYSICAL EXAM:    No exam as this was a telephone visit    RECENT LABS:        WBC   Date Value Ref Range Status   09/23/2020 5.24 3.40 - 10.80 10*3/mm3 Final   12/06/2019 7.50 3.40 - 10.80 10*3/mm3 Final   12/05/2019 5.99 3.40 - 10.80 10*3/mm3 Final   12/04/2019 5.62 3.40 - 10.80 10*3/mm3 Final   12/03/2019 7.22 3.40 - 10.80 10*3/mm3 Final   11/06/2019 5.74 3.40 - 10.80 10*3/mm3 Final   05/22/2019 6.60 3.40 - 10.80 10*3/mm3 Final   05/08/2019 6.18 3.40 - 10.80 10*3/mm3 Final   11/12/2018 6.97 4.00 - 10.00 10*3/mm3 Final   08/09/2018 5.24 4.50 - 10.70 10*3/mm3 Final   06/20/2018 7.78 4.00 - 10.00 10*3/mm3 Final   06/06/2018 6.76 4.00 - 10.00 10*3/mm3 Final   05/02/2018 5.59 4.50 - 10.70 10*3/mm3 Final   11/20/2017 6.56 4.50 - 10.70 10*3/mm3 Final   09/21/2017 6.73 4.50 - 10.70 10*3/mm3 Final   03/06/2017 7.08 4.50 - 10.70 10*3/mm3 Final   09/12/2016 5.86 4.50 - 10.70 10*3/mm3 Final   03/15/2016 6.8 3.4 - 10.8 x10E3/uL Final   04/28/2014 9.70 4.50 - 10.70 K/Cumm Final   01/29/2014 4.04 (L) 4.50 - 10.70 K/Cumm Final   01/28/2014 4.27 (L) 4.50 - 10.70 K/Cumm Final   01/26/2014 6.84 4.50 - 10.70 K/Cumm Final     Hemoglobin   Date Value Ref Range Status   09/23/2020 11.1 (L) 12.0 - 15.9 g/dL Final   12/06/2019 8.8 (L) 12.0 - 15.9 g/dL Final   12/05/2019 9.1 (L) 12.0 -  15.9 g/dL Final   12/04/2019 8.8 (L) 12.0 - 15.9 g/dL Final   12/03/2019 10.2 (L) 12.0 - 15.9 g/dL Final   11/06/2019 11.6 (L) 12.0 - 15.9 g/dL Final   05/22/2019 11.5 (L) 12.0 - 15.9 g/dL Final   05/08/2019 11.4 (L) 12.0 - 15.9 g/dL Final   11/12/2018 12.8 11.5 - 14.9 g/dL Final   08/09/2018 10.9 (L) 11.9 - 15.5 g/dL Final   06/20/2018 11.2 (L) 11.5 - 14.9 g/dL Final   06/06/2018 10.9 (L) 11.5 - 14.9 g/dL Final   05/02/2018 10.8 (L) 11.9 - 15.5 g/dL Final   11/20/2017 12.3 11.9 - 15.5 g/dL Final   09/21/2017 12.4 11.9 - 15.5 g/dL Final   03/06/2017 12.7 11.9 - 15.5 g/dL Final   09/12/2016 12.5 11.9 - 15.5 g/dL Final   03/15/2016 11.7 11.1 - 15.9 g/dL Final   04/28/2014 12.1 11.9 - 15.5 g/dL Final   01/29/2014 12.6 11.9 - 15.5 g/dL Final   01/28/2014 12.6 11.9 - 15.5 g/dL Final   01/26/2014 12.3 11.9 - 15.5 g/dL Final     Platelets   Date Value Ref Range Status   09/23/2020 179 140 - 450 10*3/mm3 Final   12/06/2019 173 140 - 450 10*3/mm3 Final   12/05/2019 203 140 - 450 10*3/mm3 Final   12/04/2019 190 140 - 450 10*3/mm3 Final   12/03/2019 196 140 - 450 10*3/mm3 Final   11/06/2019 197 140 - 450 10*3/mm3 Final   05/22/2019 184 140 - 450 10*3/mm3 Final   05/08/2019 174 140 - 450 10*3/mm3 Final   11/12/2018 221 150 - 375 10*3/mm3 Final   08/09/2018 187 140 - 500 10*3/mm3 Final   06/20/2018 216 150 - 375 10*3/mm3 Final   06/06/2018 189 150 - 375 10*3/mm3 Final   05/02/2018 250 140 - 500 10*3/mm3 Final   11/20/2017 226 140 - 500 10*3/mm3 Final   09/21/2017 227 140 - 500 10*3/mm3 Final   03/06/2017 268 140 - 500 10*3/mm3 Final   09/12/2016 221 140 - 500 10*3/mm3 Final   03/15/2016 220 150 - 379 x10E3/uL Final   04/28/2014 216 140 - 500 K/Cumm Final   01/29/2014 150 140 - 500 K/Cumm Final   01/28/2014 151 140 - 500 K/Cumm Final   01/26/2014 166 140 - 500 K/Cumm Final       Assessment/Plan   There are no diagnoses linked to this encounter.    *Anemia.  Hb 10.8 on 5/2/18.  Hb normal on 11/20/17 and on prior  labs.  Unremarkable: SPEP, S CATRACHITO, haptoglobin, LDH, total bilirubin, cait.  Ferritin not robust at 40, but 20% iron sat and retic Hb high, not low.  Therefore, doubt this is iron deficiency.  · Hb previously 11-12 through 11/6/2019.  · Hb was around 9 during early December 2019 for day hospitalization for fall with cervical spine fracture.  · Hb 9.9 on [facility] labs, 8/9/2020.  · Hb 11.1 on [facility] labs, 9/23/2020.  · Hb 9 on NH labs, 11/3/2020.  · Because of this lower Hb value, check some additional anemia labs and we will follow her more closely.    *Leukocytopenia  · WBC previously normal through 12/6/2019  · On 8/9/2020, WBC 3.3  · On 9/23/2020, WBC 5.2  · On 11/3/2020, WBC 3.8    *b12 Borderline low, but normal at 269 with normal range 211-246 on 6/6/18.  · Oral B12 500 µg daily, started 6/20/18.  B12, 11/3/2020, >1500    *Creatinine was 1.24 on 6/6/18.  On 5/2/18 and on prior labs, creatinine mostly 1.  Creatinine repeated 6/20/18:  1.26.  Creatinine on 8/9/2020, 1.1    *Prior hyperkalemia.  Mild.  At that time, I asked the nurses to call her and make sure she is no longer taking her potassium supplement.  If she is, stop it.  Defer to her PCP if her Diovan stopped and any further workup or treatment of the renal insufficiency.  Potassium 3.6 on 5/8/2019    *Macrocytosis.  Gradually worsening.  MCV improved from 101-106, down to 97 with oral B12.  MCV 99.6    *Pulmonary nodule, initially seen in 10/28/13.    CT April 2014 revealed some calcification, suggesting this was granulomatous disease.  Plan was to repeat one final CT around April 2015 and if stable know further follow-up CTs.  Patient did not return for follow-up.  At this point, if this were cancer, I would expect it to be apparent by now.  She is 88 years old.  · Patient and daughter do not want any further follow-up of this.    11/24/2020, unremarkable: Creatinine, bilirubin  Await direct Cait and haptoglobin    Plan  · CBC in 1 month faxed  here.  · In 2 months, faxed here:  · CBC, RBC folate, LDH, haptoglobin, direct Tania test, CMP, B12 level, ferritin, serum iron, TIBC, iron percent saturation  · MD, telephone visit, 1 week after the 2-month labs (Must have those lab results)  · Patient's mobile number is [phone]    Prior plan was:  · Continue oral 500 µg B12, daily.  · M.D. CBC B12 level 1 year (patient and daughter would like asfew visits is possible to try and minimize doctor's visits)    Addendum 12/8/2020  · Finally received labs from nursing facility dated 11/24/2020: Ferritin 14  Therefore, iron deficiency anemia.    · Ferrous sulfate twice daily started 12/8/2020.  · Can discuss the possibility of GI evaluation at the next visit.  However, at age 89, and  prior request to not follow-up pulmonary nodule, I doubt patient and daughter will want to pursue a GI evaluation.     You have chosen to receive care through a telephone visit today. Do you consent to use a telephone visit for your medical care today? Yes     This visit has been rescheduled as a phone visit to comply with patient safety concerns in accordance with CDC recommendations. Total time of discussion was 6 minutes.    49382 is 5-10 minutes  26440 is 11-20 minutes  91285 is 21 or more minutes

## 2020-12-04 NOTE — PROGRESS NOTES
Called [place] this morning to request the labs that were not attached to the labs drawn and submitted from 11/24. Waiting on the results.

## 2020-12-08 RX ORDER — FERROUS SULFATE 325(65) MG
325 TABLET ORAL 2 TIMES DAILY
Qty: 60 TABLET | Refills: 3 | Status: SHIPPED | OUTPATIENT
Start: 2020-12-08 | End: 2021-06-03 | Stop reason: SDUPTHER

## 2020-12-10 PROBLEM — E11.3393 MODERATE NONPROLIFERATIVE DIABETIC RETINOPATHY OF BOTH EYES (HCC): Status: ACTIVE | Noted: 2020-12-10

## 2020-12-14 RX ORDER — DIGOXIN 125 MCG
TABLET ORAL
Qty: 90 TABLET | Refills: 3 | Status: SHIPPED | OUTPATIENT
Start: 2020-12-14

## 2021-02-11 ENCOUNTER — OFFICE VISIT (OUTPATIENT)
Dept: ONCOLOGY | Facility: CLINIC | Age: 86
End: 2021-02-11

## 2021-02-11 DIAGNOSIS — D64.9 ANEMIA, UNSPECIFIED TYPE: Primary | ICD-10-CM

## 2021-02-11 PROCEDURE — 99442 PR PHYS/QHP TELEPHONE EVALUATION 11-20 MIN: CPT | Performed by: INTERNAL MEDICINE

## 2021-02-11 NOTE — PROGRESS NOTES
.     REASON FOR FOLLOWUP :   Macrocytosis, anemia    HISTORY OF PRESENT ILLNESS:  The patient is a 89 y.o. year old female  who is here for follow-up with the above-mentioned history.    No new problems.  Feeling fine.  No bleeding.    Past Medical History:   Diagnosis Date   • Arthritis    • Atrial fibrillation (CMS/HCC)    • CHF (congestive heart failure) (CMS/HCC)    • Degeneration of thoracic intervertebral disc    • Diabetes mellitus (CMS/HCC)    • Gout    • H/O Pulmonary nodule 10/2013   • History of anemia 10/2013   • History of cellulitis 2016    Finger of left hand   • Hyperlipidemia    • Hypertension    • Odontoid fracture (CMS/HCC)    • Osteoporosis    • Pacemaker      Past Surgical History:   Procedure Laterality Date   • NECK SURGERY  10/2013    Broken neck repair   • PACEMAKER IMPLANTATION     • SKIN CANCER EXCISION      12/20/19       HEMATOLOGIC/ONCOLOGIC HISTORY:  (History from previous dates can be found in the separate document.)    MEDICATIONS    Current Outpatient Medications:   •  acetaminophen (TYLENOL) 325 MG tablet, Take 2 tablets by mouth Every 4 (Four) Hours As Needed for Mild Pain ., Disp: , Rfl:   •  allopurinol (ZYLOPRIM) 100 MG tablet, Take 1 tablet by mouth Daily for 180 days., Disp: 90 tablet, Rfl: 1  •  aspirin 81 MG tablet, Take by mouth daily., Disp: , Rfl:   •  atorvastatin (LIPITOR) 20 MG tablet, Take 1 tablet by mouth Daily for 180 days., Disp: 90 tablet, Rfl: 1  •  digoxin (LANOXIN) 125 MCG tablet, TAKE ONE TABLET BY MOUTH DAILY, Disp: 90 tablet, Rfl: 3  •  escitalopram (LEXAPRO) 10 MG tablet, , Disp: , Rfl:   •  ferrous sulfate 325 (65 FE) MG tablet, Take 1 tablet by mouth 2 (two) times a day., Disp: 60 tablet, Rfl: 3  •  furosemide (LASIX) 40 MG tablet, TAKE ONE AND ONE-HALF (1 & 1/2) TABLET BY MOUTH DAILY, Disp: 135 tablet, Rfl: 3  •  metFORMIN (GLUCOPHAGE) 500 MG tablet, Take 1 tablet by mouth 2 (Two) Times a Day With Meals for 180 days., Disp: 180 tablet, Rfl: 1  •   metoprolol tartrate (LOPRESSOR) 50 MG tablet, Take 1 tablet by mouth 2 (Two) Times a Day for 180 days., Disp: 180 tablet, Rfl: 1  •  mirtazapine (REMERON) 7.5 MG tablet, , Disp: , Rfl:   •  pioglitazone (ACTOS) 30 MG tablet, Take 1 tablet by mouth Daily for 180 days., Disp: 90 tablet, Rfl: 1  •  vitamin B-12 (CYANOCOBALAMIN) 1000 MCG tablet, Take 1,000 mcg by mouth Daily., Disp: , Rfl:     ALLERGIES:     Allergies   Allergen Reactions   • Advil [Ibuprofen] Unknown - Low Severity     .       SOCIAL HISTORY:       Social History     Socioeconomic History   • Marital status:      Spouse name: Noah   • Number of children: 5   • Years of education: High School   • Highest education level: Not on file   Occupational History   • Occupation: Homemaker     Employer: RETIRED   Tobacco Use   • Smoking status: Never Smoker   • Smokeless tobacco: Never Used   • Tobacco comment:     Substance and Sexual Activity   • Alcohol use: Yes     Alcohol/week: 1.0 standard drinks     Types: 1 Glasses of wine per week     Frequency: Monthly or less     Drinks per session: 1 or 2     Comment: occ/caffeine use   • Drug use: No   • Sexual activity: Defer   Lifestyle   • Physical activity     Days per week: 0 days     Minutes per session: 0 min   • Stress: Not at all   Relationships   • Social connections     Talks on phone: More than three times a week     Gets together: More than three times a week     Attends [employer] service: 1 to 4 times per year     Active member of club or organization: Yes     Attends meetings of clubs or organizations: 1 to 4 times per year     Relationship status:          FAMILY HISTORY:  Family History   Problem Relation Age of Onset   • Heart disease Father    • Kidney cancer Sister 61        Renal cell carcinoma   • Leukemia Son 44        CML   • Colon cancer Daughter 64   • COPD Sister         non smoker       REVIEW OF SYSTEMS:  Review of Systems   Constitutional: Negative for activity change.    HENT: Negative for nosebleeds and trouble swallowing.    Respiratory: Negative for shortness of breath and wheezing.    Cardiovascular: Negative for chest pain and palpitations.   Gastrointestinal: Negative for constipation, diarrhea and nausea.   Genitourinary: Negative for dysuria and hematuria.   Musculoskeletal: Negative for arthralgias and myalgias.   Skin: Negative for rash and wound.   Neurological: Negative for seizures and syncope.   Hematological: Negative for adenopathy. Does not bruise/bleed easily.   Psychiatric/Behavioral: Negative for confusion.              There were no vitals filed for this visit.  Current Status 11/6/2019   ECOG score 1      PHYSICAL EXAM:    No exam as this was a telephone visit    RECENT LABS:        WBC   Date Value Ref Range Status   09/23/2020 5.24 3.40 - 10.80 10*3/mm3 Final   12/06/2019 7.50 3.40 - 10.80 10*3/mm3 Final   12/05/2019 5.99 3.40 - 10.80 10*3/mm3 Final   12/04/2019 5.62 3.40 - 10.80 10*3/mm3 Final   12/03/2019 7.22 3.40 - 10.80 10*3/mm3 Final   11/06/2019 5.74 3.40 - 10.80 10*3/mm3 Final   05/22/2019 6.60 3.40 - 10.80 10*3/mm3 Final   05/08/2019 6.18 3.40 - 10.80 10*3/mm3 Final   11/12/2018 6.97 4.00 - 10.00 10*3/mm3 Final   08/09/2018 5.24 4.50 - 10.70 10*3/mm3 Final   06/20/2018 7.78 4.00 - 10.00 10*3/mm3 Final   06/06/2018 6.76 4.00 - 10.00 10*3/mm3 Final   05/02/2018 5.59 4.50 - 10.70 10*3/mm3 Final   11/20/2017 6.56 4.50 - 10.70 10*3/mm3 Final   09/21/2017 6.73 4.50 - 10.70 10*3/mm3 Final   03/06/2017 7.08 4.50 - 10.70 10*3/mm3 Final   09/12/2016 5.86 4.50 - 10.70 10*3/mm3 Final   03/15/2016 6.8 3.4 - 10.8 x10E3/uL Final   04/28/2014 9.70 4.50 - 10.70 K/Cumm Final   01/29/2014 4.04 (L) 4.50 - 10.70 K/Cumm Final   01/28/2014 4.27 (L) 4.50 - 10.70 K/Cumm Final   01/26/2014 6.84 4.50 - 10.70 K/Cumm Final     Hemoglobin   Date Value Ref Range Status   09/23/2020 11.1 (L) 12.0 - 15.9 g/dL Final   12/06/2019 8.8 (L) 12.0 - 15.9 g/dL Final   12/05/2019 9.1 (L)  12.0 - 15.9 g/dL Final   12/04/2019 8.8 (L) 12.0 - 15.9 g/dL Final   12/03/2019 10.2 (L) 12.0 - 15.9 g/dL Final   11/06/2019 11.6 (L) 12.0 - 15.9 g/dL Final   05/22/2019 11.5 (L) 12.0 - 15.9 g/dL Final   05/08/2019 11.4 (L) 12.0 - 15.9 g/dL Final   11/12/2018 12.8 11.5 - 14.9 g/dL Final   08/09/2018 10.9 (L) 11.9 - 15.5 g/dL Final   06/20/2018 11.2 (L) 11.5 - 14.9 g/dL Final   06/06/2018 10.9 (L) 11.5 - 14.9 g/dL Final   05/02/2018 10.8 (L) 11.9 - 15.5 g/dL Final   11/20/2017 12.3 11.9 - 15.5 g/dL Final   09/21/2017 12.4 11.9 - 15.5 g/dL Final   03/06/2017 12.7 11.9 - 15.5 g/dL Final   09/12/2016 12.5 11.9 - 15.5 g/dL Final   03/15/2016 11.7 11.1 - 15.9 g/dL Final   04/28/2014 12.1 11.9 - 15.5 g/dL Final   01/29/2014 12.6 11.9 - 15.5 g/dL Final   01/28/2014 12.6 11.9 - 15.5 g/dL Final   01/26/2014 12.3 11.9 - 15.5 g/dL Final     Platelets   Date Value Ref Range Status   09/23/2020 179 140 - 450 10*3/mm3 Final   12/06/2019 173 140 - 450 10*3/mm3 Final   12/05/2019 203 140 - 450 10*3/mm3 Final   12/04/2019 190 140 - 450 10*3/mm3 Final   12/03/2019 196 140 - 450 10*3/mm3 Final   11/06/2019 197 140 - 450 10*3/mm3 Final   05/22/2019 184 140 - 450 10*3/mm3 Final   05/08/2019 174 140 - 450 10*3/mm3 Final   11/12/2018 221 150 - 375 10*3/mm3 Final   08/09/2018 187 140 - 500 10*3/mm3 Final   06/20/2018 216 150 - 375 10*3/mm3 Final   06/06/2018 189 150 - 375 10*3/mm3 Final   05/02/2018 250 140 - 500 10*3/mm3 Final   11/20/2017 226 140 - 500 10*3/mm3 Final   09/21/2017 227 140 - 500 10*3/mm3 Final   03/06/2017 268 140 - 500 10*3/mm3 Final   09/12/2016 221 140 - 500 10*3/mm3 Final   03/15/2016 220 150 - 379 x10E3/uL Final   04/28/2014 216 140 - 500 K/Cumm Final   01/29/2014 150 140 - 500 K/Cumm Final   01/28/2014 151 140 - 500 K/Cumm Final   01/26/2014 166 140 - 500 K/Cumm Final       Assessment/Plan   There are no diagnoses linked to this encounter.    *Anemia.  Hb 10.8 on 5/2/18.  Hb normal on 11/20/17 and on prior  labs.  Unremarkable: SPEP, S CATRACHITO, haptoglobin, LDH, total bilirubin, cait.  Ferritin not robust at 40, but 20% iron sat and retic Hb high, not low.  Therefore, doubt this is iron deficiency.  · Hb previously 11-12 through 11/6/2019.  · Hb was around 9 during early December 2019 for day hospitalization for fall with cervical spine fracture.  · Hb 9.9 on NH labs, 8/9/2020.  · Hb 11.1 on [facility] labs, 9/23/2020.  · Hb 9 on NH labs, 11/3/2020.  · Because of this lower Hb value, check some additional anemia labs and we will follow her more closely.  · Hb 10.9, ferritin 32, 23% saturation serum folate 10.8, B12 >1500, , haptoglobin 61, bilirubin 1.1, creatinine 1.1 on 2/2/2021    *Iron deficiency  · 11/24/2020: Ferritin 14, Hb 9.4  · Therefore, iron deficiency anemia.    · Ferrous sulfate twice daily started 12/8/2020.  · Subsequent Hb, 10.9, on 2/2/2021 (improved)    *Source of iron deficiency  Patient declines a GI evaluation.    *Leukocytopenia  · WBC previously normal through 12/6/2019  · On 8/9/2020, WBC 3.3  · On 9/23/2020, WBC 5.2  · On 11/3/2020, WBC 3.8  · On 2/2/2021, WBC 4.7    *b12 Borderline low, but normal at 269 with normal range 211-246 on 6/6/18.  · Oral B12 500 µg daily, started 6/20/18.  B12,  >1500    *Creatinine was 1.24 on 6/6/18.  On 5/2/18 and on prior labs, creatinine mostly 1.  Creatinine repeated 6/20/18:  1.26.  Creatinine 1.1    *Prior hyperkalemia.  Mild.  At that time, I asked the nurses to call her and make sure she is no longer taking her potassium supplement.  If she is, stop it.  Defer to her PCP if her Diovan stopped and any further workup or treatment of the renal insufficiency.  Potassium 4    *Macrocytosis.  Gradually worsening.  MCV improved from 101-106, down to 97 with oral B12.  MCV 99.2    *Pulmonary nodule, initially seen in 10/28/13.    CT April 2014 revealed some calcification, suggesting this was granulomatous disease.  Plan was to repeat one final CT around April  2015 and if stable know further follow-up CTs.  Patient did not return for follow-up.  At this point, if this were cancer, I would expect it to be apparent by now.  She is 88 years old.  · Patient and daughter do not want any further follow-up of this.    11/24/2020, unremarkable: Creatinine, bilirubin  Await direct Tania and haptoglobin    Plan  · Monthly CBC, faxed here.  · In 3 months, faxed here:  · CBC, ferritin, serum iron, TIBC, iron percent saturation  · MD, telephone visit, 1 week after the 3-month labs (Must have those lab results)  · Patient's mobile number is [phone]    Prior plan was:  · Continue oral 500 µg B12, daily.  · M.D. CBC B12 level 1 year (patient and daughter would like asfew visits is possible to try and minimize doctor's visits)    You have chosen to receive care through a telephone visit today. Do you consent to use a telephone visit for your medical care today? Yes     This visit has been rescheduled as a phone visit to comply with patient safety concerns in accordance with CDC recommendations. Total time of discussion was 12 minutes.    35068 is 5-10 minutes  93753 is 11-20 minutes  24739 is 21 or more minutes

## 2021-02-12 DIAGNOSIS — D64.9 ANEMIA, UNSPECIFIED TYPE: Primary | ICD-10-CM

## 2021-03-05 ENCOUNTER — CLINICAL SUPPORT NO REQUIREMENTS (OUTPATIENT)
Dept: CARDIOLOGY | Facility: CLINIC | Age: 86
End: 2021-03-05

## 2021-03-05 DIAGNOSIS — I49.5 SICK SINUS SYNDROME (HCC): Primary | ICD-10-CM

## 2021-03-05 DIAGNOSIS — I48.19 PERSISTENT ATRIAL FIBRILLATION (HCC): ICD-10-CM

## 2021-03-05 PROCEDURE — 93280 PM DEVICE PROGR EVAL DUAL: CPT | Performed by: INTERNAL MEDICINE

## 2021-03-12 ENCOUNTER — BULK ORDERING (OUTPATIENT)
Dept: CASE MANAGEMENT | Facility: OTHER | Age: 86
End: 2021-03-12

## 2021-03-12 DIAGNOSIS — Z23 IMMUNIZATION DUE: ICD-10-CM

## 2021-03-16 ENCOUNTER — TELEPHONE (OUTPATIENT)
Dept: FAMILY MEDICINE CLINIC | Facility: CLINIC | Age: 86
End: 2021-03-16

## 2021-03-16 NOTE — TELEPHONE ENCOUNTER
Please put in outside lab. We will then to call and get the fax number from [facility] and fax them

## 2021-03-17 DIAGNOSIS — M1A.09X0 IDIOPATHIC CHRONIC GOUT OF MULTIPLE SITES WITHOUT TOPHUS: Primary | ICD-10-CM

## 2021-03-17 DIAGNOSIS — E11.3393 TYPE 2 DIABETES MELLITUS WITH BOTH EYES AFFECTED BY MODERATE NONPROLIFERATIVE RETINOPATHY WITHOUT MACULAR EDEMA, WITHOUT LONG-TERM CURRENT USE OF INSULIN (HCC): ICD-10-CM

## 2021-03-17 DIAGNOSIS — I10 ESSENTIAL HYPERTENSION: ICD-10-CM

## 2021-03-17 DIAGNOSIS — N18.30 STAGE 3 CHRONIC KIDNEY DISEASE, UNSPECIFIED WHETHER STAGE 3A OR 3B CKD (HCC): ICD-10-CM

## 2021-03-17 DIAGNOSIS — E78.49 OTHER HYPERLIPIDEMIA: ICD-10-CM

## 2021-03-17 DIAGNOSIS — N18.31 STAGE 3A CHRONIC KIDNEY DISEASE (HCC): ICD-10-CM

## 2021-03-22 DIAGNOSIS — E11.3393 TYPE 2 DIABETES MELLITUS WITH BOTH EYES AFFECTED BY MODERATE NONPROLIFERATIVE RETINOPATHY WITHOUT MACULAR EDEMA, WITHOUT LONG-TERM CURRENT USE OF INSULIN (HCC): ICD-10-CM

## 2021-03-24 ENCOUNTER — TELEPHONE (OUTPATIENT)
Dept: FAMILY MEDICINE CLINIC | Facility: CLINIC | Age: 86
End: 2021-03-24

## 2021-03-24 ENCOUNTER — OFFICE VISIT (OUTPATIENT)
Dept: FAMILY MEDICINE CLINIC | Facility: CLINIC | Age: 86
End: 2021-03-24

## 2021-03-24 VITALS
DIASTOLIC BLOOD PRESSURE: 62 MMHG | TEMPERATURE: 96.8 F | RESPIRATION RATE: 16 BRPM | SYSTOLIC BLOOD PRESSURE: 112 MMHG | OXYGEN SATURATION: 93 % | HEART RATE: 69 BPM | WEIGHT: 128 LBS | HEIGHT: 62 IN | BODY MASS INDEX: 23.55 KG/M2

## 2021-03-24 DIAGNOSIS — Z00.00 MEDICARE ANNUAL WELLNESS VISIT, SUBSEQUENT: Primary | ICD-10-CM

## 2021-03-24 DIAGNOSIS — N18.30 STAGE 3 CHRONIC KIDNEY DISEASE, UNSPECIFIED WHETHER STAGE 3A OR 3B CKD (HCC): ICD-10-CM

## 2021-03-24 DIAGNOSIS — M1A.09X0 IDIOPATHIC CHRONIC GOUT OF MULTIPLE SITES WITHOUT TOPHUS: ICD-10-CM

## 2021-03-24 DIAGNOSIS — E78.49 OTHER HYPERLIPIDEMIA: ICD-10-CM

## 2021-03-24 DIAGNOSIS — M81.0 AGE-RELATED OSTEOPOROSIS WITHOUT CURRENT PATHOLOGICAL FRACTURE: ICD-10-CM

## 2021-03-24 DIAGNOSIS — I10 ESSENTIAL HYPERTENSION: ICD-10-CM

## 2021-03-24 DIAGNOSIS — E11.3393 TYPE 2 DIABETES MELLITUS WITH BOTH EYES AFFECTED BY MODERATE NONPROLIFERATIVE RETINOPATHY WITHOUT MACULAR EDEMA, WITHOUT LONG-TERM CURRENT USE OF INSULIN (HCC): ICD-10-CM

## 2021-03-24 DIAGNOSIS — F32.5 MAJOR DEPRESSIVE DISORDER WITH SINGLE EPISODE, IN FULL REMISSION (HCC): ICD-10-CM

## 2021-03-24 PROCEDURE — 99214 OFFICE O/P EST MOD 30 MIN: CPT | Performed by: FAMILY MEDICINE

## 2021-03-24 PROCEDURE — G0439 PPPS, SUBSEQ VISIT: HCPCS | Performed by: FAMILY MEDICINE

## 2021-03-24 RX ORDER — ATORVASTATIN CALCIUM 20 MG/1
20 TABLET, FILM COATED ORAL DAILY
Qty: 90 TABLET | Refills: 1 | Status: SHIPPED | OUTPATIENT
Start: 2021-03-24 | End: 2021-09-27 | Stop reason: SDUPTHER

## 2021-03-24 RX ORDER — METOPROLOL TARTRATE 50 MG/1
50 TABLET, FILM COATED ORAL 2 TIMES DAILY
Qty: 180 TABLET | Refills: 1 | Status: SHIPPED | OUTPATIENT
Start: 2021-03-24 | End: 2021-09-27 | Stop reason: SDUPTHER

## 2021-03-24 RX ORDER — PIOGLITAZONEHYDROCHLORIDE 30 MG/1
30 TABLET ORAL DAILY
Qty: 90 TABLET | Refills: 1 | Status: SHIPPED | OUTPATIENT
Start: 2021-03-24 | End: 2021-09-27 | Stop reason: SDUPTHER

## 2021-03-24 RX ORDER — ALLOPURINOL 100 MG/1
100 TABLET ORAL DAILY
Qty: 90 TABLET | Refills: 1 | Status: SHIPPED | OUTPATIENT
Start: 2021-03-24 | End: 2021-09-27 | Stop reason: SDUPTHER

## 2021-03-24 RX ORDER — ESCITALOPRAM OXALATE 10 MG/1
10 TABLET ORAL DAILY
Qty: 90 TABLET | Refills: 1 | Status: SHIPPED | OUTPATIENT
Start: 2021-03-24 | End: 2021-09-27 | Stop reason: SDUPTHER

## 2021-03-24 NOTE — ASSESSMENT & PLAN NOTE
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 Their depression is currently in full remission and the condition is unchanged. This will be reassessed at the next regular appointment. F/U as described:patient will continue current medication therapy. I have taken over this prescription refill from her psychiatrist per family request.

## 2021-03-24 NOTE — TELEPHONE ENCOUNTER
Caller: MARIZOL    Relationship: residential    Best call back number: [phone]    Additional notes: PATIENT'S DAUGHTER BROUGHT IN A LAB ORDER. MARIZOL STATES THAT THEY HAVE DONE LABS BEFORE AND SENT IN THE RESULTS BUT THERE ARE NO DIRECTIONS WITH THE ORDER OR A DATE AS TO WHEN TO PREFORM THE LABS.     MARIZOL WOULD LIKE A CALL BACK FOR CLARIFICATION.

## 2021-03-24 NOTE — PROGRESS NOTES
Subsequent Medicare Wellness Visit  The ABC's of Medicare Wellness Visit  Chief Complaint   Patient presents with   • Medicare Wellness-subsequent       Subjective   History of Present Illness      Val Jeronimo is a 89 y.o. female who presents   for a Subsequent Medicare Wellness Visit.  She is also here to refill medications.  Labs are pending at the time of this dictation.  Overall she feels well.  She ran out of mirtazapine a few weeks ago and has done okay without that medication.  Depression is well controlled with just escitalopram.  Her previous psychiatrist has stopped coming to her facility and they have asked for us to take over that prescription.  Type 2 diabetes stable pending lab results.  Blood pressure is controlled.  No recent gout attacks.  Chronic kidney disease stable pending lab results.  Health maintenance topics have been reviewed during today's wellness visit.  She is due for DEXA scan.  Immunizations are up to date.    Does the patient have evidence of cognitive impairment?   No    Asprin use counseling:Taking ASA appropriately as indicated    Recent Hospitalizations:  No hospitalization(s) within the last year.    Advanced Care Planning:  ACP discussion was held with the patient during this visit. Patient has an advance directive in EMR which is still valid.     The following portions of the patient's history were reviewed   and updated as appropriate: allergies, current medications, past family history, past medical history, past social history, past surgical history and problem list.    Compared to one year ago, the patient feels her   physical health is better.  Compared to one year ago, the patient feels her   mental health is better.    Reviewed chart for potential of high risk medication in the elderly:  yes  Reviewed chart for potential of harmful drug interactions in the elderly:  yes    Body mass index is 23.41 kg/m².  Discussed the patient's BMI with her.  The BMI is in the  acceptable range.    HEALTH RISK ASSESSMENT BEGIN  Fall Risk Screen:  AMILCAR Fall Risk Assessment has not been completed.    Depression Screen:   PHQ-2/PHQ-9 Depression Screening 3/24/2021   Little interest or pleasure in doing things 0   Feeling down, depressed, or hopeless 0   Trouble falling or staying asleep, or sleeping too much -   Feeling tired or having little energy -   Poor appetite or overeating -   Feeling bad about yourself - or that you are a failure or have let yourself or your family down -   Trouble concentrating on things, such as reading the newspaper or watching television -   Moving or speaking so slowly that other people could have noticed. Or the opposite - being so fidgety or restless that you have been moving around a lot more than usual -   Thoughts that you would be better off dead, or of hurting yourself in some way -   Total Score 0   If you checked off any problems, how difficult have these problems made it for you to do your work, take care of things at home, or get along with other people? -       Current Medical Providers:  Patient Care Team:  Harlan Arthur MD as PCP - Jair Redd OD as Consulting Physician (Ophthalmology)  Ananth Yoo MD as Consulting Physician (Cardiology)  Quinten Kruse DPM (Podiatry)  Harlan Arthur MD as Referring Physician (Family Medicine)  Harlan Traore II, MD as Consulting Physician (Hematology and Oncology)    Smoking Status:  Social History     Tobacco Use   Smoking Status Never Smoker   Smokeless Tobacco Never Used   Tobacco Comment            Alcohol Consumption:  Social History     Substance and Sexual Activity   Alcohol Use Yes   • Alcohol/week: 1.0 standard drinks   • Types: 1 Glasses of wine per week    Comment: occ/caffeine use       Health Habits and Functional and Cognitive Screening:  Functional & Cognitive Status 3/24/2021   Do you have difficulty preparing food and eating? No   Do you have difficulty bathing  yourself, getting dressed or grooming yourself? No   Do you have difficulty using the toilet? No   Do you have difficulty moving around from place to place? No   Do you have trouble with steps or getting out of a bed or a chair? No   Current Diet Well Balanced Diet   Dental Exam Up to date   Eye Exam Up to date   Exercise (times per week) 4 times per week   Current Exercise Activities Include Walking   Do you need help using the phone?  No   Are you deaf or do you have serious difficulty hearing?  Yes   Do you need help with transportation? Yes   Do you need help shopping? No   Do you need help preparing meals?  No   Do you need help with housework?  No   Do you need help with laundry? No   Do you need help taking your medications? No   Do you need help managing money? No   Do you ever drive or ride in a car without wearing a seat belt? No   Have you felt unusual stress, anger or loneliness in the last month? No   Who do you live with? Community   If you need help, do you have trouble finding someone available to you? Yes   Have you been bothered in the last four weeks by sexual problems? No   Do you have difficulty concentrating, remembering or making decisions? No       Age-appropriate Screening Schedule:  Refer to the list below for future screening recommendations based on patient's age, sex and/or medical conditions. Orders for these recommended tests are listed in the plan section. The patient has been provided with a written plan.    Health Maintenance   Topic Date Due   • DXA SCAN  12/12/2020   • HEMOGLOBIN A1C  03/23/2021   • LIPID PANEL  09/23/2021   • URINE MICROALBUMIN  09/23/2021   • DIABETIC EYE EXAM  12/08/2021   • TDAP/TD VACCINES (3 - Td) 10/27/2026   • INFLUENZA VACCINE  Completed   • ZOSTER VACCINE  Completed   • MAMMOGRAM  Discontinued     HEALTH RISK ASSESSMENT END    Outpatient Medications Prior to Visit   Medication Sig Dispense Refill   • aspirin 81 MG tablet Take by mouth daily.     •  digoxin (LANOXIN) 125 MCG tablet TAKE ONE TABLET BY MOUTH DAILY 90 tablet 3   • ferrous sulfate 325 (65 FE) MG tablet Take 1 tablet by mouth 2 (two) times a day. 60 tablet 3   • furosemide (LASIX) 40 MG tablet TAKE ONE AND ONE-HALF (1 & 1/2) TABLET BY MOUTH DAILY 135 tablet 3   • Probiotic capsule Take  by mouth Daily.     • vitamin B-12 (CYANOCOBALAMIN) 1000 MCG tablet Take 1,000 mcg by mouth Daily.     • allopurinol (ZYLOPRIM) 100 MG tablet Take 1 tablet by mouth Daily for 180 days. 90 tablet 1   • atorvastatin (LIPITOR) 20 MG tablet Take 1 tablet by mouth Daily for 180 days. 90 tablet 1   • escitalopram (LEXAPRO) 10 MG tablet      • metFORMIN (GLUCOPHAGE) 500 MG tablet TAKE ONE TABLET BY MOUTH TWICE A DAY WITH A MEAL 60 tablet 0   • metoprolol tartrate (LOPRESSOR) 50 MG tablet Take 1 tablet by mouth 2 (Two) Times a Day for 180 days. 180 tablet 1   • mirtazapine (REMERON) 7.5 MG tablet      • pioglitazone (ACTOS) 30 MG tablet Take 1 tablet by mouth Daily for 180 days. 90 tablet 1   • acetaminophen (TYLENOL) 325 MG tablet Take 2 tablets by mouth Every 4 (Four) Hours As Needed for Mild Pain .       No facility-administered medications prior to visit.       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   Constitutional:       General: She is not in acute distress.  Eyes:      General: Lids are normal.      Conjunctiva/sclera: Conjunctivae normal.   Neck:      Vascular: No carotid bruit.      Trachea: No tracheal deviation.   Cardiovascular:      Rate and Rhythm: Normal rate and regular rhythm.      Heart sounds: Normal heart sounds. No murmur heard.     Pulmonary:      Effort: Pulmonary effort is normal.      Breath sounds: Examination of the left-middle field reveals rales. Examination of the left-lower field reveals rales. Rales present.   Skin:     General: Skin is warm and dry.   Neurological:      Mental Status: She is alert. She is not disoriented.   Psychiatric:         Speech: Speech normal.         Behavior: Behavior normal. Behavior is cooperative.         Result Review :               Data reviewed: outside labs  SCANNED - LABS (03/04/2021)    Assessment/Plan   Assessment and Plan      Diagnoses and all orders for this visit:    1. Medicare annual wellness visit, subsequent (Primary)  Comments:  continue activity, healthy diet, due for osteoporosis    2. Type 2 diabetes mellitus with both eyes affected by moderate nonproliferative retinopathy without macular edema, without long-term current use of insulin (CMS/[facility])  Assessment & Plan:  Diabetes is unchanged.   Continue current treatment regimen.  Diabetes will be reassessed in 6 months.    Orders:  -     metFORMIN (GLUCOPHAGE) 500 MG tablet; Take 1 tablet by mouth 2 (Two) Times a Day With Meals for 180 days.  Dispense: 180 tablet; Refill: 1  -     pioglitazone (ACTOS) 30 MG tablet; Take 1 tablet by mouth Daily for 180 days.  Dispense: 90 tablet; Refill: 1    3. Idiopathic chronic gout of multiple sites without tophus  Assessment & Plan:  The current medical regimen is effective;  " continue present plan and medications.      Orders:  -     allopurinol (ZYLOPRIM) 100 MG tablet; Take 1 tablet by mouth Daily for 180 days.  Dispense: 90 tablet; Refill: 1    4. Other hyperlipidemia  -     atorvastatin (LIPITOR) 20 MG tablet; Take 1 tablet by mouth Daily for 180 days.  Dispense: 90 tablet; Refill: 1    5. Essential hypertension  Assessment & Plan:  Hypertension is unchanged.  Continue current treatment regimen.  Blood pressure will be reassessed at the next regular appointment.    Orders:  -     metoprolol tartrate (LOPRESSOR) 50 MG tablet; Take 1 tablet by mouth 2 (Two) Times a Day for 180 days.  Dispense: 180 tablet; Refill: 1    6. Major depressive disorder with single episode, in full remission (CMS/[facility])  Assessment & Plan:  Patient's depression is single episode and is mild without psychosis. Their depression is currently in full remission and the condition is unchanged. This will be reassessed at the next regular appointment. F/U as described:patient will continue current medication therapy. I have taken over this prescription refill from her psychiatrist per family request.     Orders:  -     escitalopram (LEXAPRO) 10 MG tablet; Take 1 tablet by mouth Daily for 180 days.  Dispense: 90 tablet; Refill: 1    7. Stage 3 chronic kidney disease, unspecified whether stage 3a or 3b CKD (CMS/[facility])  Assessment & Plan:  Renal condition is unchanged.  Continue current treatment regimen.  Renal condition will be reassessed in 6 months.      8. Age-related osteoporosis without current pathological fracture  Assessment & Plan:  dexa ordered.     Orders:  -     DEXA Bone Density Axial; Future      Medicare Risks and Personalized Health Plan  CMS Preventative Services Quick Reference  Advance Directive Discussion  Cardiovascular risk  Osteoprorosis Risk    The above risks/problems have been discussed with the patient.  Pertinent information has been shared with the patient in the   After Visit Summary. Follow  up plans and orders are seen below   in the Assessment/Plan Section.    Follow Up   Return in about 6 months (around 9/24/2021) for recheck/refill medication.     An After Visit Summary and PPPS were given to the patient.

## 2021-03-25 ENCOUNTER — TELEPHONE (OUTPATIENT)
Dept: FAMILY MEDICINE CLINIC | Facility: CLINIC | Age: 86
End: 2021-03-25

## 2021-03-25 NOTE — TELEPHONE ENCOUNTER
MARIZOL WITH [facility] CALLED STATING THAT THEY RECEIVED BLOOD DRAW ORDERS FROM DR. OLIVAREZ FOR THE PATIENT, HOWEVER, THERE WAS NO DATE ENTERED AS TO WHEN HE WANTED IT DONE.    REQUESTING CLARIFICATION.  [phone]

## 2021-04-26 ENCOUNTER — TELEPHONE (OUTPATIENT)
Dept: FAMILY MEDICINE CLINIC | Facility: CLINIC | Age: 86
End: 2021-04-26

## 2021-04-26 NOTE — TELEPHONE ENCOUNTER
Caller: Haritha Corbett    Relationship: Emergency Contact    Best call back number: [phone]    What orders are you requesting (i.e. lab or imaging): ORDERS TO DISCONTINUE THE   MIRTAZATINE 7.5 MG FOR THE [facility]   In what timeframe would the patient need to come in: ASAP    Additional notes: FAX NUMBER [phone]

## 2021-04-26 NOTE — TELEPHONE ENCOUNTER
Mirtazapine should be discontinued. Patient does not have to come into office to see me. Dr. Arthur

## 2021-04-27 ENCOUNTER — TELEPHONE (OUTPATIENT)
Dept: FAMILY MEDICINE CLINIC | Facility: CLINIC | Age: 86
End: 2021-04-27

## 2021-05-03 ENCOUNTER — TELEPHONE (OUTPATIENT)
Dept: ONCOLOGY | Facility: CLINIC | Age: 86
End: 2021-05-03

## 2021-05-03 NOTE — TELEPHONE ENCOUNTER
Caller: CAROL BRENNAN    Relationship to patient: DAUGHTER    Best call back number:[phone]    Type of visit: TELEPHONE VISIT    DAUGHTER WISHED TO CHANGE THE TIME OF THE TELEPHONE VISIT WITH DR ZAVALA TO EARLIER IN THE DAY. IF THAT IS NOT POSSIBLE, WILL NEED TO RESCHEDULE.

## 2021-05-07 ENCOUNTER — OFFICE VISIT (OUTPATIENT)
Dept: ONCOLOGY | Facility: CLINIC | Age: 86
End: 2021-05-07

## 2021-05-07 DIAGNOSIS — D64.9 ANEMIA, UNSPECIFIED TYPE: Primary | ICD-10-CM

## 2021-05-07 PROCEDURE — 99442 PR PHYS/QHP TELEPHONE EVALUATION 11-20 MIN: CPT | Performed by: INTERNAL MEDICINE

## 2021-05-07 NOTE — PROGRESS NOTES
.     REASON FOR FOLLOWUP :   Macrocytosis, anemia    HISTORY OF PRESENT ILLNESS:  The patient is a 89 y.o. year old female  who is here for follow-up with the above-mentioned history.    Feeling fine.  Denies bleeding.  No CP or SOA    Past Medical History:   Diagnosis Date   • Arthritis    • Atrial fibrillation (CMS/HCC)    • CHF (congestive heart failure) (CMS/HCC)    • Degeneration of thoracic intervertebral disc    • Diabetes mellitus (CMS/HCC)    • Gout    • H/O Pulmonary nodule 10/2013   • History of anemia 10/2013   • History of cellulitis 2016    Finger of left hand   • Hyperlipidemia    • Hypertension    • Odontoid fracture (CMS/HCC)    • Osteoporosis    • Pacemaker      Past Surgical History:   Procedure Laterality Date   • NECK SURGERY  10/2013    Broken neck repair   • PACEMAKER IMPLANTATION     • SKIN CANCER EXCISION      12/20/19       HEMATOLOGIC/ONCOLOGIC HISTORY:  (History from previous dates can be found in the separate document.)    MEDICATIONS    Current Outpatient Medications:   •  acetaminophen (TYLENOL) 325 MG tablet, Take 2 tablets by mouth Every 4 (Four) Hours As Needed for Mild Pain ., Disp: , Rfl:   •  allopurinol (ZYLOPRIM) 100 MG tablet, Take 1 tablet by mouth Daily for 180 days., Disp: 90 tablet, Rfl: 1  •  aspirin 81 MG tablet, Take by mouth daily., Disp: , Rfl:   •  atorvastatin (LIPITOR) 20 MG tablet, Take 1 tablet by mouth Daily for 180 days., Disp: 90 tablet, Rfl: 1  •  digoxin (LANOXIN) 125 MCG tablet, TAKE ONE TABLET BY MOUTH DAILY, Disp: 90 tablet, Rfl: 3  •  escitalopram (LEXAPRO) 10 MG tablet, Take 1 tablet by mouth Daily for 180 days., Disp: 90 tablet, Rfl: 1  •  ferrous sulfate 325 (65 FE) MG tablet, Take 1 tablet by mouth 2 (two) times a day., Disp: 60 tablet, Rfl: 3  •  furosemide (LASIX) 40 MG tablet, TAKE ONE AND ONE-HALF (1 & 1/2) TABLET BY MOUTH DAILY, Disp: 135 tablet, Rfl: 3  •  metFORMIN (GLUCOPHAGE) 500 MG tablet, Take 1 tablet by mouth 2 (Two) Times a Day  With Meals for 180 days., Disp: 180 tablet, Rfl: 1  •  metoprolol tartrate (LOPRESSOR) 50 MG tablet, Take 1 tablet by mouth 2 (Two) Times a Day for 180 days., Disp: 180 tablet, Rfl: 1  •  pioglitazone (ACTOS) 30 MG tablet, Take 1 tablet by mouth Daily for 180 days., Disp: 90 tablet, Rfl: 1  •  Probiotic capsule, Take  by mouth Daily., Disp: , Rfl:   •  vitamin B-12 (CYANOCOBALAMIN) 1000 MCG tablet, Take 1,000 mcg by mouth Daily., Disp: , Rfl:     ALLERGIES:     Allergies   Allergen Reactions   • Advil [Ibuprofen] Unknown - Low Severity     .       SOCIAL HISTORY:       Social History     Socioeconomic History   • Marital status:      Spouse name: Noah   • Number of children: 5   • Years of education: High School   • Highest education level: Not on file   Tobacco Use   • Smoking status: Never Smoker   • Smokeless tobacco: Never Used   • Tobacco comment:     Substance and Sexual Activity   • Alcohol use: Yes     Alcohol/week: 1.0 standard drinks     Types: 1 Glasses of wine per week     Comment: occ/caffeine use   • Drug use: No   • Sexual activity: Defer         FAMILY HISTORY:  Family History   Problem Relation Age of Onset   • Heart disease Father    • Kidney cancer Sister 61        Renal cell carcinoma   • Leukemia Son 44        CML   • Colon cancer Daughter 64   • COPD Sister         non smoker       REVIEW OF SYSTEMS:  Review of Systems   Constitutional: Negative for activity change.   HENT: Negative for nosebleeds and trouble swallowing.    Respiratory: Negative for shortness of breath and wheezing.    Cardiovascular: Negative for chest pain and palpitations.   Gastrointestinal: Negative for constipation, diarrhea and nausea.   Genitourinary: Negative for dysuria and hematuria.   Musculoskeletal: Negative for arthralgias and myalgias.   Skin: Negative for rash and wound.   Neurological: Negative for seizures and syncope.   Hematological: Negative for adenopathy. Does not bruise/bleed easily.    Psychiatric/Behavioral: Negative for confusion.              There were no vitals filed for this visit.  Current Status 11/6/2019   ECOG score 1      PHYSICAL EXAM:    No exam as this was a telephone visit    RECENT LABS:        WBC   Date Value Ref Range Status   09/23/2020 5.24 3.40 - 10.80 10*3/mm3 Final   12/06/2019 7.50 3.40 - 10.80 10*3/mm3 Final   12/05/2019 5.99 3.40 - 10.80 10*3/mm3 Final   12/04/2019 5.62 3.40 - 10.80 10*3/mm3 Final   12/03/2019 7.22 3.40 - 10.80 10*3/mm3 Final   11/06/2019 5.74 3.40 - 10.80 10*3/mm3 Final   05/22/2019 6.60 3.40 - 10.80 10*3/mm3 Final   05/08/2019 6.18 3.40 - 10.80 10*3/mm3 Final   11/12/2018 6.97 4.00 - 10.00 10*3/mm3 Final   08/09/2018 5.24 4.50 - 10.70 10*3/mm3 Final   06/20/2018 7.78 4.00 - 10.00 10*3/mm3 Final   06/06/2018 6.76 4.00 - 10.00 10*3/mm3 Final   05/02/2018 5.59 4.50 - 10.70 10*3/mm3 Final   11/20/2017 6.56 4.50 - 10.70 10*3/mm3 Final   09/21/2017 6.73 4.50 - 10.70 10*3/mm3 Final   03/06/2017 7.08 4.50 - 10.70 10*3/mm3 Final   09/12/2016 5.86 4.50 - 10.70 10*3/mm3 Final   03/15/2016 6.8 3.4 - 10.8 x10E3/uL Final   04/28/2014 9.70 4.50 - 10.70 K/Cumm Final   01/29/2014 4.04 (L) 4.50 - 10.70 K/Cumm Final   01/28/2014 4.27 (L) 4.50 - 10.70 K/Cumm Final   01/26/2014 6.84 4.50 - 10.70 K/Cumm Final     Hemoglobin   Date Value Ref Range Status   09/23/2020 11.1 (L) 12.0 - 15.9 g/dL Final   12/06/2019 8.8 (L) 12.0 - 15.9 g/dL Final   12/05/2019 9.1 (L) 12.0 - 15.9 g/dL Final   12/04/2019 8.8 (L) 12.0 - 15.9 g/dL Final   12/03/2019 10.2 (L) 12.0 - 15.9 g/dL Final   11/06/2019 11.6 (L) 12.0 - 15.9 g/dL Final   05/22/2019 11.5 (L) 12.0 - 15.9 g/dL Final   05/08/2019 11.4 (L) 12.0 - 15.9 g/dL Final   11/12/2018 12.8 11.5 - 14.9 g/dL Final   08/09/2018 10.9 (L) 11.9 - 15.5 g/dL Final   06/20/2018 11.2 (L) 11.5 - 14.9 g/dL Final   06/06/2018 10.9 (L) 11.5 - 14.9 g/dL Final   05/02/2018 10.8 (L) 11.9 - 15.5 g/dL Final   11/20/2017 12.3 11.9 - 15.5 g/dL Final    09/21/2017 12.4 11.9 - 15.5 g/dL Final   03/06/2017 12.7 11.9 - 15.5 g/dL Final   09/12/2016 12.5 11.9 - 15.5 g/dL Final   03/15/2016 11.7 11.1 - 15.9 g/dL Final   04/28/2014 12.1 11.9 - 15.5 g/dL Final   01/29/2014 12.6 11.9 - 15.5 g/dL Final   01/28/2014 12.6 11.9 - 15.5 g/dL Final   01/26/2014 12.3 11.9 - 15.5 g/dL Final     Platelets   Date Value Ref Range Status   09/23/2020 179 140 - 450 10*3/mm3 Final   12/06/2019 173 140 - 450 10*3/mm3 Final   12/05/2019 203 140 - 450 10*3/mm3 Final   12/04/2019 190 140 - 450 10*3/mm3 Final   12/03/2019 196 140 - 450 10*3/mm3 Final   11/06/2019 197 140 - 450 10*3/mm3 Final   05/22/2019 184 140 - 450 10*3/mm3 Final   05/08/2019 174 140 - 450 10*3/mm3 Final   11/12/2018 221 150 - 375 10*3/mm3 Final   08/09/2018 187 140 - 500 10*3/mm3 Final   06/20/2018 216 150 - 375 10*3/mm3 Final   06/06/2018 189 150 - 375 10*3/mm3 Final   05/02/2018 250 140 - 500 10*3/mm3 Final   11/20/2017 226 140 - 500 10*3/mm3 Final   09/21/2017 227 140 - 500 10*3/mm3 Final   03/06/2017 268 140 - 500 10*3/mm3 Final   09/12/2016 221 140 - 500 10*3/mm3 Final   03/15/2016 220 150 - 379 x10E3/uL Final   04/28/2014 216 140 - 500 K/Cumm Final   01/29/2014 150 140 - 500 K/Cumm Final   01/28/2014 151 140 - 500 K/Cumm Final   01/26/2014 166 140 - 500 K/Cumm Final       Assessment/Plan   There are no diagnoses linked to this encounter.    *Anemia.  Hb 10.8 on 5/2/18.  Hb normal on 11/20/17 and on prior labs.  Unremarkable: SPEP, S CATRACHITO, haptoglobin, LDH, total bilirubin, cait.  Ferritin not robust at 40, but 20% iron sat and retic Hb high, not low.  Therefore, doubt this is iron deficiency.  · Hb previously 11-12 through 11/6/2019.  · Hb was around 9 during early December 2019 for day hospitalization for fall with cervical spine fracture.  · Hb 9.9 on [facility] labs, 8/9/2020.  · Hb 11.1 on [facility] labs, 9/23/2020.  · Hb 9 on NH labs, 11/3/2020.  · Because of this lower Hb value, check some additional anemia labs and  we will follow her more closely.  · Hb 10.9, ferritin 32, 23% saturation serum folate 10.8, B12 >1500, , haptoglobin 61, bilirubin 1.1, creatinine 1.1 on 2/2/2021  · Hb 10.4 on 5/6/2021, from 11 on 4/1/2021, from 10.4 on 3/4/2021, from 10.9 on 2/2/2021.    *Iron deficiency  · 11/24/2020: Ferritin 14, Hb 9.4  · Therefore, iron deficiency anemia.    · Ferrous sulfate twice daily started 12/8/2020.  · Subsequent Hb, 10.9, on 2/2/2021 (improved)  · 5/7/2021, ferritin 44    *Source of iron deficiency  Patient declines a GI evaluation.    *Leukocytopenia  · WBC previously normal through 12/6/2019  · On 8/9/2020, WBC 3.3  · On 9/23/2020, WBC 5.2  · On 11/3/2020, WBC 3.8  · On 2/2/2021, WBC 4.7  · On 3/4/2021, WBC 3.9  · On 4/1/2021, WBC 3.8.  · On 5/6/2021, WBC 4.3.    *Thrombocytopenia  On 2/2/2021,   On 3/4/2021,   On 4/1/2021,   On 5/6/2021,     *b12 Borderline low, but normal at 269 with normal range 211-246 on 6/6/18.  · Oral B12 500 µg daily, started 6/20/18.  B12,  >1500    *Creatinine was 1.24 on 6/6/18.  On 5/2/18 and on prior labs, creatinine mostly 1.  Creatinine repeated 6/20/18:  1.26.  Creatinine 1.1    *Prior hyperkalemia.  Mild.  At that time, I asked the nurses to call her and make sure she is no longer taking her potassium supplement.  If she is, stop it.  Defer to her PCP if her Diovan stopped and any further workup or treatment of the renal insufficiency.  Potassium 4    *Macrocytosis.  Gradually worsening.  MCV improved from 101-106, down to 97 with oral B12.  MCV 99.2    *Pulmonary nodule, initially seen in 10/28/13.    CT April 2014 revealed some calcification, suggesting this was granulomatous disease.  Plan was to repeat one final CT around April 2015 and if stable know further follow-up CTs.  Patient did not return for follow-up.  At this point, if this were cancer, I would expect it to be apparent by now.  She is 88 years old.  · Patient and daughter do not want  any further follow-up of this.    11/24/2020, unremarkable: Creatinine, bilirubin  Await direct Tania and haptoglobin    Plan  · Monthly CBC, faxed here.  · In 3 months, faxed here:  · CBC, ferritin, serum iron, TIBC, iron percent saturation  · MD, telephone visit, 1 week after the 3-month labs (Must have those lab results)  · Patient's mobile number is [phone]    Prior plan was:  · Continue oral 500 µg B12, daily.  · M.D. CBC B12 level 1 year (patient and daughter would like asfew visits is possible to try and minimize doctor's visits)    You have chosen to receive care through a telephone visit today. Do you consent to use a telephone visit for your medical care today? Yes     This visit has been rescheduled as a phone visit to comply with patient safety concerns in accordance with CDC recommendations. Total time of discussion was 11 minutes.    30995 is 5-10 minutes  89718 is 11-20 minutes  77110 is 21 or more minutes

## 2021-05-10 DIAGNOSIS — D64.9 ANEMIA, UNSPECIFIED TYPE: Primary | ICD-10-CM

## 2021-05-18 ENCOUNTER — DOCUMENTATION (OUTPATIENT)
Dept: ONCOLOGY | Facility: CLINIC | Age: 86
End: 2021-05-18

## 2021-06-01 RX ORDER — FERROUS SULFATE 325(65) MG
325 TABLET ORAL 2 TIMES DAILY
Qty: 60 TABLET | Refills: 3 | Status: CANCELLED | OUTPATIENT
Start: 2021-06-01

## 2021-06-03 DIAGNOSIS — N18.30 STAGE 3 CHRONIC KIDNEY DISEASE, UNSPECIFIED WHETHER STAGE 3A OR 3B CKD (HCC): Primary | ICD-10-CM

## 2021-06-03 NOTE — TELEPHONE ENCOUNTER
Caller: Haritha Corbett    Relationship: Emergency Contact    Best call back number: [phone]    Medication needed:   Requested Prescriptions     Pending Prescriptions Disp Refills   • ferrous sulfate 325 (65 FE) MG tablet 60 tablet 3     Sig: Take 1 tablet by mouth 2 (two) times a day.       When do you need the refill by: ASAP    What additional details did the patient provide when requesting the medication:     Does the patient have less than a 3 day supply:  [x] Yes  [] No    What is the patient's preferred pharmacy: [facility] [address] [facility] AT [facility] [phone] [facility] [phone] FX

## 2021-06-04 RX ORDER — FERROUS SULFATE 325(65) MG
325 TABLET ORAL 2 TIMES DAILY
Qty: 60 TABLET | Refills: 3 | Status: SHIPPED | OUTPATIENT
Start: 2021-06-04

## 2021-07-20 ENCOUNTER — APPOINTMENT (OUTPATIENT)
Dept: BONE DENSITY | Facility: HOSPITAL | Age: 86
End: 2021-07-20

## 2021-08-13 ENCOUNTER — OFFICE VISIT (OUTPATIENT)
Dept: ONCOLOGY | Facility: CLINIC | Age: 86
End: 2021-08-13

## 2021-08-13 DIAGNOSIS — N18.30 STAGE 3 CHRONIC KIDNEY DISEASE, UNSPECIFIED WHETHER STAGE 3A OR 3B CKD (HCC): Primary | ICD-10-CM

## 2021-08-13 PROCEDURE — 99442 PR PHYS/QHP TELEPHONE EVALUATION 11-20 MIN: CPT | Performed by: INTERNAL MEDICINE

## 2021-08-13 NOTE — PROGRESS NOTES
.     REASON FOR FOLLOWUP :   Macrocytosis, anemia    HISTORY OF PRESENT ILLNESS:  The patient is a 89 y.o. year old female  who is here for follow-up with the above-mentioned history.    No new issues. Feeling fine. Denies bleeding, chest pain, shortness of breath.    Past Medical History:   Diagnosis Date   • Arthritis    • Atrial fibrillation (CMS/HCC)    • CHF (congestive heart failure) (CMS/HCC)    • Degeneration of thoracic intervertebral disc    • Diabetes mellitus (CMS/HCC)    • Gout    • H/O Pulmonary nodule 10/2013   • History of anemia 10/2013   • History of cellulitis 2016    Finger of left hand   • Hyperlipidemia    • Hypertension    • Odontoid fracture (CMS/HCC)    • Osteoporosis    • Pacemaker      Past Surgical History:   Procedure Laterality Date   • NECK SURGERY  10/2013    Broken neck repair   • PACEMAKER IMPLANTATION     • SKIN CANCER EXCISION      12/20/19       HEMATOLOGIC/ONCOLOGIC HISTORY:  (History from previous dates can be found in the separate document.)    MEDICATIONS    Current Outpatient Medications:   •  acetaminophen (TYLENOL) 325 MG tablet, Take 2 tablets by mouth Every 4 (Four) Hours As Needed for Mild Pain ., Disp: , Rfl:   •  allopurinol (ZYLOPRIM) 100 MG tablet, Take 1 tablet by mouth Daily for 180 days., Disp: 90 tablet, Rfl: 1  •  aspirin 81 MG tablet, Take by mouth daily., Disp: , Rfl:   •  atorvastatin (LIPITOR) 20 MG tablet, Take 1 tablet by mouth Daily for 180 days., Disp: 90 tablet, Rfl: 1  •  digoxin (LANOXIN) 125 MCG tablet, TAKE ONE TABLET BY MOUTH DAILY, Disp: 90 tablet, Rfl: 3  •  escitalopram (LEXAPRO) 10 MG tablet, Take 1 tablet by mouth Daily for 180 days., Disp: 90 tablet, Rfl: 1  •  ferrous sulfate 325 (65 FE) MG tablet, Take 1 tablet by mouth 2 (two) times a day., Disp: 60 tablet, Rfl: 3  •  furosemide (LASIX) 40 MG tablet, TAKE ONE AND ONE-HALF (1 & 1/2) TABLET BY MOUTH DAILY, Disp: 135 tablet, Rfl: 3  •  metFORMIN (GLUCOPHAGE) 500 MG tablet, Take 1 tablet  by mouth 2 (Two) Times a Day With Meals for 180 days., Disp: 180 tablet, Rfl: 1  •  metoprolol tartrate (LOPRESSOR) 50 MG tablet, Take 1 tablet by mouth 2 (Two) Times a Day for 180 days., Disp: 180 tablet, Rfl: 1  •  pioglitazone (ACTOS) 30 MG tablet, Take 1 tablet by mouth Daily for 180 days., Disp: 90 tablet, Rfl: 1  •  Probiotic capsule, Take  by mouth Daily., Disp: , Rfl:   •  vitamin B-12 (CYANOCOBALAMIN) 1000 MCG tablet, Take 1,000 mcg by mouth Daily., Disp: , Rfl:     ALLERGIES:     Allergies   Allergen Reactions   • Advil [Ibuprofen] Unknown - Low Severity     .       SOCIAL HISTORY:       Social History     Socioeconomic History   • Marital status:      Spouse name: Noah   • Number of children: 5   • Years of education: High School   • Highest education level: Not on file   Tobacco Use   • Smoking status: Never Smoker   • Smokeless tobacco: Never Used   • Tobacco comment:     Substance and Sexual Activity   • Alcohol use: Yes     Alcohol/week: 1.0 standard drinks     Types: 1 Glasses of wine per week     Comment: occ/caffeine use   • Drug use: No   • Sexual activity: Defer         FAMILY HISTORY:  Family History   Problem Relation Age of Onset   • Heart disease Father    • Kidney cancer Sister 61        Renal cell carcinoma   • Leukemia Son 44        CML   • Colon cancer Daughter 64   • COPD Sister         non smoker       REVIEW OF SYSTEMS:  Review of Systems   Constitutional: Negative for activity change.   HENT: Negative for nosebleeds and trouble swallowing.    Respiratory: Negative for shortness of breath and wheezing.    Cardiovascular: Negative for chest pain and palpitations.   Gastrointestinal: Negative for constipation, diarrhea and nausea.   Genitourinary: Negative for dysuria and hematuria.   Musculoskeletal: Negative for arthralgias and myalgias.   Skin: Negative for rash and wound.   Neurological: Negative for seizures and syncope.   Hematological: Negative for adenopathy. Does not  bruise/bleed easily.   Psychiatric/Behavioral: Negative for confusion.              There were no vitals filed for this visit.  Current Status 11/6/2019   ECOG score 1      PHYSICAL EXAM:    No exam as this was a telephone visit    RECENT LABS:        WBC   Date Value Ref Range Status   09/23/2020 5.24 3.40 - 10.80 10*3/mm3 Final   12/06/2019 7.50 3.40 - 10.80 10*3/mm3 Final   12/05/2019 5.99 3.40 - 10.80 10*3/mm3 Final   12/04/2019 5.62 3.40 - 10.80 10*3/mm3 Final   12/03/2019 7.22 3.40 - 10.80 10*3/mm3 Final   11/06/2019 5.74 3.40 - 10.80 10*3/mm3 Final   05/22/2019 6.60 3.40 - 10.80 10*3/mm3 Final   05/08/2019 6.18 3.40 - 10.80 10*3/mm3 Final   11/12/2018 6.97 4.00 - 10.00 10*3/mm3 Final   08/09/2018 5.24 4.50 - 10.70 10*3/mm3 Final   06/20/2018 7.78 4.00 - 10.00 10*3/mm3 Final   06/06/2018 6.76 4.00 - 10.00 10*3/mm3 Final   05/02/2018 5.59 4.50 - 10.70 10*3/mm3 Final   11/20/2017 6.56 4.50 - 10.70 10*3/mm3 Final   09/21/2017 6.73 4.50 - 10.70 10*3/mm3 Final   03/06/2017 7.08 4.50 - 10.70 10*3/mm3 Final   09/12/2016 5.86 4.50 - 10.70 10*3/mm3 Final   03/15/2016 6.8 3.4 - 10.8 x10E3/uL Final   04/28/2014 9.70 4.50 - 10.70 K/Cumm Final   01/29/2014 4.04 (L) 4.50 - 10.70 K/Cumm Final   01/28/2014 4.27 (L) 4.50 - 10.70 K/Cumm Final   01/26/2014 6.84 4.50 - 10.70 K/Cumm Final     Hemoglobin   Date Value Ref Range Status   09/23/2020 11.1 (L) 12.0 - 15.9 g/dL Final   12/06/2019 8.8 (L) 12.0 - 15.9 g/dL Final   12/05/2019 9.1 (L) 12.0 - 15.9 g/dL Final   12/04/2019 8.8 (L) 12.0 - 15.9 g/dL Final   12/03/2019 10.2 (L) 12.0 - 15.9 g/dL Final   11/06/2019 11.6 (L) 12.0 - 15.9 g/dL Final   05/22/2019 11.5 (L) 12.0 - 15.9 g/dL Final   05/08/2019 11.4 (L) 12.0 - 15.9 g/dL Final   11/12/2018 12.8 11.5 - 14.9 g/dL Final   08/09/2018 10.9 (L) 11.9 - 15.5 g/dL Final   06/20/2018 11.2 (L) 11.5 - 14.9 g/dL Final   06/06/2018 10.9 (L) 11.5 - 14.9 g/dL Final   05/02/2018 10.8 (L) 11.9 - 15.5 g/dL Final   11/20/2017 12.3 11.9 - 15.5  g/dL Final   09/21/2017 12.4 11.9 - 15.5 g/dL Final   03/06/2017 12.7 11.9 - 15.5 g/dL Final   09/12/2016 12.5 11.9 - 15.5 g/dL Final   03/15/2016 11.7 11.1 - 15.9 g/dL Final   04/28/2014 12.1 11.9 - 15.5 g/dL Final   01/29/2014 12.6 11.9 - 15.5 g/dL Final   01/28/2014 12.6 11.9 - 15.5 g/dL Final   01/26/2014 12.3 11.9 - 15.5 g/dL Final     Platelets   Date Value Ref Range Status   09/23/2020 179 140 - 450 10*3/mm3 Final   12/06/2019 173 140 - 450 10*3/mm3 Final   12/05/2019 203 140 - 450 10*3/mm3 Final   12/04/2019 190 140 - 450 10*3/mm3 Final   12/03/2019 196 140 - 450 10*3/mm3 Final   11/06/2019 197 140 - 450 10*3/mm3 Final   05/22/2019 184 140 - 450 10*3/mm3 Final   05/08/2019 174 140 - 450 10*3/mm3 Final   11/12/2018 221 150 - 375 10*3/mm3 Final   08/09/2018 187 140 - 500 10*3/mm3 Final   06/20/2018 216 150 - 375 10*3/mm3 Final   06/06/2018 189 150 - 375 10*3/mm3 Final   05/02/2018 250 140 - 500 10*3/mm3 Final   11/20/2017 226 140 - 500 10*3/mm3 Final   09/21/2017 227 140 - 500 10*3/mm3 Final   03/06/2017 268 140 - 500 10*3/mm3 Final   09/12/2016 221 140 - 500 10*3/mm3 Final   03/15/2016 220 150 - 379 x10E3/uL Final   04/28/2014 216 140 - 500 K/Cumm Final   01/29/2014 150 140 - 500 K/Cumm Final   01/28/2014 151 140 - 500 K/Cumm Final   01/26/2014 166 140 - 500 K/Cumm Final       Assessment/Plan   There are no diagnoses linked to this encounter.    *Anemia.  Hb 10.8 on 5/2/18.  Hb normal on 11/20/17 and on prior labs.  Unremarkable: SPEP, S CATRACHITO, haptoglobin, LDH, total bilirubin, cait.  Ferritin not robust at 40, but 20% iron sat and retic Hb high, not low.  Therefore, doubt this is iron deficiency.  · Hb previously 11-12 through 11/6/2019.  · Hb was around 9 during early December 2019 for day hospitalization for fall with cervical spine fracture.  · Hb 9.9 on NH labs, 8/9/2020.  · Hb 11.1 on [facility] labs, 9/23/2020.  · Hb 9 on NH labs, 11/3/2020.  · Because of this lower Hb value, check some additional anemia  labs and we will follow her more closely.  · Hb 10.9, ferritin 32, 23% saturation serum folate 10.8, B12 >1500, , haptoglobin 61, bilirubin 1.1, creatinine 1.1 on 2/2/2021  · Hb 11 on 7/22/2021, from 10.9 on 6/24/2021, from 9.9 on 5/27/2021, from 10.1 on 5/20/2021, from 10.4 on 5/6/2021, from 11 on 4/1/2021, from 10.4 on 3/4/2021, from 10.9 on 2/2/2021.    *Iron deficiency  · 11/24/2020: Ferritin 14, Hb 9.4  · Therefore, iron deficiency anemia.    · Ferrous sulfate twice daily started 12/8/2020.  · Subsequent Hb, 10.9, on 2/2/2021 (improved)  · 5/7/2021, ferritin 44  · 8/5/2021, ferritin 44    *Source of iron deficiency  Patient declines a GI evaluation.    *Leukocytopenia  · WBC previously normal through 12/6/2019  · On 8/9/2020, WBC 3.3  · On 9/23/2020, WBC 5.2  · On 11/3/2020, WBC 3.8  · On 2/2/2021, WBC 4.7  · On 3/4/2021, WBC 3.9  · On 4/1/2021, WBC 3.8.  · On 5/6/2021, WBC 4.3.  · 7/22/2021, WBC 5    *Thrombocytopenia  On 2/2/2021,   On 3/4/2021,   On 4/1/2021,   On 5/6/2021,   7/22/2021,       *b12 Borderline low, but normal at 269 with normal range 211-246 on 6/6/18.  · Oral B12 500 µg daily, started 6/20/18.  B12,  >1500    *Creatinine was 1.24 on 6/6/18.  On 5/2/18 and on prior labs, creatinine mostly 1.  Creatinine repeated 6/20/18:  1.26.  Creatinine 1.1    *Prior hyperkalemia.  Mild.  At that time, I asked the nurses to call her and make sure she is no longer taking her potassium supplement.  If she is, stop it.  Defer to her PCP if her Diovan stopped and any further workup or treatment of the renal insufficiency.  Potassium 4    *Macrocytosis.  Gradually worsening.  MCV improved from 101-106, down to 97 with oral B12.  MCV 99.2    *Pulmonary nodule, initially seen in 10/28/13.    CT April 2014 revealed some calcification, suggesting this was granulomatous disease.  Plan was to repeat one final CT around April 2015 and if stable know further follow-up CTs.   Patient did not return for follow-up.  At this point, if this were cancer, I would expect it to be apparent by now.  She is 88 years old.  · Patient and daughter do not want any further follow-up of this.    11/24/2020, unremarkable: Creatinine, bilirubin  Await direct Tania and haptoglobin    Plan  · Monthly CBC, faxed here.  · In 3 months, faxed here:  · CBC, ferritin, serum iron, TIBC, iron percent saturation  · MD, telephone visit, 1 week after the 3-month labs (Must have those lab results)  · Patient's mobile number is [phone]    Prior plan was:  · Continue oral 500 µg B12, daily.  · M.D. CBC B12 level 1 year (patient and daughter would like asfew visits is possible to try and minimize doctor's visits)    You have chosen to receive care through a telephone visit today. Do you consent to use a telephone visit for your medical care today? Yes    This visit has been rescheduled as a phone visit to comply with patient safety concerns in accordance with CDC recommendations. Total time of discussion was 11 minutes.    50350 is 5-10 minutes  65739 is 11-20 minutes  61807 is 21 or more minutes

## 2021-08-16 DIAGNOSIS — D64.9 ANEMIA, UNSPECIFIED TYPE: Primary | ICD-10-CM

## 2021-08-17 ENCOUNTER — DOCUMENTATION (OUTPATIENT)
Dept: ONCOLOGY | Facility: CLINIC | Age: 86
End: 2021-08-17

## 2021-09-07 ENCOUNTER — CLINICAL SUPPORT NO REQUIREMENTS (OUTPATIENT)
Dept: CARDIOLOGY | Facility: CLINIC | Age: 86
End: 2021-09-07

## 2021-09-07 ENCOUNTER — TELEPHONE (OUTPATIENT)
Dept: CARDIOLOGY | Facility: CLINIC | Age: 86
End: 2021-09-07

## 2021-09-07 DIAGNOSIS — I48.19 PERSISTENT ATRIAL FIBRILLATION (HCC): Primary | ICD-10-CM

## 2021-09-07 PROCEDURE — 93280 PM DEVICE PROGR EVAL DUAL: CPT | Performed by: INTERNAL MEDICINE

## 2021-09-27 ENCOUNTER — OFFICE VISIT (OUTPATIENT)
Dept: FAMILY MEDICINE CLINIC | Facility: CLINIC | Age: 86
End: 2021-09-27

## 2021-09-27 VITALS
DIASTOLIC BLOOD PRESSURE: 78 MMHG | OXYGEN SATURATION: 98 % | RESPIRATION RATE: 16 BRPM | TEMPERATURE: 97.6 F | HEIGHT: 62 IN | WEIGHT: 119 LBS | SYSTOLIC BLOOD PRESSURE: 122 MMHG | BODY MASS INDEX: 21.9 KG/M2 | HEART RATE: 77 BPM

## 2021-09-27 DIAGNOSIS — I10 ESSENTIAL HYPERTENSION: Primary | ICD-10-CM

## 2021-09-27 DIAGNOSIS — M1A.09X0 IDIOPATHIC CHRONIC GOUT OF MULTIPLE SITES WITHOUT TOPHUS: ICD-10-CM

## 2021-09-27 DIAGNOSIS — E11.3393 TYPE 2 DIABETES MELLITUS WITH BOTH EYES AFFECTED BY MODERATE NONPROLIFERATIVE RETINOPATHY WITHOUT MACULAR EDEMA, WITHOUT LONG-TERM CURRENT USE OF INSULIN (HCC): ICD-10-CM

## 2021-09-27 DIAGNOSIS — E78.49 OTHER HYPERLIPIDEMIA: ICD-10-CM

## 2021-09-27 DIAGNOSIS — F32.5 MAJOR DEPRESSIVE DISORDER WITH SINGLE EPISODE, IN FULL REMISSION (HCC): ICD-10-CM

## 2021-09-27 PROCEDURE — 99214 OFFICE O/P EST MOD 30 MIN: CPT | Performed by: FAMILY MEDICINE

## 2021-09-27 RX ORDER — PIOGLITAZONEHYDROCHLORIDE 30 MG/1
30 TABLET ORAL DAILY
Qty: 90 TABLET | Refills: 1 | Status: SHIPPED | OUTPATIENT
Start: 2021-09-27 | End: 2022-03-26

## 2021-09-27 RX ORDER — ESCITALOPRAM OXALATE 10 MG/1
10 TABLET ORAL DAILY
Qty: 90 TABLET | Refills: 1 | Status: SHIPPED | OUTPATIENT
Start: 2021-09-27 | End: 2022-03-26

## 2021-09-27 RX ORDER — METOPROLOL TARTRATE 50 MG/1
50 TABLET, FILM COATED ORAL 2 TIMES DAILY
Qty: 180 TABLET | Refills: 1 | Status: SHIPPED | OUTPATIENT
Start: 2021-09-27 | End: 2022-03-26

## 2021-09-27 RX ORDER — ATORVASTATIN CALCIUM 20 MG/1
20 TABLET, FILM COATED ORAL DAILY
Qty: 90 TABLET | Refills: 1 | Status: SHIPPED | OUTPATIENT
Start: 2021-09-27 | End: 2022-03-26

## 2021-09-27 RX ORDER — ALLOPURINOL 100 MG/1
100 TABLET ORAL DAILY
Qty: 90 TABLET | Refills: 1 | Status: SHIPPED | OUTPATIENT
Start: 2021-09-27 | End: 2022-03-26

## 2021-09-27 NOTE — PROGRESS NOTES
"Chief Complaint  Hypertension    Kay Neal presents to [facility] to refill medicines. Labs are due. Type 2 diabetes stable pending lab results. Blood pressure is controlled on current therapy. No recent gout attacks. Depression stable on current therapy.          Objective   Vital Signs:   Vitals:    09/27/21 1047   BP: 122/78   Pulse: 77   Resp: 16   Temp: 97.6 °F (36.4 °C)   TempSrc: Skin   SpO2: 98%   Weight: 54 kg (119 lb)   Height: 157.5 cm (62\")        Physical Exam  Vitals reviewed.   Constitutional:       General: She is not in acute distress.  Eyes:      General: Lids are normal.      Conjunctiva/sclera: Conjunctivae normal.   Neck:      Vascular: No carotid bruit.      Trachea: No tracheal deviation.   Cardiovascular:      Rate and Rhythm: Normal rate and regular rhythm.      Heart sounds: Normal heart sounds. No murmur heard.     Pulmonary:      Effort: Pulmonary effort is normal.      Breath sounds: Normal breath sounds.   Musculoskeletal:      Comments: Roller seated walker     Skin:     General: Skin is warm and dry.   Neurological:      Mental Status: She is alert. She is not disoriented.   Psychiatric:         Speech: Speech normal.         Behavior: Behavior normal. Behavior is cooperative.          Result Review :                Assessment and Plan    Diagnoses and all orders for this visit:    1. Essential hypertension (Primary)  Assessment & Plan:  The current medical regimen is effective;  continue present plan and medications.      Orders:  -     metoprolol tartrate (LOPRESSOR) 50 MG tablet; Take 1 tablet by mouth 2 (Two) Times a Day for 180 days.  Dispense: 180 tablet; Refill: 1  -     Comprehensive Metabolic Panel; Future    2. Idiopathic chronic gout of multiple sites without tophus  Assessment & Plan:  The current medical regimen is effective;  continue present plan and medications.      Orders:  -     allopurinol (ZYLOPRIM) 100 MG tablet; " Take 1 tablet by mouth Daily for 180 days.  Dispense: 90 tablet; Refill: 1  -     Uric acid; Future    3. Other hyperlipidemia  Assessment & Plan:  The current medical regimen is effective;  continue present plan and medications.      Orders:  -     atorvastatin (LIPITOR) 20 MG tablet; Take 1 tablet by mouth Daily for 180 days.  Dispense: 90 tablet; Refill: 1  -     Lipid Panel With / Chol / HDL Ratio; Future  -     CK; Future    4. Major depressive disorder with single episode, in full remission (HCC)  Assessment & Plan:  The current medical regimen is effective;  continue present plan and medications.      Orders:  -     escitalopram (LEXAPRO) 10 MG tablet; Take 1 tablet by mouth Daily for 180 days.  Dispense: 90 tablet; Refill: 1    5. Type 2 diabetes mellitus with both eyes affected by moderate nonproliferative retinopathy without macular edema, without long-term current use of insulin ([facility])  Assessment & Plan:  The current medical regimen is effective;  continue present plan and medications.      Orders:  -     metFORMIN (GLUCOPHAGE) 500 MG tablet; Take 1 tablet by mouth 2 (Two) Times a Day With Meals for 180 days.  Dispense: 180 tablet; Refill: 1  -     pioglitazone (ACTOS) 30 MG tablet; Take 1 tablet by mouth Daily for 180 days.  Dispense: 90 tablet; Refill: 1  -     Hemoglobin A1c; Future  -     MicroAlbumin, Urine, Random - Urine, Clean Catch; Future      Follow Up   Return in about 6 months (around 3/27/2022) for Medicare Wellness & regular visit, [id number] min.  Patient was given instructions and counseling regarding her condition or for health maintenance advice. Please see specific information pulled into the AVS if appropriate.

## 2021-09-28 RX ORDER — PIOGLITAZONEHYDROCHLORIDE 30 MG/1
TABLET ORAL
Qty: 90 TABLET | Refills: 1 | OUTPATIENT
Start: 2021-09-28

## 2021-10-24 ENCOUNTER — TELEPHONE (OUTPATIENT)
Dept: FAMILY MEDICINE CLINIC | Facility: CLINIC | Age: 86
End: 2021-10-24

## 2021-10-24 NOTE — TELEPHONE ENCOUNTER
Discussed with Haritha, nurse at [facility] for Val Jeronimo. Val had unwitnessed falls. No apparent injury, doing fine. She stated they will watch her carefully with neurochecks. Call was just a routine notification.

## 2021-11-05 ENCOUNTER — TELEMEDICINE (OUTPATIENT)
Dept: ONCOLOGY | Facility: CLINIC | Age: 86
End: 2021-11-05

## 2021-11-05 DIAGNOSIS — D64.9 ANEMIA, UNSPECIFIED TYPE: Primary | ICD-10-CM

## 2021-11-05 PROCEDURE — 99442 PR PHYS/QHP TELEPHONE EVALUATION 11-20 MIN: CPT | Performed by: INTERNAL MEDICINE

## 2021-11-05 NOTE — PROGRESS NOTES
.     REASON FOR FOLLOWUP :   Macrocytosis, anemia    HISTORY OF PRESENT ILLNESS:  The patient is a 89 y.o. year old female  who is here for follow-up with the above-mentioned history.    No new problems since last visit.  No bleeding, chest pain, SOA    Past Medical History:   Diagnosis Date   • Arthritis    • Atrial fibrillation (CMS/HCC)    • CHF (congestive heart failure) (CMS/HCC)    • Degeneration of thoracic intervertebral disc    • Diabetes mellitus (CMS/HCC)    • Gout    • H/O Pulmonary nodule 10/2013   • History of anemia 10/2013   • History of cellulitis 2016    Finger of left hand   • Hyperlipidemia    • Hypertension    • Odontoid fracture (CMS/HCC)    • Osteoporosis    • Pacemaker      Past Surgical History:   Procedure Laterality Date   • NECK SURGERY  10/2013    Broken neck repair   • PACEMAKER IMPLANTATION     • SKIN CANCER EXCISION      12/20/19       HEMATOLOGIC/ONCOLOGIC HISTORY:  (History from previous dates can be found in the separate document.)    MEDICATIONS    Current Outpatient Medications:   •  acetaminophen (TYLENOL) 325 MG tablet, Take 2 tablets by mouth Every 4 (Four) Hours As Needed for Mild Pain ., Disp: , Rfl:   •  allopurinol (ZYLOPRIM) 100 MG tablet, Take 1 tablet by mouth Daily for 180 days., Disp: 90 tablet, Rfl: 1  •  aspirin 81 MG tablet, Take by mouth daily., Disp: , Rfl:   •  atorvastatin (LIPITOR) 20 MG tablet, Take 1 tablet by mouth Daily for 180 days., Disp: 90 tablet, Rfl: 1  •  digoxin (LANOXIN) 125 MCG tablet, TAKE ONE TABLET BY MOUTH DAILY, Disp: 90 tablet, Rfl: 3  •  escitalopram (LEXAPRO) 10 MG tablet, Take 1 tablet by mouth Daily for 180 days., Disp: 90 tablet, Rfl: 1  •  ferrous sulfate 325 (65 FE) MG tablet, Take 1 tablet by mouth 2 (two) times a day., Disp: 60 tablet, Rfl: 3  •  furosemide (LASIX) 40 MG tablet, TAKE ONE AND ONE-HALF (1 & 1/2) TABLET BY MOUTH DAILY, Disp: 135 tablet, Rfl: 3  •  metFORMIN (GLUCOPHAGE) 500 MG tablet, Take 1 tablet by mouth 2  (Two) Times a Day With Meals for 180 days., Disp: 180 tablet, Rfl: 1  •  metoprolol tartrate (LOPRESSOR) 50 MG tablet, Take 1 tablet by mouth 2 (Two) Times a Day for 180 days., Disp: 180 tablet, Rfl: 1  •  pioglitazone (ACTOS) 30 MG tablet, Take 1 tablet by mouth Daily for 180 days., Disp: 90 tablet, Rfl: 1  •  Probiotic capsule, Take  by mouth Daily., Disp: , Rfl:   •  vitamin B-12 (CYANOCOBALAMIN) 1000 MCG tablet, Take 1,000 mcg by mouth Daily., Disp: , Rfl:     ALLERGIES:     Allergies   Allergen Reactions   • Advil [Ibuprofen] Unknown - Low Severity     .       SOCIAL HISTORY:       Social History     Socioeconomic History   • Marital status:      Spouse name: Noah   • Number of children: 5   • Years of education: High School   Tobacco Use   • Smoking status: Never Smoker   • Smokeless tobacco: Never Used   • Tobacco comment:     Substance and Sexual Activity   • Alcohol use: Yes     Alcohol/week: 1.0 standard drink     Types: 1 Glasses of wine per week     Comment: occ/caffeine use   • Drug use: No   • Sexual activity: Defer         FAMILY HISTORY:  Family History   Problem Relation Age of Onset   • Heart disease Father    • Kidney cancer Sister 61        Renal cell carcinoma   • Leukemia Son 44        CML   • Colon cancer Daughter 64   • COPD Sister         non smoker       REVIEW OF SYSTEMS:  Review of Systems   Constitutional: Negative for activity change.   HENT: Negative for nosebleeds and trouble swallowing.    Respiratory: Negative for shortness of breath and wheezing.    Cardiovascular: Negative for chest pain and palpitations.   Gastrointestinal: Negative for constipation, diarrhea and nausea.   Genitourinary: Negative for dysuria and hematuria.   Musculoskeletal: Negative for arthralgias and myalgias.   Skin: Negative for rash and wound.   Neurological: Negative for seizures and syncope.   Hematological: Negative for adenopathy. Does not bruise/bleed easily.   Psychiatric/Behavioral: Negative  for confusion.              There were no vitals filed for this visit.  Current Status 11/6/2019   ECOG score 1      PHYSICAL EXAM:    No exam as this was a telephone visit    RECENT LABS:        WBC   Date Value Ref Range Status   09/23/2020 5.24 3.40 - 10.80 10*3/mm3 Final   12/06/2019 7.50 3.40 - 10.80 10*3/mm3 Final   12/05/2019 5.99 3.40 - 10.80 10*3/mm3 Final   12/04/2019 5.62 3.40 - 10.80 10*3/mm3 Final   12/03/2019 7.22 3.40 - 10.80 10*3/mm3 Final   11/06/2019 5.74 3.40 - 10.80 10*3/mm3 Final   05/22/2019 6.60 3.40 - 10.80 10*3/mm3 Final   05/08/2019 6.18 3.40 - 10.80 10*3/mm3 Final   11/12/2018 6.97 4.00 - 10.00 10*3/mm3 Final   08/09/2018 5.24 4.50 - 10.70 10*3/mm3 Final   06/20/2018 7.78 4.00 - 10.00 10*3/mm3 Final   06/06/2018 6.76 4.00 - 10.00 10*3/mm3 Final   05/02/2018 5.59 4.50 - 10.70 10*3/mm3 Final   11/20/2017 6.56 4.50 - 10.70 10*3/mm3 Final   09/21/2017 6.73 4.50 - 10.70 10*3/mm3 Final   03/06/2017 7.08 4.50 - 10.70 10*3/mm3 Final   09/12/2016 5.86 4.50 - 10.70 10*3/mm3 Final   03/15/2016 6.8 3.4 - 10.8 x10E3/uL Final   04/28/2014 9.70 4.50 - 10.70 K/Cumm Final   01/29/2014 4.04 (L) 4.50 - 10.70 K/Cumm Final   01/28/2014 4.27 (L) 4.50 - 10.70 K/Cumm Final   01/26/2014 6.84 4.50 - 10.70 K/Cumm Final     Hemoglobin   Date Value Ref Range Status   09/23/2020 11.1 (L) 12.0 - 15.9 g/dL Final   12/06/2019 8.8 (L) 12.0 - 15.9 g/dL Final   12/05/2019 9.1 (L) 12.0 - 15.9 g/dL Final   12/04/2019 8.8 (L) 12.0 - 15.9 g/dL Final   12/03/2019 10.2 (L) 12.0 - 15.9 g/dL Final   11/06/2019 11.6 (L) 12.0 - 15.9 g/dL Final   05/22/2019 11.5 (L) 12.0 - 15.9 g/dL Final   05/08/2019 11.4 (L) 12.0 - 15.9 g/dL Final   11/12/2018 12.8 11.5 - 14.9 g/dL Final   08/09/2018 10.9 (L) 11.9 - 15.5 g/dL Final   06/20/2018 11.2 (L) 11.5 - 14.9 g/dL Final   06/06/2018 10.9 (L) 11.5 - 14.9 g/dL Final   05/02/2018 10.8 (L) 11.9 - 15.5 g/dL Final   11/20/2017 12.3 11.9 - 15.5 g/dL Final   09/21/2017 12.4 11.9 - 15.5 g/dL Final    03/06/2017 12.7 11.9 - 15.5 g/dL Final   09/12/2016 12.5 11.9 - 15.5 g/dL Final   03/15/2016 11.7 11.1 - 15.9 g/dL Final   04/28/2014 12.1 11.9 - 15.5 g/dL Final   01/29/2014 12.6 11.9 - 15.5 g/dL Final   01/28/2014 12.6 11.9 - 15.5 g/dL Final   01/26/2014 12.3 11.9 - 15.5 g/dL Final     Platelets   Date Value Ref Range Status   09/23/2020 179 140 - 450 10*3/mm3 Final   12/06/2019 173 140 - 450 10*3/mm3 Final   12/05/2019 203 140 - 450 10*3/mm3 Final   12/04/2019 190 140 - 450 10*3/mm3 Final   12/03/2019 196 140 - 450 10*3/mm3 Final   11/06/2019 197 140 - 450 10*3/mm3 Final   05/22/2019 184 140 - 450 10*3/mm3 Final   05/08/2019 174 140 - 450 10*3/mm3 Final   11/12/2018 221 150 - 375 10*3/mm3 Final   08/09/2018 187 140 - 500 10*3/mm3 Final   06/20/2018 216 150 - 375 10*3/mm3 Final   06/06/2018 189 150 - 375 10*3/mm3 Final   05/02/2018 250 140 - 500 10*3/mm3 Final   11/20/2017 226 140 - 500 10*3/mm3 Final   09/21/2017 227 140 - 500 10*3/mm3 Final   03/06/2017 268 140 - 500 10*3/mm3 Final   09/12/2016 221 140 - 500 10*3/mm3 Final   03/15/2016 220 150 - 379 x10E3/uL Final   04/28/2014 216 140 - 500 K/Cumm Final   01/29/2014 150 140 - 500 K/Cumm Final   01/28/2014 151 140 - 500 K/Cumm Final   01/26/2014 166 140 - 500 K/Cumm Final       Assessment/Plan   There are no diagnoses linked to this encounter.    *Anemia.  Hb 10.8 on 5/2/18.  Hb normal on 11/20/17 and on prior labs.  Unremarkable: SPEP, S CATRACHITO, haptoglobin, LDH, total bilirubin, cait.  Ferritin not robust at 40, but 20% iron sat and retic Hb high, not low.  Therefore, doubt this is iron deficiency.  · Hb previously 11-12 through 11/6/2019.  · Hb was around 9 during early December 2019 for day hospitalization for fall with cervical spine fracture.  · Hb 9.9 on [facility] labs, 8/9/2020.  · Hb 11.1 on [facility] labs, 9/23/2020.  · Hb 9 on NH labs, 11/3/2020.  · Because of this lower Hb value, check some additional anemia labs and we will follow her more closely.  · Hb  10.9, ferritin 32, 23% saturation serum folate 10.8, B12 >1500, , haptoglobin 61, bilirubin 1.1, creatinine 1.1 on 2/2/2021  · Hb 11.2 on 10/28/2021, from 11 on 7/22/2021, from 10.9 on 6/24/2021, from 9.9 on 5/27/2021, from 10.1 on 5/20/2021, from 10.4 on 5/6/2021, from 11 on 4/1/2021, from 10.4 on 3/4/2021, from 10.9 on 2/2/2021.    *Iron deficiency  · 11/24/2020: Ferritin 14, Hb 9.4  · Therefore, iron deficiency anemia.    · Ferrous sulfate twice daily started 12/8/2020.  · Subsequent Hb, 10.9, on 2/2/2021 (improved)  · 5/7/2021, ferritin 44  · 8/5/2021, ferritin 44  · 10/28/2021: Ferritin 60, 26% saturation    *Source of iron deficiency  Patient declines a GI evaluation.    *Leukocytopenia  · WBC previously normal through 12/6/2019  · On 8/9/2020, WBC 3.3  · On 9/23/2020, WBC 5.2  · On 11/3/2020, WBC 3.8  · On 2/2/2021, WBC 4.7  · On 3/4/2021, WBC 3.9  · On 4/1/2021, WBC 3.8.  · On 5/6/2021, WBC 4.3.  · 7/22/2021, WBC 5   · 10/28/2022, 3.6    *Thrombocytopenia  On 2/2/2021,   On 3/4/2021,   On 4/1/2021,   On 5/6/2021,   7/22/2021,   10/28/2022, 172      *b12 Borderline low, but normal at 269 with normal range 211-246 on 6/6/18.  · Oral B12 500 µg daily, started 6/20/18.  B12,  >1500    *Creatinine was 1.24 on 6/6/18.  On 5/2/18 and on prior labs, creatinine mostly 1.  Creatinine repeated 6/20/18:  1.26.  Creatinine 1.1    *Prior hyperkalemia.  Mild.  At that time, I asked the nurses to call her and make sure she is no longer taking her potassium supplement.  If she is, stop it.  Defer to her PCP if her Diovan stopped and any further workup or treatment of the renal insufficiency.  Potassium 4    *Macrocytosis.  Gradually worsening.  MCV improved from 101-106, down to 97 with oral B12.  .7    *Pulmonary nodule, initially seen in 10/28/13.    CT April 2014 revealed some calcification, suggesting this was granulomatous disease.  Plan was to repeat one final CT around  April 2015 and if stable know further follow-up CTs.  Patient did not return for follow-up.  At this point, if this were cancer, I would expect it to be apparent by now.  She is 88 years old.  · Patient and daughter do not want any further follow-up of this.    11/24/2020, unremarkable: Creatinine, bilirubin  Await direct Tania and haptoglobin    Plan  · Monthly CBC, faxed here.  · In 3 months, faxed here:  · CBC, ferritin, serum iron, TIBC, iron percent saturation  · MD, telephone visit, 1 week after the 3-month labs (Must have those lab results)  · Patient's mobile number is [phone]    Prior plan was:  · Continue oral 500 µg B12, daily.  · M.D. CBC B12 level 1 year (patient and daughter would like asfew visits is possible to try and minimize doctor's visits)    Patient consented to telephone visit.  Patient's identity was verified.  This was a telehealth visit due to the coronavirus pandemic.  The patient was at home and I was in the office.  This visit was scheduled as a phone visit to comply with patient safety concerns in accordance with CDC recommendations. Total time of discussion was 11 minutes.    14887 is 5-10 minutes  65107 is 11-20 minutes  33343 is 21 or more minutes

## 2021-11-18 ENCOUNTER — OFFICE VISIT (OUTPATIENT)
Dept: CARDIOLOGY | Facility: CLINIC | Age: 86
End: 2021-11-18

## 2021-11-18 VITALS
HEART RATE: 73 BPM | HEIGHT: 62 IN | BODY MASS INDEX: 21.77 KG/M2 | OXYGEN SATURATION: 95 % | DIASTOLIC BLOOD PRESSURE: 70 MMHG | SYSTOLIC BLOOD PRESSURE: 115 MMHG

## 2021-11-18 DIAGNOSIS — I48.19 PERSISTENT ATRIAL FIBRILLATION (HCC): ICD-10-CM

## 2021-11-18 DIAGNOSIS — Z95.0 HISTORY OF PACEMAKER: ICD-10-CM

## 2021-11-18 DIAGNOSIS — I10 ESSENTIAL HYPERTENSION: Primary | ICD-10-CM

## 2021-11-18 PROCEDURE — 99214 OFFICE O/P EST MOD 30 MIN: CPT | Performed by: INTERNAL MEDICINE

## 2021-11-18 NOTE — PROGRESS NOTES
"      CARDIOLOGY    Ananth Yoo MD    ENCOUNTER DATE:  11/18/2021    Val Jeronimo / 89 y.o. / female        CHIEF COMPLAINT / REASON FOR OFFICE VISIT     Persistent atrial fibrillation (11/19/2020 Follow up)  Hypertension  History of pacemaker    HISTORY OF PRESENT ILLNESS       HPI  Val Jeronimo is a 89 y.o. female who presents today for reevaluation.  Patient is doing okay.  She did fall on her shoulder several weeks ago.  She denies chest pains palpitations syncope near syncope.  Says she just got tripped over her own laundry.      The following portions of the patient's history were reviewed and updated as appropriate: allergies, current medications, past family history, past medical history, past social history, past surgical history and problem list.      VITAL SIGNS     Visit Vitals  /70 (BP Location: Left arm)   Pulse 73   Ht 157.5 cm (62\")   SpO2 95%   BMI 21.77 kg/m²         Wt Readings from Last 3 Encounters:   09/27/21 54 kg (119 lb)   03/24/21 58.1 kg (128 lb)   11/19/20 59.9 kg (132 lb)     Body mass index is 21.77 kg/m².      REVIEW OF SYSTEMS   ROS        PHYSICAL EXAMINATION     Vitals reviewed.   Constitutional:       Appearance: Not in distress.   Pulmonary:      Breath sounds: Normal breath sounds.   Cardiovascular:      Normal rate. Regular rhythm. Normal S1. Normal S2.      Murmurs: There is no murmur.      No gallop. No click. No rub.   Pulses:     Intact distal pulses.   Edema:     Peripheral edema absent.   Neurological:      Mental Status: Alert.           REVIEWED DATA     Procedures    Cardiac Procedures:  1.           ASSESSMENT & PLAN      Diagnosis Plan   1. Essential hypertension     2. Persistent atrial fibrillation     3. History of pacemaker           SUMMARY/DISCUSSION  1. Hypertension blood pressures great  2. Chronic atrial fibrillation stable patient is not a good anticoagulation candidate due to frequent falls.  Therefore she just remains on " aspirin.  3. Permanent pacemaker.  I did discuss with her and her daughter that the next time she gets her pacemaker checked in a year just to see me the same day so she does not have to come out twice.  She is an assistant living facility.  She actually is liking quite a bit.        MEDICATIONS         Discharge Medications          Accurate as of November 18, 2021  3:47 PM. If you have any questions, ask your nurse or doctor.            Continue These Medications      Instructions Start Date   acetaminophen 325 MG tablet  Commonly known as: TYLENOL   650 mg, Oral, Every 4 Hours PRN      allopurinol 100 MG tablet  Commonly known as: ZYLOPRIM   100 mg, Oral, Daily      aspirin 81 MG tablet   Oral, Daily      atorvastatin 20 MG tablet  Commonly known as: LIPITOR   20 mg, Oral, Daily      digoxin 125 MCG tablet  Commonly known as: LANOXIN   TAKE ONE TABLET BY MOUTH DAILY      escitalopram 10 MG tablet  Commonly known as: LEXAPRO   10 mg, Oral, Daily      ferrous sulfate 325 (65 FE) MG tablet   325 mg, Oral, 2 times daily      furosemide 40 MG tablet  Commonly known as: LASIX   TAKE ONE AND ONE-HALF (1 & 1/2) TABLET BY MOUTH DAILY      metFORMIN 500 MG tablet  Commonly known as: GLUCOPHAGE   500 mg, Oral, 2 Times Daily With Meals      metoprolol tartrate 50 MG tablet  Commonly known as: LOPRESSOR   50 mg, Oral, 2 Times Daily      pioglitazone 30 MG tablet  Commonly known as: ACTOS   30 mg, Oral, Daily      Probiotic capsule   Oral, Daily      vitamin B-12 1000 MCG tablet  Commonly known as: CYANOCOBALAMIN   1,000 mcg, Oral, Daily                 **Dragon Disclaimer:   Much of this encounter note is an electronic transcription/translation of spoken language to printed text. The electronic translation of spoken language may permit erroneous, or at times, nonsensical words or phrases to be inadvertently transcribed. Although I have reviewed the note for such errors, some may still exist.

## 2021-12-20 RX ORDER — FUROSEMIDE 40 MG/1
TABLET ORAL
Qty: 135 TABLET | Refills: 3 | Status: SHIPPED | OUTPATIENT
Start: 2021-12-20